# Patient Record
Sex: FEMALE | Race: WHITE | NOT HISPANIC OR LATINO | Employment: UNEMPLOYED | URBAN - METROPOLITAN AREA
[De-identification: names, ages, dates, MRNs, and addresses within clinical notes are randomized per-mention and may not be internally consistent; named-entity substitution may affect disease eponyms.]

---

## 2017-01-16 ENCOUNTER — OFFICE VISIT (OUTPATIENT)
Dept: PSYCHIATRY | Facility: CLINIC | Age: 57
End: 2017-01-16
Payer: COMMERCIAL

## 2017-01-16 DIAGNOSIS — Z63.9 FAMILY PROBLEMS: ICD-10-CM

## 2017-01-16 DIAGNOSIS — F31.32 BIPOLAR AFFECTIVE DISORDER, CURRENTLY DEPRESSED, MODERATE: Primary | ICD-10-CM

## 2017-01-16 DIAGNOSIS — Z65.8 INTERPERSONAL PROBLEM: ICD-10-CM

## 2017-01-16 DIAGNOSIS — F41.1 GAD (GENERALIZED ANXIETY DISORDER): ICD-10-CM

## 2017-01-16 DIAGNOSIS — F32.A DEPRESSION, UNSPECIFIED DEPRESSION TYPE: ICD-10-CM

## 2017-01-16 PROCEDURE — 90834 PSYTX W PT 45 MINUTES: CPT | Mod: S$GLB,,, | Performed by: SOCIAL WORKER

## 2017-01-16 SDOH — SOCIAL DETERMINANTS OF HEALTH (SDOH): PROBLEM RELATED TO PRIMARY SUPPORT GROUP, UNSPECIFIED: Z63.9

## 2017-01-16 NOTE — PROGRESS NOTES
ENCOUNTER DATE 1/15/17    SITE: [facility]    INSIGHT ORIENTED PSYCHOTHERAPY: 39166 45-50 min    Met with patient .    ENCOUNTER REASON/CHIEF COMPLAINT(symptom, problem, diagnosis, follow-up, reason for encounter):   The patient presents for follow up for coping skills to better manage her anxiety, depression and stress. pt. struggles with self regulation of her emotional states.     INTERVAL EVENTS: Pt. was last seen 1 month ago for session. Pt presents with her  for session. Pt appears less-anxious and upset. Pt continues to report she has not lost any more weight and attributes this to her medication adjustment is now only taking her risperdal, lamictal, & valium.  Pt denies any SI however continues to ruminate over events of the past in a negative manner and continues to express excessive guilt and regrets about not being more helpful with her mother's care or not being a good mother to her children. Pt reports to feel envious of others who has good relationships with their children and also feels great disappointment and embarrassed that her kids did not pursue lives and make the choices she thought they would have made. Pt reports that overall her kelsi was enjoyable both with her family as well as the dinner she hosted on Lamoni day.   Pt has not resumed working on her [employer]. Pt reports she did attend her [employer] party for her [employer] group. Pt also is planning to have her children over for lunch on Kelsi day.  Despite having this success and even helping out and taking her mother for an extra long weekend pt continues to overly dwell on the negatives and is not able to focus on the positives in her life. Pt's  expressed great frustration with his wife's outlook and her behavior surrounding this issue. Pt continues to struggle with negative thoughts and guilt from the past.  Clinician redirected pt on focusing on the present and how she can be of help to her siblings with her  mother explored alternatives ways to help. Clinician continues to strongly encourage pt to resume one of her hobbies such as pottery.   Pt continues to describe having great worry over her husbands health particularly with his eyesight. Clinician continues to work with pt on utilizing cbt skills to better manage and tolerate distress and improve interpersonal relationship skills. Pt's effort has been minimal. Pt worked on setting new goals for next meeting. Pt reports to continue medication regimen as prescribed.  Clinician continues to work with pt on self acceptance, being more realistic with her self and others and working on identifying her excessive guilt especially as it relates to her family e.g. Relationship with children.  Clinician challenged pt to work on having more structure in her daily scheduled and to work on being mindful of how much negative thinking she is dwelling on rather engaging in positive activities & focus on the present. Pt continues to report she has been compliant with medications since her last session. Pt presents more casually dressed and groomed than usual-Mood:steady better regulated-less depressed/anxious,  Affect:appropriate to circumstance, TP: circumstantial,tangential , No rapid & pressured speech,  racing thoughts, paranoid thinking, agitation, mood swings, tearful episodes (+) worry,(+) obsessive & intrusive thoughts,Sleep: improved, No SI,insight/judgement: fair/improving.  Pt continues to present with automatic negative thoughts about her self continues to have insecurities, ego dystonic and extreme guilt over her lack of involvement with her mother.  Clinician continues to challenge these negative thoughts, focus on challenge statements, worked on mindfulness become of aware of her distortions that contribute to her feelings anxious and depressed. Pt continues to discuss stress mgmt coping skills, family dynamics. Pt continues to struggle with interpersonal relationships  and family dynamics.      TREATMENT PLAN:     No changes in treatment plan from chart note of (date): 1/26/2012    Target Symptoms: Mood swings,depression, anxiety, irritability,anergiam anhedonia, affective lability, poor interpersonal relationships, co dependent bx's, guilt and racing thoughts, no motivation, frequent tearful episodes, cognitive distortions, paranoid & hopeless.    Therapeutic Intervention type:   Support  Behavior Modification  Medication Mgmt  Why chosen therapy is appropriate versus another modality:  Relevant to diagnosis:evidenced based.  Patient responds to this modality  Outcome monitoring methods:  Self-Report  Observation    RISK PARAMETERS:     Patient reports no suicidal ideation.  Patient reports no homicidal ideation.  Patient reports no self injurious behavior.  Patient reports no violent behavior.    PATIENT'S RESPONSE TO INTERVENTION:   The patient's response to intervention is accepting.    PROGRESS TOWARD GOALS AND OTHER MENTAL STATUS CHANGES:   The patient's progress toward goals is poor.    DIAGNOSIS  Bipolar DO, most recent manic  (296.52)  HUMPHREY (300.02)  Personality NOS (301.89)  Family Circumstance NOS/Caring for an aging parent with dementia (V61.3)    GLOBAL ASSESSMENT OF FUNCTION SCALE:  The patient's GAF is 70    PLAN:   Pt. should start to attend individual therapy once a week.   Medication Management: Pt is currently being followed by Dr. Mack.   See physician notes.  Return to clinic in 2-4 weeks.

## 2017-02-03 ENCOUNTER — TELEPHONE (OUTPATIENT)
Dept: FAMILY MEDICINE | Facility: CLINIC | Age: 57
End: 2017-02-03

## 2017-02-03 DIAGNOSIS — Z00.00 ROUTINE GENERAL MEDICAL EXAMINATION AT A HEALTH CARE FACILITY: Primary | ICD-10-CM

## 2017-02-03 NOTE — TELEPHONE ENCOUNTER
----- Message from Kristen Fuentes sent at 2/3/2017 11:40 AM CST -----  Contact: Self  Doctor appointment and lab have been scheduled.  Please link lab orders to the lab appointment.  Date of doctor appointment:  6/21  Physical or EP:  Physcial  Date of lab appointment:  6/15  Comments:

## 2017-02-14 ENCOUNTER — OFFICE VISIT (OUTPATIENT)
Dept: PSYCHIATRY | Facility: CLINIC | Age: 57
End: 2017-02-14
Payer: COMMERCIAL

## 2017-02-14 DIAGNOSIS — F32.A DEPRESSION, UNSPECIFIED DEPRESSION TYPE: ICD-10-CM

## 2017-02-14 DIAGNOSIS — F31.32 BIPOLAR AFFECTIVE DISORDER, CURRENTLY DEPRESSED, MODERATE: Primary | ICD-10-CM

## 2017-02-14 DIAGNOSIS — F41.1 GAD (GENERALIZED ANXIETY DISORDER): ICD-10-CM

## 2017-02-14 PROCEDURE — 90834 PSYTX W PT 45 MINUTES: CPT | Mod: S$GLB,,, | Performed by: SOCIAL WORKER

## 2017-02-14 NOTE — PROGRESS NOTES
ENCOUNTER DATE 2/14/17    SITE: [facility]    INSIGHT ORIENTED PSYCHOTHERAPY: 16401 45-50 min    Met with patient .    ENCOUNTER REASON/CHIEF COMPLAINT(symptom, problem, diagnosis, follow-up, reason for encounter):   The patient presents for follow up for coping skills to better manage her anxiety, depression and stress. pt. struggles with self regulation of her emotional states.     INTERVAL EVENTS: Pt. was last seen 1 month ago for session. Pt presents alone for session. Pt appears less-anxious and upset. Pt continues to report she has not lost any more weight and attributes this to her medication adjustment is now only taking her seroquel, risperdal, lamictal, & valium.  Pt denies any SI however continues to worry over future events regarding the status of their health particularly her 's eye sight as there has been further detertoriation of his eye sight-eventual blindness has been the prognosis and pt worries she will develop alzheimer's like her mother. Pt worries as to how they will be cared for in the future. Clinician worked with pt to reframe concerns in practical ways that can be better addressed to alleviate her concerns.  Pt is very dependent on her  to manage most of their affairs etc. It has only been since pt's  can no longer drive that pt has had to take more of a leadership role at least with driving them to where they need to go. Pt's  still manages their financial affairs and pays bill mostly all on-line.  Clinician continues to work with pt on utilizing cbt skills to better manage and tolerate distress and improve interpersonal relationship skills. Pt's effort has been minimal. Pt worked on setting new goals for next meeting. Pt reports to continue medication regimen as prescribed.  Clinician continues to work with pt on self acceptance, being more realistic with her self and others and working on identifying her excessive guilt especially as it relates to her  family e.g. Relationship with children.  Clinician challenged pt to work on having more structure in her daily scheduled and to work on being mindful of how much negative thinking she is dwelling on rather engaging in positive activities & focus on the present. Pt continues to report she has been compliant with medications since her last session. Pt presents more casually dressed and groomed than usual-Mood:steady better regulated-less depressed/anxious,  Affect:appropriate to circumstance, TP: circumstantial,tangential , No rapid & pressured speech,  racing thoughts, paranoid thinking, agitation, mood swings, tearful episodes (+) worry,(+) obsessive & intrusive thoughts,Sleep: improved, No SI,insight/judgement: fair/improving.  Pt continues to present with automatic negative thoughts about her self continues to have insecurities, ego dystonic and extreme guilt over her lack of involvement with her mother.  Clinician continues to challenge these negative thoughts, focus on challenge statements, worked on mindfulness become of aware of her distortions that contribute to her feelings anxious and depressed. Pt continues to discuss stress mgmt coping skills, family dynamics. Pt continues to struggle with interpersonal relationships and family dynamics.      TREATMENT PLAN:     No changes in treatment plan from chart note of (date): 1/26/2012    Target Symptoms: Mood swings,depression, anxiety, irritability,anergiam anhedonia, affective lability, poor interpersonal relationships, co dependent bx's, guilt and racing thoughts, no motivation, frequent tearful episodes, cognitive distortions, paranoid & hopeless.    Therapeutic Intervention type:   Support  Behavior Modification  Medication Mgmt  Why chosen therapy is appropriate versus another modality:  Relevant to diagnosis:evidenced based.  Patient responds to this modality  Outcome monitoring methods:  Self-Report  Observation    RISK PARAMETERS:     Patient reports no  suicidal ideation.  Patient reports no homicidal ideation.  Patient reports no self injurious behavior.  Patient reports no violent behavior.    PATIENT'S RESPONSE TO INTERVENTION:   The patient's response to intervention is accepting.    PROGRESS TOWARD GOALS AND OTHER MENTAL STATUS CHANGES:   The patient's progress toward goals is poor.    DIAGNOSIS  Bipolar DO, most recent manic  (296.52)  HUMPHREY (300.02)  Personality NOS (301.89)  Family Circumstance NOS/Caring for an aging parent with dementia (V61.3)    GLOBAL ASSESSMENT OF FUNCTION SCALE:  The patient's GAF is 70    PLAN:   Pt. should start to attend individual therapy once a week.   Medication Management: Pt is currently being followed by Dr. Mack.   See physician notes.  Return to clinic in 2-4 weeks.

## 2017-03-13 ENCOUNTER — OFFICE VISIT (OUTPATIENT)
Dept: OBSTETRICS AND GYNECOLOGY | Facility: CLINIC | Age: 57
End: 2017-03-13
Attending: OBSTETRICS & GYNECOLOGY
Payer: COMMERCIAL

## 2017-03-13 ENCOUNTER — OFFICE VISIT (OUTPATIENT)
Dept: PSYCHIATRY | Facility: CLINIC | Age: 57
End: 2017-03-13
Payer: COMMERCIAL

## 2017-03-13 VITALS
BODY MASS INDEX: 19.54 KG/M2 | WEIGHT: 110.25 LBS | SYSTOLIC BLOOD PRESSURE: 104 MMHG | DIASTOLIC BLOOD PRESSURE: 70 MMHG | HEIGHT: 63 IN

## 2017-03-13 DIAGNOSIS — B02.9 HERPES ZOSTER WITHOUT COMPLICATION: Primary | ICD-10-CM

## 2017-03-13 DIAGNOSIS — F32.A DEPRESSION, UNSPECIFIED DEPRESSION TYPE: Primary | ICD-10-CM

## 2017-03-13 DIAGNOSIS — F31.0 BIPOLAR AFFECTIVE DISORDER, CURRENT EPISODE HYPOMANIC: ICD-10-CM

## 2017-03-13 DIAGNOSIS — Z63.9 FAMILY PROBLEMS: ICD-10-CM

## 2017-03-13 DIAGNOSIS — F41.1 GAD (GENERALIZED ANXIETY DISORDER): ICD-10-CM

## 2017-03-13 PROCEDURE — 99203 OFFICE O/P NEW LOW 30 MIN: CPT | Mod: S$GLB,,, | Performed by: OBSTETRICS & GYNECOLOGY

## 2017-03-13 PROCEDURE — 99999 PR PBB SHADOW E&M-EST. PATIENT-LVL II: CPT | Mod: PBBFAC,,, | Performed by: OBSTETRICS & GYNECOLOGY

## 2017-03-13 PROCEDURE — 1160F RVW MEDS BY RX/DR IN RCRD: CPT | Mod: S$GLB,,, | Performed by: OBSTETRICS & GYNECOLOGY

## 2017-03-13 PROCEDURE — 90834 PSYTX W PT 45 MINUTES: CPT | Mod: S$GLB,,, | Performed by: SOCIAL WORKER

## 2017-03-13 RX ORDER — DIAZEPAM 5 MG/1
5 TABLET ORAL EVERY 6 HOURS PRN
COMMUNITY
Start: 2017-02-13 | End: 2022-03-03 | Stop reason: DRUGHIGH

## 2017-03-13 RX ORDER — LAMOTRIGINE 200 MG/1
TABLET ORAL 2 TIMES DAILY
COMMUNITY
Start: 2017-03-09

## 2017-03-13 RX ORDER — ACYCLOVIR 800 MG/1
800 TABLET ORAL
Qty: 35 TABLET | Refills: 0 | Status: SHIPPED | OUTPATIENT
Start: 2017-03-13 | End: 2017-04-07 | Stop reason: SDUPTHER

## 2017-03-13 RX ORDER — QUETIAPINE 300 MG/1
TABLET, FILM COATED, EXTENDED RELEASE ORAL DAILY
COMMUNITY
End: 2017-06-21 | Stop reason: DRUGHIGH

## 2017-03-13 SDOH — SOCIAL DETERMINANTS OF HEALTH (SDOH): PROBLEM RELATED TO PRIMARY SUPPORT GROUP, UNSPECIFIED: Z63.9

## 2017-03-13 NOTE — PROGRESS NOTES
ENCOUNTER DATE 3/13/17    SITE: [facility]    INSIGHT ORIENTED PSYCHOTHERAPY: 29683 45-50 min    Met with patient .    ENCOUNTER REASON/CHIEF COMPLAINT(symptom, problem, diagnosis, follow-up, reason for encounter):   The patient presents for follow up for coping skills to better manage her anxiety, depression and stress. pt. struggles with self regulation of her emotional states.     INTERVAL EVENTS: Pt. was last seen 1 month ago for session. Pt presents alone for session. Pt appears more-anxious and upset. Pt continues to report she has not lost any more weight and attributes this to her medication adjustment is now only taking her seroquel, risperdal, lamictal, & valium.  Pt denies any SI however continues to worry over future events regarding the status of their health particularly her 's eye sight as there has been further detertoriation of his eye sight-eventual blindness has been the prognosis and pt worries she will develop alzheimer's like her mother. Pt worries as to how they will be cared for in the future. Clinician continues to work with pt to reframe concerns in practical ways that can be better addressed to alleviate her concerns.  Pt is very dependent on her  to manage most of their affairs etc. It has only been since pt's  can no longer drive that pt has had to take more of a leadership role at least with driving them to where they need to go. Pt's  still manages their financial affairs and pays bill mostly all on-line.  Pt reports she is feeling more overwhelmed bc her  wants her to learn how to manage checking and bills on-line and pt reports to feel very uncomfortable with using a computer.  Clinician continues to work with pt on utilizing cbt skills to better manage and tolerate distress and improve interpersonal relationship skills. Pt's effort has been minimal. Pt worked on setting new goals for next meeting. Pt reports to continue medication regimen as  prescribed.  Clinician continues to work with pt on self acceptance, being more realistic with her self and others and working on identifying her excessive guilt especially as it relates to her family e.g. Relationship with children.  Clinician continues to challenge pt to work on having more structure in her daily scheduled and to work on being mindful of how much negative thinking she is dwelling on rather engaging in positive activities & focus on the present. Pt continues to report she has been compliant with medications since her last session. Pt presents more casually dressed and groomed than usual-Mood:steady better regulated-less depressed/anxious,  Affect:appropriate to circumstance, TP: circumstantial,tangential , No rapid & pressured speech,  racing thoughts, paranoid thinking, agitation, mood swings, tearful episodes (+) worry,(+) obsessive & intrusive thoughts,Sleep: improved, No SI,insight/judgement: fair/improving.  Pt continues to present with automatic negative thoughts about her self continues to have insecurities, ego dystonic and extreme guilt over her lack of involvement with her mother.  Clinician continues to challenge these negative thoughts, focus on challenge statements, worked on mindfulness become of aware of her distortions that contribute to her feelings anxious and depressed. Pt continues to discuss stress mgmt coping skills, family dynamics. Pt continues to struggle with interpersonal relationships and family dynamics.      TREATMENT PLAN:     No changes in treatment plan from chart note of (date): 1/26/2012    Target Symptoms: Mood swings,depression, anxiety, irritability,anergiam anhedonia, affective lability, poor interpersonal relationships, co dependent bx's, guilt and racing thoughts, no motivation, frequent tearful episodes, cognitive distortions, paranoid & hopeless.    Therapeutic Intervention type:   Support  Behavior Modification  Medication Mgmt  Why chosen therapy is  appropriate versus another modality:  Relevant to diagnosis:evidenced based.  Patient responds to this modality  Outcome monitoring methods:  Self-Report  Observation    RISK PARAMETERS:     Patient reports no suicidal ideation.  Patient reports no homicidal ideation.  Patient reports no self injurious behavior.  Patient reports no violent behavior.    PATIENT'S RESPONSE TO INTERVENTION:   The patient's response to intervention is accepting.    PROGRESS TOWARD GOALS AND OTHER MENTAL STATUS CHANGES:   The patient's progress toward goals is poor.    DIAGNOSIS  Bipolar DO, most recent manic  (296.52)  HUMPHREY (300.02)  Personality NOS (301.89)  Family Circumstance NOS/Caring for an aging parent with dementia (V61.3)    GLOBAL ASSESSMENT OF FUNCTION SCALE:  The patient's GAF is 70    PLAN:   Pt. should start to attend individual therapy once a week.   Medication Management: Pt is currently being followed by Dr. Mack.   See physician notes.  Return to clinic in 2-4 weeks.

## 2017-03-14 NOTE — PROGRESS NOTES
Subjective:       Patient ID: Kimberly Jo is a 57 y.o. female.    Chief Complaint:  Lesion (Patient reports sx staretd 1 week ago.)      History of Present Illness  HPI  Pt is 57 y.o. ,  x 5 years, who first noticed a rash in her vulvar area approx 5 days ago.  Started out like a black head and her partner tried to pop it.  It has been getting worse with associated nerve pain/ itching.  No hx of herpes (with partner or patient).  Is tender.  Nervous about what this could be.    GYN & OB History  No LMP recorded. Patient is postmenopausal.   Date of Last Pap: 2015    OB History    Para Term  AB SAB TAB Ectopic Multiple Living   3 2  0 1 1 0 0 0 2      # Outcome Date GA Lbr Darrell/2nd Weight Sex Delivery Anes PTL Lv   3 SAB            2 Para      Vag-Spont      1 Para      Vag-Spont             Review of Systems  Review of Systems   Constitutional: Negative for activity change, appetite change and fatigue.   HENT: Negative.  Negative for tinnitus.    Eyes: Negative.    Respiratory: Negative for cough and shortness of breath.    Cardiovascular: Negative for chest pain and palpitations.   Gastrointestinal: Negative.  Negative for abdominal pain, blood in stool, constipation, diarrhea and nausea.   Endocrine: Negative.  Negative for hot flashes.   Genitourinary: Positive for genital sores. Negative for dyspareunia, dysuria, frequency, menstrual problem, pelvic pain, vaginal discharge, dysmenorrhea, urinary incontinence and postcoital bleeding.   Musculoskeletal: Negative for back pain and joint swelling.   Skin:  Negative for rash.   Neurological: Negative.  Negative for headaches.   Hematological: Negative.  Does not bruise/bleed easily.   Psychiatric/Behavioral: The patient is not nervous/anxious.    Breast: negative.  Negative for breast mass, nipple discharge and skin changes          Objective:    Physical Exam:   Constitutional: She is oriented to person, place, and time. She appears  well-developed and well-nourished. No distress.    HENT:   Head: Normocephalic and atraumatic.    Eyes: Conjunctivae and lids are normal. Pupils are equal, round, and reactive to light.    Neck: Normal range of motion and full passive range of motion without pain. Neck supple.    Cardiovascular: Normal rate and regular rhythm.  Exam reveals no edema.     Pulmonary/Chest: Effort normal and breath sounds normal. She has no wheezes.        Abdominal: Soft. Normal appearance and bowel sounds are normal. She exhibits no distension. There is no tenderness. There is no rebound and no guarding.     Genitourinary: Vagina normal and uterus normal.       Pelvic exam was performed with patient supine. There is no tenderness or lesion on the right labia. There is no tenderness or lesion on the left labia. Uterus is not deviated, not fixed and not hosting fibroids. Cervix is normal. Right adnexum displays no mass and no tenderness. Left adnexum displays no mass and no tenderness. No rectocele, cystocele or unspecified prolapse of vaginal walls in the vagina. No vaginal discharge found. Labial bartholins normal.Cervix exhibits no motion tenderness and no discharge.   Genitourinary Comments: Exam consistent with shingles.           Musculoskeletal: Normal range of motion and moves all extremeties.      Lymphadenopathy:        Right: Inguinal adenopathy present.        Left: No inguinal adenopathy present.    Neurological: She is alert and oriented to person, place, and time. She has normal strength.    Skin: Skin is warm and dry.    Psychiatric: She has a normal mood and affect. Her speech is normal and behavior is normal. Thought content normal. Her mood appears not anxious. She does not exhibit a depressed mood. She expresses no suicidal plans and no homicidal plans.          Assessment:        1. Herpes zoster without complication                Plan:      Kimberly was seen today for lesion.    Diagnoses and all orders for this  visit:    Herpes zoster without complication  -     acyclovir (ZOVIRAX) 800 MG Tab; Take 1 tablet (800 mg total) by mouth 5 (five) times daily.  Discussed etiology of shingles and different tx options.  Since outbreak was > 72 hours ago, acyclovir may not be as quick to work.  But will start it  Offered topical lido and pt declined

## 2017-03-16 ENCOUNTER — TELEPHONE (OUTPATIENT)
Dept: OBSTETRICS AND GYNECOLOGY | Facility: CLINIC | Age: 57
End: 2017-03-16

## 2017-03-16 NOTE — TELEPHONE ENCOUNTER
Patient was informed that she does not need an additional refill for the Zovirax,patient was instructed to continue taking the medication as directed and complete the whole course,verbilazed understanding.

## 2017-03-16 NOTE — TELEPHONE ENCOUNTER
----- Message from Marjorie Roman sent at 3/16/2017  3:01 PM CDT -----  Contact: Self  Pt is calling in regards of her medication for her rash. The pt states that she wasn't taking her medication as directed and started getting blister like bumps. Pt also states that now she is currently taking her medication as directed and has already used 12 pills and is wondering if she is needing another refill to compensate for the pills she has already taken. The pt can be reached at [phone]. Thanks KG

## 2017-04-07 DIAGNOSIS — B02.9 HERPES ZOSTER WITHOUT COMPLICATION: ICD-10-CM

## 2017-04-07 NOTE — TELEPHONE ENCOUNTER
----- Message from Marcella Travis sent at 4/7/2017 10:26 AM CDT -----  Contact: self [phone]  Pt needing a call back regarding a Rx, she can be reached at [phone] or [phone].

## 2017-04-07 NOTE — TELEPHONE ENCOUNTER
Patient states that she is having another shingle outbreak in the vaginal area and would like a refill for Zovirax,patient is currently under a lot of stress and believes that is trigger her sx,patient was informed that I will send request to Dr. Pulido for approval,verbilazed understanding.

## 2017-04-07 NOTE — TELEPHONE ENCOUNTER
----- Message from Anne Ramon sent at 4/7/2017  4:17 PM CDT -----  Contact: pt  _x  1st Request  _  2nd Request  _  3rd Request      Who:pt    Why: pt calling in regards to an antibiotic     What Number to Call Back: [phone]    When to Expect a call back: (Before the end of the day)   -- if call after 3:00 call back will be tomorrow.

## 2017-04-08 RX ORDER — ACYCLOVIR 800 MG/1
800 TABLET ORAL
Qty: 35 TABLET | Refills: 0 | Status: SHIPPED | OUTPATIENT
Start: 2017-04-08 | End: 2017-06-21 | Stop reason: ALTCHOICE

## 2017-04-10 ENCOUNTER — TELEPHONE (OUTPATIENT)
Dept: FAMILY MEDICINE | Facility: CLINIC | Age: 57
End: 2017-04-10

## 2017-04-10 ENCOUNTER — NURSE TRIAGE (OUTPATIENT)
Dept: ADMINISTRATIVE | Facility: CLINIC | Age: 57
End: 2017-04-10

## 2017-04-10 NOTE — TELEPHONE ENCOUNTER
Reason for Disposition   Sharp severe pain(s) and lasting for seconds to minutes and now gone    Protocols used: [facility] PAIN-[facility]  Patient whole bottom jaw pain that occured in the middle of the night. Patient states pain last a few minutes.       Spoke with pt.  Denies any chest pain, shortness of breath, shoulder or arm pain.  Pt will keep 6/21 appt annual exam and 6/15 lab.  Pt will call for earlier appt to address jaw pain should it return.

## 2017-04-10 NOTE — TELEPHONE ENCOUNTER
Reason for Disposition   Sharp severe pain(s) and lasting for seconds to minutes and now gone    Protocols used: [facility] PAIN-A-[facility]  Patient whole bottom jaw pain that occured in the middle of the night. Patient states pain last a few minutes.

## 2017-05-15 ENCOUNTER — OFFICE VISIT (OUTPATIENT)
Dept: PSYCHIATRY | Facility: CLINIC | Age: 57
End: 2017-05-15
Payer: COMMERCIAL

## 2017-05-15 DIAGNOSIS — F31.32 BIPOLAR AFFECTIVE DISORDER, CURRENTLY DEPRESSED, MODERATE: Primary | ICD-10-CM

## 2017-05-15 DIAGNOSIS — Z63.9 FAMILY PROBLEMS: ICD-10-CM

## 2017-05-15 DIAGNOSIS — F32.A DEPRESSION, UNSPECIFIED DEPRESSION TYPE: ICD-10-CM

## 2017-05-15 DIAGNOSIS — Z65.8 INTERPERSONAL PROBLEM: ICD-10-CM

## 2017-05-15 DIAGNOSIS — F41.1 GAD (GENERALIZED ANXIETY DISORDER): ICD-10-CM

## 2017-05-15 PROCEDURE — 90834 PSYTX W PT 45 MINUTES: CPT | Mod: S$GLB,,, | Performed by: SOCIAL WORKER

## 2017-05-15 SDOH — SOCIAL DETERMINANTS OF HEALTH (SDOH): PROBLEM RELATED TO PRIMARY SUPPORT GROUP, UNSPECIFIED: Z63.9

## 2017-05-15 NOTE — PROGRESS NOTES
ENCOUNTER DATE 5/15/17    SITE: [facility]    INSIGHT ORIENTED PSYCHOTHERAPY: 58277 45-50 min    Met with patient .    ENCOUNTER REASON/CHIEF COMPLAINT(symptom, problem, diagnosis, follow-up, reason for encounter):   The patient presents for follow up for coping skills to better manage her anxiety, depression and stress. pt. struggles with self regulation of her emotional states.     INTERVAL EVENTS: Pt was last seen 2 months ago for session. Pt presents alone for session. Pt continues to complain of depression and anxiety symptoms.  Pt reports yesterday was a difficult day as it was mother's day and she has a difficult relationship with her adult children particularly her daughter. Pt continues to over think past events that may have contributed to her difficulties she has with her daughter. Clinician continues to challenge pt to focus on her & now issues work on present day activities to become more engaged in her own life. Pt denies any si. pt continues to worry over future events regarding the status of their health particularly her 's eye sight as there has been further detertoriation of his eye sight-eventual blindness has been the prognosis and pt worries she will develop alzheimer's like her mother. Pt worries as to how they will be cared for in the future. Clinician continues to work with pt to reframe concerns in practical ways that can be better addressed to alleviate her concerns.  Pt continues to be very dependent on her  to manage most of their affairs etc. It has only been since pt's  can no longer drive that pt has had to take more of a leadership role at least with driving them to where they need to go. Pt's  still manages their financial affairs and pays bill mostly all on-line.  Pt continues to report she is feeling more overwhelmed bc her  wants her to learn how to manage checking and bills on-line and pt reports to feel very uncomfortable with using a  computer.  Clinician continues to work with pt on utilizing cbt skills to better manage and tolerate distress and improve interpersonal relationship skills. Pt's effort has been minimal. Pt worked on setting new goals for next meeting. Pt reports to continue medication regimen as prescribed.  Clinician continues to work with pt on self acceptance, being more realistic with her self and others and working on identifying her excessive guilt especially as it relates to her family e.g. Relationship with children.  Clinician continues to challenge pt to work on having more structure in her daily scheduled and to work on being mindful of how much negative thinking she is dwelling on rather engaging in positive activities & focus on the present. Pt continues to report she has been compliant with medications since her last session. Pt presents more casually dressed and groomed than usual-Mood: more-depressed/anxious,  Affect:appropriate to circumstance, TP: circumstantial,tangential , No rapid & pressured speech,  racing thoughts, paranoid thinking, agitation, mood swings, tearful episodes (+) worry,(+) obsessive & intrusive thoughts,Sleep: improved, No SI,insight/judgement: fair/improving.  Pt continues to present with automatic negative thoughts about her self continues to have insecurities, ego dystonic and extreme guilt over her lack of involvement with her mother.  Clinician continues to challenge these negative thoughts, focus on challenge statements, worked on mindfulness become of aware of her distortions that contribute to her feelings anxious and depressed. Pt continues to discuss stress mgmt coping skills, family dynamics. Pt continues to struggle with interpersonal relationships and family dynamics.      TREATMENT PLAN:     No changes in treatment plan from chart note of (date): 1/26/2012    Target Symptoms: Mood swings,depression, anxiety, irritability,anergiam anhedonia, affective lability, poor interpersonal  relationships, co dependent bx's, guilt and racing thoughts, no motivation, frequent tearful episodes, cognitive distortions, paranoid & hopeless.    Therapeutic Intervention type:   Support  Behavior Modification  Medication Mgmt  Why chosen therapy is appropriate versus another modality:  Relevant to diagnosis:evidenced based.  Patient responds to this modality  Outcome monitoring methods:  Self-Report  Observation    RISK PARAMETERS:     Patient reports no suicidal ideation.  Patient reports no homicidal ideation.  Patient reports no self injurious behavior.  Patient reports no violent behavior.    PATIENT'S RESPONSE TO INTERVENTION:   The patient's response to intervention is accepting.    PROGRESS TOWARD GOALS AND OTHER MENTAL STATUS CHANGES:   The patient's progress toward goals is poor.    DIAGNOSIS  Bipolar DO, most recent manic  (296.52)  HUMPHREY (300.02)  Personality NOS (301.89)  Family Circumstance NOS/Caring for an aging parent with dementia (V61.3)    GLOBAL ASSESSMENT OF FUNCTION SCALE:  The patient's GAF is 70    PLAN:   Pt. should start to attend individual therapy once a week.   Medication Management: Pt is currently being followed by Dr. Mack.   See physician notes.  Return to clinic in 2-4 weeks.

## 2017-06-09 ENCOUNTER — TELEPHONE (OUTPATIENT)
Dept: FAMILY MEDICINE | Facility: CLINIC | Age: 57
End: 2017-06-09

## 2017-06-09 DIAGNOSIS — Z00.00 ROUTINE GENERAL MEDICAL EXAMINATION AT A HEALTH CARE FACILITY: Primary | ICD-10-CM

## 2017-06-09 NOTE — TELEPHONE ENCOUNTER
Voice mail msg/pt Hep C was checked last year (negative).  No need to repeat.  Order entered and linked for A1C.   regular

## 2017-06-09 NOTE — TELEPHONE ENCOUNTER
----- Message from Genoveva Elliott sent at 6/9/2017 10:19 AM CDT -----  Contact: [phone]  Pt is scheduled for 06/15/ for labs for her annual physical on 06/21/17, she is requesting to have a hep c and A1C added to her orders to have done

## 2017-06-14 ENCOUNTER — OFFICE VISIT (OUTPATIENT)
Dept: PSYCHIATRY | Facility: CLINIC | Age: 57
End: 2017-06-14
Payer: COMMERCIAL

## 2017-06-14 DIAGNOSIS — F31.31 BIPOLAR AFFECTIVE DISORDER, CURRENTLY DEPRESSED, MILD: Primary | ICD-10-CM

## 2017-06-14 DIAGNOSIS — F32.A DEPRESSION, UNSPECIFIED DEPRESSION TYPE: ICD-10-CM

## 2017-06-14 DIAGNOSIS — F41.1 GAD (GENERALIZED ANXIETY DISORDER): ICD-10-CM

## 2017-06-14 DIAGNOSIS — Z63.9 FAMILY PROBLEMS: ICD-10-CM

## 2017-06-14 DIAGNOSIS — Z65.8 INTERPERSONAL PROBLEM: ICD-10-CM

## 2017-06-14 PROCEDURE — 90834 PSYTX W PT 45 MINUTES: CPT | Mod: S$GLB,,, | Performed by: SOCIAL WORKER

## 2017-06-14 SDOH — SOCIAL DETERMINANTS OF HEALTH (SDOH): PROBLEM RELATED TO PRIMARY SUPPORT GROUP, UNSPECIFIED: Z63.9

## 2017-06-15 ENCOUNTER — LAB VISIT (OUTPATIENT)
Dept: LAB | Facility: HOSPITAL | Age: 57
End: 2017-06-15
Attending: FAMILY MEDICINE
Payer: COMMERCIAL

## 2017-06-15 DIAGNOSIS — Z00.00 ROUTINE GENERAL MEDICAL EXAMINATION AT A HEALTH CARE FACILITY: ICD-10-CM

## 2017-06-15 LAB
ALBUMIN SERPL BCP-MCNC: 4.2 G/DL
ALP SERPL-CCNC: 81 U/L
ALT SERPL W/O P-5'-P-CCNC: 65 U/L
ANION GAP SERPL CALC-SCNC: 10 MMOL/L
AST SERPL-CCNC: 40 U/L
BASOPHILS # BLD AUTO: 0.01 K/UL
BASOPHILS NFR BLD: 0.1 %
BILIRUB SERPL-MCNC: 0.6 MG/DL
BUN SERPL-MCNC: 8 MG/DL
CALCIUM SERPL-MCNC: 9.7 MG/DL
CHLORIDE SERPL-SCNC: 105 MMOL/L
CHOLEST/HDLC SERPL: 3.6 {RATIO}
CO2 SERPL-SCNC: 24 MMOL/L
CREAT SERPL-MCNC: 0.9 MG/DL
DIFFERENTIAL METHOD: ABNORMAL
EOSINOPHIL # BLD AUTO: 0 K/UL
EOSINOPHIL NFR BLD: 0.1 %
ERYTHROCYTE [DISTWIDTH] IN BLOOD BY AUTOMATED COUNT: 12.9 %
EST. GFR  (AFRICAN AMERICAN): >60 ML/MIN/1.73 M^2
EST. GFR  (NON AFRICAN AMERICAN): >60 ML/MIN/1.73 M^2
GLUCOSE SERPL-MCNC: 89 MG/DL
HCT VFR BLD AUTO: 42.9 %
HDL/CHOLESTEROL RATIO: 27.6 %
HDLC SERPL-MCNC: 261 MG/DL
HDLC SERPL-MCNC: 72 MG/DL
HGB BLD-MCNC: 14.2 G/DL
LDLC SERPL CALC-MCNC: 169.8 MG/DL
LYMPHOCYTES # BLD AUTO: 2.6 K/UL
LYMPHOCYTES NFR BLD: 38 %
MCH RBC QN AUTO: 32.9 PG
MCHC RBC AUTO-ENTMCNC: 33.1 %
MCV RBC AUTO: 99 FL
MONOCYTES # BLD AUTO: 0.5 K/UL
MONOCYTES NFR BLD: 7.1 %
NEUTROPHILS # BLD AUTO: 3.8 K/UL
NEUTROPHILS NFR BLD: 54.6 %
NONHDLC SERPL-MCNC: 189 MG/DL
PLATELET # BLD AUTO: 246 K/UL
PMV BLD AUTO: 10.5 FL
POTASSIUM SERPL-SCNC: 3.8 MMOL/L
PROT SERPL-MCNC: 7.5 G/DL
RBC # BLD AUTO: 4.32 M/UL
SODIUM SERPL-SCNC: 139 MMOL/L
TRIGL SERPL-MCNC: 96 MG/DL
TSH SERPL DL<=0.005 MIU/L-ACNC: 0.72 UIU/ML
WBC # BLD AUTO: 6.92 K/UL

## 2017-06-15 PROCEDURE — 83036 HEMOGLOBIN GLYCOSYLATED A1C: CPT

## 2017-06-15 PROCEDURE — 85025 COMPLETE CBC W/AUTO DIFF WBC: CPT

## 2017-06-15 PROCEDURE — 80061 LIPID PANEL: CPT

## 2017-06-15 PROCEDURE — 36415 COLL VENOUS BLD VENIPUNCTURE: CPT | Mod: PO

## 2017-06-15 PROCEDURE — 80053 COMPREHEN METABOLIC PANEL: CPT

## 2017-06-15 PROCEDURE — 84443 ASSAY THYROID STIM HORMONE: CPT

## 2017-06-16 LAB
ESTIMATED AVG GLUCOSE: 108 MG/DL
HBA1C MFR BLD HPLC: 5.4 %

## 2017-06-21 ENCOUNTER — OFFICE VISIT (OUTPATIENT)
Dept: FAMILY MEDICINE | Facility: CLINIC | Age: 57
End: 2017-06-21
Payer: COMMERCIAL

## 2017-06-21 VITALS
WEIGHT: 114.88 LBS | TEMPERATURE: 98 F | DIASTOLIC BLOOD PRESSURE: 69 MMHG | HEART RATE: 92 BPM | SYSTOLIC BLOOD PRESSURE: 100 MMHG | HEIGHT: 63 IN | BODY MASS INDEX: 20.36 KG/M2

## 2017-06-21 DIAGNOSIS — Z00.00 ROUTINE GENERAL MEDICAL EXAMINATION AT A HEALTH CARE FACILITY: Primary | ICD-10-CM

## 2017-06-21 DIAGNOSIS — I47.10 SVT (SUPRAVENTRICULAR TACHYCARDIA): ICD-10-CM

## 2017-06-21 DIAGNOSIS — E78.5 HYPERLIPIDEMIA, UNSPECIFIED HYPERLIPIDEMIA TYPE: ICD-10-CM

## 2017-06-21 DIAGNOSIS — K59.09 CHRONIC CONSTIPATION: ICD-10-CM

## 2017-06-21 DIAGNOSIS — F31.32 BIPOLAR AFFECTIVE DISORDER, CURRENTLY DEPRESSED, MODERATE: ICD-10-CM

## 2017-06-21 DIAGNOSIS — F41.1 GAD (GENERALIZED ANXIETY DISORDER): ICD-10-CM

## 2017-06-21 PROBLEM — Z86.19 HISTORY OF HERPES ZOSTER: Status: ACTIVE | Noted: 2017-06-21

## 2017-06-21 PROCEDURE — 99999 PR PBB SHADOW E&M-EST. PATIENT-LVL III: CPT | Mod: PBBFAC,,, | Performed by: FAMILY MEDICINE

## 2017-06-21 PROCEDURE — 99396 PREV VISIT EST AGE 40-64: CPT | Mod: S$GLB,,, | Performed by: FAMILY MEDICINE

## 2017-06-21 RX ORDER — QUETIAPINE 400 MG/1
TABLET, FILM COATED, EXTENDED RELEASE ORAL DAILY
COMMUNITY
Start: 2017-04-26 | End: 2019-07-10 | Stop reason: DRUGHIGH

## 2017-06-21 NOTE — PROGRESS NOTES
Subjective:      Patient ID: Kimberly Jo is a 57 y.o. female.    Chief Complaint: Annual Exam     Here today for general exam.     Enjoying yoga & healthy weight.     Has chronic constipation, no blood in stool. States normal colonoscopy at 51 by [facility] physician. She stopped eating vegetables, eating more chips. Uses metamucil & dulcolax, 3 cups caffeine    She follows with psychiatrist and therapist for depression, anxiety.  She states medication is working well.  She is concerned with her  who has worsening glaucoma    No new episodes of SVT    Denies any chest pain, shortness of breath, nausea vomiting diarrhea, blood in stool, heartburn    The 10-year ASCVD risk score (Reanna COSTA Jr., et al., 2013) is: 1.9%    Values used to calculate the score:      Age: 57 years      Sex: Female      Is Non- : No      Diabetic: No      Tobacco smoker: No      Systolic Blood Pressure: 100 mmHg      Is BP treated: No      HDL Cholesterol: 72 mg/dL      Total Cholesterol: 261 mg/dL      Lab Results   Component Value Date    HGBA1C 5.4 06/15/2017     No results found for: MICROALBUR  Lab Results   Component Value Date    LDLCALC 169.8 (H) 06/15/2017    LDLCALC 134.4 06/09/2016    CHOL 261 (H) 06/15/2017    HDL 72 06/15/2017    TRIG 96 06/15/2017       Lab Results   Component Value Date     06/15/2017    K 3.8 06/15/2017     06/15/2017    CO2 24 06/15/2017    GLU 89 06/15/2017    BUN 8 06/15/2017    CREATININE 0.9 06/15/2017    CALCIUM 9.7 06/15/2017    PROT 7.5 06/15/2017    ALBUMIN 4.2 06/15/2017    BILITOT 0.6 06/15/2017    ALKPHOS 81 06/15/2017    AST 40 06/15/2017    ALT 65 (H) 06/15/2017    ANIONGAP 10 06/15/2017    ESTGFRAFRICA >60.0 06/15/2017    EGFRNONAA >60.0 06/15/2017    WBC 6.92 06/15/2017    HGB 14.2 06/15/2017    HCT 42.9 06/15/2017    MCV 99 (H) 06/15/2017     06/15/2017    TSH 0.719 06/15/2017         Current Outpatient Prescriptions on File Prior to Visit  "  Medication Sig    biotin 2,500 mcg Tab Take 50,000 mcg by mouth once daily.    cholecalciferol, vitamin D3, (VITAMIN D3) 2,000 unit Tab Take by mouth once daily.    diazePAM (VALIUM) 5 MG tablet     lamotrigine (LAMICTAL) 200 MG tablet      No current facility-administered medications on file prior to visit.      Past Medical History:   Diagnosis Date    Depression     Dr Lourdes Mack & therapist Jaziel    History of herpes zoster 06/21/2017    tx GYN    HLD (hyperlipidemia) 2/2/2016    Slow transit constipation 2/2/2016    CS 50 yo    SVT (supraventricular tachycardia)     2015 ablated with Adenosine EMT (too much caffeine), cardiologist rec bblocker if it recurrs    Vitamin D deficiency     2015 OTC suppl     No past surgical history on file.  Social History     Social History Narrative    Homemaker, enjoys yoga,  with glaucoma, 2 children, nonsmoker, ETOH socially, GYN here & [place], nml pap 6/2015, normal colonoscopy 50 yo per pt with doctor on [place]     Family History   Problem Relation Age of Onset    Melanoma Father     Cancer Father     Heart disease Brother 28     after injury hit head    Hypertension Mother     Alzheimer's disease Mother     Breast cancer Neg Hx     Colon cancer Neg Hx     Ovarian cancer Neg Hx      Vitals:    06/21/17 0955   BP: 100/69   Pulse: 92   Temp: 98.3 °F (36.8 °C)   Weight: 52.1 kg (114 lb 13.8 oz)   Height: 5' 3" (1.6 m)     Objective:   Physical Exam   Constitutional: She is oriented to person, place, and time. She appears well-developed and well-nourished.   HENT:   Head: Normocephalic and atraumatic.   Right Ear: External ear normal.   Left Ear: External ear normal.   Nose: Nose normal.   Mouth/Throat: Oropharynx is clear and moist.   Eyes: EOM are normal. Pupils are equal, round, and reactive to light.   Neck: Neck supple. No thyromegaly present.   Cardiovascular: Normal rate, regular rhythm, normal heart sounds and intact distal pulses.    No " murmur heard.  Pulmonary/Chest: Effort normal and breath sounds normal. She has no wheezes.   Abdominal: Soft. Bowel sounds are normal. She exhibits no distension and no mass. There is no tenderness. There is no rebound and no guarding.   Musculoskeletal: She exhibits no edema.   Lymphadenopathy:     She has no cervical adenopathy.   Neurological: She is alert and oriented to person, place, and time.   Skin: Skin is warm and dry.   Psychiatric: She has a normal mood and affect. Her behavior is normal.     Assessment:     1. Routine general medical examination at a health care facility    2. Hyperlipidemia, unspecified hyperlipidemia type    3. Bipolar affective disorder, currently depressed, moderate    4. HUMPHRYE (generalized anxiety disorder)    5. SVT (supraventricular tachycardia)    6. Chronic constipation      Plan:         Pap & mammo due 2018    No new episodes SVT, decrease caffeine    Continue meds    Follow [facility] psychiatrist & therapist, keep yogaing  Patient Instructions   ==============================================================      I'd like to advise you on current CANCER SCREENING recommendations:  ~~~~~~~~~~~~~~~~~~~~~~~~~~~~~~~~~~~~~~~~~~~~~~~~~~~~~~~~~~~~~   If all previous PAP SMEARS have been normal, no current problems, and no family history of female cancers, it is recommended to have Pap smear every 3 years (or after 29 yo, every 5 years with HPV co-testing).   MAMMOGRAM  every 1-2 years, from 50 - 74 years old. We can discuss your risk at 40 & determine whether to get mammogram sooner  Of course, I can perform this sooner if there is family history of cancer, or if you have problems or questions    RECOMMENDATIONS FOR FEMALES    Prevent the #1 cause of death- cardiovascular disease (HEART ATTACK & STROKE) by checking for normal blood pressure, cholesterol, sugars, & by not smoking.     VACCINES: Yearly FLU shot, ONE PNEUMONIA shot after 65, ONE SHINGLES shot after 60    Screening  colonoscopy at AGE  50 & every 10 years to check for COLON CANCER,  one of the most common & preventable cancers. Repeat in 3 years if POLYP found    I recommend  high fiber (5 fresh fruits or vegetables daily), low fat diet and aerobic  exercise (huffing/ puffing/ sweating for 20 min straight at least 4 days a week)    Follow up yearly with mammogram (every 2 years) , fasting lipids, CMP, CBC, TSH prior.   ==============================================================    Increase WATER INTAKE - your body systems work best & smoothly with 8 glasses of water a day.     Increase FIBER INTAKE - your body is happiest with FIVE  fruits and  vegetables daily. Challenge yourself to eat FIVE fruit/ vegetable COLORS in a day. You will be amazed as your body regulates itself.     You could try taking ACIDOPHILUS or a PROBIOTIC daily (over the counter) - to give back the good normal bugs (normal gut Aletha) to your GI tract -- that we tend to kill off with our American diets    With respect to FIBER intake, also try eating Cheerios or Oatmeal daily. This absorbs water & help your colon to work smoothly.    If you have lots of GAS - Switch to NON-FERMENTABLE FIBER - calcium polycarbophil , 1-2tablets,  2 times a day    Drink enough water that your urine is clear (which hydrates your colon &  kidneys)    A FULL GLASS OF WATER UPON AWAKENING  will wake up your colon & start your GI tract moving.     Drink a glass of water before any caffeinated drink (coffee, iced tea, carbonated beverage, energy drink)    Decrease caffeine  & chocolate (caffeine is dehydrating)    GET MOVING-- Walking & being active (20 minutes most days of the week) tells your colon to start moving also. On the other hand, sitting in a chair most of the day tells your GI tract to slow to a halt. Give it a boost with exercise.    If you do get constipated & do not have a bowel movement after 3 days -- try 8 glasses of water a day, Colace stool softener 2 tablets  twice daily, AND a laxative twice daily. If no bowel movement after 24 hours, start an enema twice daily. Come in to see me if no bowel movement.

## 2017-06-21 NOTE — PATIENT INSTRUCTIONS
==============================================================      I'd like to advise you on current CANCER SCREENING recommendations:  ~~~~~~~~~~~~~~~~~~~~~~~~~~~~~~~~~~~~~~~~~~~~~~~~~~~~~~~~~~~~~   If all previous PAP SMEARS have been normal, no current problems, and no family history of female cancers, it is recommended to have Pap smear every 3 years (or after 29 yo, every 5 years with HPV co-testing).   MAMMOGRAM  every 1-2 years, from 50 - 74 years old. We can discuss your risk at 40 & determine whether to get mammogram sooner  Of course, I can perform this sooner if there is family history of cancer, or if you have problems or questions    RECOMMENDATIONS FOR FEMALES    Prevent the #1 cause of death- cardiovascular disease (HEART ATTACK & STROKE) by checking for normal blood pressure, cholesterol, sugars, & by not smoking.     VACCINES: Yearly FLU shot, ONE PNEUMONIA shot after 65, ONE SHINGLES shot after 60    Screening colonoscopy at AGE  50 & every 10 years to check for COLON CANCER,  one of the most common & preventable cancers. Repeat in 3 years if POLYP found    I recommend  high fiber (5 fresh fruits or vegetables daily), low fat diet and aerobic  exercise (huffing/ puffing/ sweating for 20 min straight at least 4 days a week)    Follow up yearly with mammogram (every 2 years) , fasting lipids, CMP, CBC, TSH prior.   ==============================================================    Increase WATER INTAKE - your body systems work best & smoothly with 8 glasses of water a day.     Increase FIBER INTAKE - your body is happiest with FIVE  fruits and  vegetables daily. Challenge yourself to eat FIVE fruit/ vegetable COLORS in a day. You will be amazed as your body regulates itself.     You could try taking ACIDOPHILUS or a PROBIOTIC daily (over the counter) - to give back the good normal bugs (normal gut Aletha) to your GI tract -- that we tend to kill off with our American diets    With respect to FIBER  intake, also try eating Cheerios or Oatmeal daily. This absorbs water & help your colon to work smoothly.    If you have lots of GAS - Switch to NON-FERMENTABLE FIBER - calcium polycarbophil , 1-2tablets,  2 times a day    Drink enough water that your urine is clear (which hydrates your colon &  kidneys)    A FULL GLASS OF WATER UPON AWAKENING  will wake up your colon & start your GI tract moving.     Drink a glass of water before any caffeinated drink (coffee, iced tea, carbonated beverage, energy drink)    Decrease caffeine  & chocolate (caffeine is dehydrating)    GET MOVING-- Walking & being active (20 minutes most days of the week) tells your colon to start moving also. On the other hand, sitting in a chair most of the day tells your GI tract to slow to a halt. Give it a boost with exercise.    If you do get constipated & do not have a bowel movement after 3 days -- try 8 glasses of water a day, Colace stool softener 2 tablets twice daily, AND a laxative twice daily. If no bowel movement after 24 hours, start an enema twice daily. Come in to see me if no bowel movement.

## 2017-06-26 NOTE — PROGRESS NOTES
ENCOUNTER DATE 6/14/17    SITE: [facility]    INSIGHT ORIENTED PSYCHOTHERAPY: 51193 45-50 min    Met with patient .    ENCOUNTER REASON/CHIEF COMPLAINT(symptom, problem, diagnosis, follow-up, reason for encounter):   The patient presents for follow up for coping skills to better manage her anxiety, depression and stress. pt. struggles with self regulation of her emotional states.     INTERVAL EVENTS: Pt was last seen 1 month ago for session. Pt presents alone for session. Pt continues to complain of depression and anxiety symptoms. Pt discussed how they spent father's day.  Pt continues to over think past events that may have contributed to her difficulties she has with her daughter. Clinician continues to challenge pt to focus on her & now issues work on present day activities to become more engaged in her own life. Pt denies any si. pt continues to worry over future events regarding the status of their health particularly her 's eye sight as there has been further detertoriation of his eye sight-eventual blindness has been the prognosis and pt worries she will develop alzheimer's like her mother. Pt worries as to how they will be cared for in the future. Clinician continues to work with pt to reframe concerns in practical ways that can be better addressed to alleviate her concerns.  Pt continues to be very dependent on her  to manage most of their affairs etc. It has only been since pt's  can no longer drive that pt has had to take more of a leadership role at least with driving them to where they need to go. Pt's  still manages their financial affairs and pays bill mostly all on-line. Clinician continues to work with pt on utilizing cbt skills to better manage and tolerate distress and improve interpersonal relationship skills. Pt's effort has been minimal. Pt worked on setting new goals for next meeting. Pt reports to continue medication regimen as prescribed.  Clinician continues  to work with pt on self acceptance, being more realistic with her self and others and working on identifying her excessive guilt especially as it relates to her family e.g. Relationship with children.  Clinician continues to challenge pt to work on having more structure in her daily scheduled and to work on being mindful of how much negative thinking she is dwelling on rather engaging in positive activities & focus on the present. Pt continues to report she has been compliant with medications since her last session. Pt presents more casually dressed and groomed than usual-Mood: more-depressed/anxious,  Affect:appropriate to circumstance, TP: circumstantial,tangential , No rapid & pressured speech,  racing thoughts, paranoid thinking, agitation, mood swings, tearful episodes (+) worry,(+) obsessive & intrusive thoughts,Sleep: improved, No SI,insight/judgement: fair/improving.  Pt continues to present with automatic negative thoughts about her self continues to have insecurities, ego dystonic and extreme guilt over her lack of involvement with her mother.  Clinician continues to challenge these negative thoughts, focus on challenge statements, worked on mindfulness become of aware of her distortions that contribute to her feelings anxious and depressed. Pt continues to discuss stress mgmt coping skills, family dynamics. Pt continues to struggle with interpersonal relationships and family dynamics.      TREATMENT PLAN:     No changes in treatment plan from chart note of (date): 1/26/2012    Target Symptoms: Mood swings,depression, anxiety, irritability,anergiam anhedonia, affective lability, poor interpersonal relationships, co dependent bx's, guilt and racing thoughts, no motivation, frequent tearful episodes, cognitive distortions, paranoid & hopeless.    Therapeutic Intervention type:   Support  Behavior Modification  Medication Mgmt  Why chosen therapy is appropriate versus another modality:  Relevant to  diagnosis:evidenced based.  Patient responds to this modality  Outcome monitoring methods:  Self-Report  Observation    RISK PARAMETERS:     Patient reports no suicidal ideation.  Patient reports no homicidal ideation.  Patient reports no self injurious behavior.  Patient reports no violent behavior.    PATIENT'S RESPONSE TO INTERVENTION:   The patient's response to intervention is accepting.    PROGRESS TOWARD GOALS AND OTHER MENTAL STATUS CHANGES:   The patient's progress toward goals is poor.    DIAGNOSIS  Bipolar DO, most recent manic  (296.52)  HUMPHREY (300.02)  Personality NOS (301.89)  Family Circumstance NOS/Caring for an aging parent with dementia (V61.3)    GLOBAL ASSESSMENT OF FUNCTION SCALE:  The patient's GAF is 70    PLAN:   Pt. should start to attend individual therapy once a week.   Medication Management: Pt is currently being followed by Dr. Mack.   See physician notes.  Return to clinic in 2-4 weeks.

## 2017-08-14 ENCOUNTER — OFFICE VISIT (OUTPATIENT)
Dept: PSYCHIATRY | Facility: CLINIC | Age: 57
End: 2017-08-14
Payer: COMMERCIAL

## 2017-08-14 DIAGNOSIS — Z63.9 FAMILY PROBLEMS: ICD-10-CM

## 2017-08-14 DIAGNOSIS — F31.32 BIPOLAR AFFECTIVE DISORDER, CURRENTLY DEPRESSED, MODERATE: Primary | ICD-10-CM

## 2017-08-14 DIAGNOSIS — Z65.8 INTERPERSONAL PROBLEM: ICD-10-CM

## 2017-08-14 DIAGNOSIS — F41.1 GAD (GENERALIZED ANXIETY DISORDER): ICD-10-CM

## 2017-08-14 PROCEDURE — 90834 PSYTX W PT 45 MINUTES: CPT | Mod: S$GLB,,, | Performed by: SOCIAL WORKER

## 2017-08-14 PROCEDURE — 99999 PR PBB SHADOW E&M-EST. PATIENT-LVL I: CPT | Mod: PBBFAC,,, | Performed by: SOCIAL WORKER

## 2017-08-14 SDOH — SOCIAL DETERMINANTS OF HEALTH (SDOH): PROBLEM RELATED TO PRIMARY SUPPORT GROUP, UNSPECIFIED: Z63.9

## 2017-08-14 NOTE — PROGRESS NOTES
ENCOUNTER DATE 8/14/17    SITE: [facility]    INSIGHT ORIENTED PSYCHOTHERAPY: 71014 45-50 min    Met with patient .    ENCOUNTER REASON/CHIEF COMPLAINT(symptom, problem, diagnosis, follow-up, reason for encounter):   The patient presents for follow up for coping skills to better manage her anxiety, depression and stress. pt. struggles with self regulation of her emotional states.     INTERVAL EVENTS: Pt was last seen 1 month ago for session. Pt presents alone for session. Pt reports she is continuing to participate in her yoga class. Pt also reports she and her  recently purchased a new apartment building. It consist of commercial/retail space and then three separate apartments for individual/families. Pt reports she has been excited and eager to start renovations and upgrading it for tenants. Pt reports the acquisition of this building has shifted her focus recently and is not dwelling as much on negative thoughts of the past particularly with her adult children.  Pt denies any si. pt continues to worry over future events regarding the status of their health particularly her 's eye sight as there has been further detertoriation of his eye sight-eventual blindness has been the prognosis and pt worries she will develop alzheimer's like her mother. Pt worries as to how they will be cared for in the future. Clinician continues to work with pt to reframe concerns in practical ways that can be better addressed to alleviate her concerns.  Pt continues to be very dependent on her  to manage most of their affairs etc. It has only been since pt's  can no longer drive that pt has had to take more of a leadership role at least with driving them to where they need to go. Pt's  still manages their financial affairs and pays bill mostly all on-line. Clinician continues to work with pt on utilizing cbt skills to better manage and tolerate distress and improve interpersonal relationship skills.  Pt's effort has been minimal. Pt worked on setting new goals for next meeting. Pt reports to continue medication regimen as prescribed.  Clinician continues to work with pt on self acceptance, being more realistic with her self and others and working on identifying her excessive guilt especially as it relates to her family e.g. Relationship with children.  Clinician continues to challenge pt to work on having more structure in her daily scheduled and to work on being mindful of how much negative thinking she is dwelling on rather engaging in positive activities & focus on the present. Pt continues to report she has been compliant with medications since her last session. Mood: less depressed/anxious,  Affect:appropriate to circumstance, TP: circumstantial,tangential , No rapid & pressured speech,  racing thoughts, paranoid thinking, agitation, mood swings, tearful episodes (+) worry,(+) obsessive & intrusive thoughts, ruminates on past events that tend to be negative with her adult children,Sleep: improved, No SI,insight/judgement: fair/improving.  Pt continues to present with automatic negative thoughts about her self continues to have insecurities, ego dystonic and continues to express extreme guilt over her lack of involvement with her mother.  Clinician continues to challenge these negative thoughts, focus on challenge statements, worked on mindfulness become of aware of her distortions that contribute to her feelings anxious and depressed. Pt continues to discuss stress mgmt coping skills, family dynamics. Pt continues to struggle with interpersonal relationships and family dynamics.      TREATMENT PLAN:     No changes in treatment plan from chart note of (date): 1/26/2012    Target Symptoms: Mood swings,depression, anxiety, irritability,anergiam anhedonia, affective lability, poor interpersonal relationships, co dependent bx's, guilt and racing thoughts, no motivation, frequent tearful episodes, cognitive  distortions, paranoid & hopeless.    Therapeutic Intervention type:   Support  Behavior Modification  Medication Mgmt  Why chosen therapy is appropriate versus another modality:  Relevant to diagnosis:evidenced based.  Patient responds to this modality  Outcome monitoring methods:  Self-Report  Observation    RISK PARAMETERS:     Patient reports no suicidal ideation.  Patient reports no homicidal ideation.  Patient reports no self injurious behavior.  Patient reports no violent behavior.    PATIENT'S RESPONSE TO INTERVENTION:   The patient's response to intervention is accepting.    PROGRESS TOWARD GOALS AND OTHER MENTAL STATUS CHANGES:   The patient's progress toward goals is poor.    DIAGNOSIS  Bipolar DO, most recent manic  (296.52)  HUMPHREY (300.02)  Personality NOS (301.89)  Family Circumstance NOS/Caring for an aging parent with dementia (V61.3)    GLOBAL ASSESSMENT OF FUNCTION SCALE:  The patient's GAF is 70    PLAN:   Pt. should start to attend individual therapy once a week.   Medication Management: Pt is currently being followed by Dr. Mack.   See physician notes.  Return to clinic in 2-4 weeks.

## 2017-09-11 ENCOUNTER — OFFICE VISIT (OUTPATIENT)
Dept: PSYCHIATRY | Facility: CLINIC | Age: 57
End: 2017-09-11
Payer: COMMERCIAL

## 2017-09-11 DIAGNOSIS — Z63.9 FAMILY PROBLEMS: ICD-10-CM

## 2017-09-11 DIAGNOSIS — F41.1 GAD (GENERALIZED ANXIETY DISORDER): Primary | ICD-10-CM

## 2017-09-11 DIAGNOSIS — F31.31 BIPOLAR AFFECTIVE DISORDER, CURRENTLY DEPRESSED, MILD: ICD-10-CM

## 2017-09-11 PROCEDURE — 90834 PSYTX W PT 45 MINUTES: CPT | Mod: S$GLB,,, | Performed by: SOCIAL WORKER

## 2017-09-11 SDOH — SOCIAL DETERMINANTS OF HEALTH (SDOH): PROBLEM RELATED TO PRIMARY SUPPORT GROUP, UNSPECIFIED: Z63.9

## 2017-09-18 NOTE — PROGRESS NOTES
ENCOUNTER DATE 9/11/17    SITE: [facility]    INSIGHT ORIENTED PSYCHOTHERAPY: [id number] 45-50 min    Met with patient .    ENCOUNTER REASON/CHIEF COMPLAINT(symptom, problem, diagnosis, follow-up, reason for encounter):   The patient presents for follow up for coping skills to better manage her anxiety, depression and stress. pt. struggles with self regulation of her emotional states.     INTERVAL EVENTS: Pt was last seen 1 month ago for session. Pt presents alone for session. Pt focussed most of her session expressing her concerns over her granddaughter Kaelyn. Pt continues to struggle over the family dynamics particularly with her daughter's choices. Pt reports her granddaughter is requesting to live with her father full time and only see her mother on weekends. Pt reports she is worried that her granddaughter is going to make poor choices bc her of her daughter's inability to consistently parent her children.  Pt reports she is continuing to participate in her yoga class. Pt also reports she and her  recently purchased a new apartment building. Pt reports she has also been busy with the renovations of the new apt building she and her  recently acquired.   Pt denies any si. pt continues to worry over future events regarding the status of their health particularly her 's eye sight as there has been further detertoriation of his eye sight-eventual blindness has been the prognosis and pt worries she will develop alzheimer's like her mother. Pt worries as to how they will be cared for in the future. Clinician continues to work with pt to reframe concerns in practical ways that can be better addressed to alleviate her concerns.  Pt continues to be very dependent on her  to manage most of their affairs etc. It has only been since pt's  can no longer drive that pt has had to take more of a leadership role at least with driving them to where they need to go.. Clinician continues to work  with pt on utilizing cbt skills to better manage and tolerate distress and improve interpersonal relationship skills.  Pt reports to continue medication regimen as prescribed.  Clinician continues to work with pt on self acceptance, being more realistic with her self and others and working on identifying her excessive guilt especially as it relates to her family e.g. Relationship with children.  Clinician continues to challenge pt to work on having more structure in her daily scheduled and to work on being mindful of how much negative thinking she is dwelling on rather engaging in positive activities & focus on the present. Pt continues to report she has been compliant with medications since her last session. Mood: less depressed/anxious,  Affect:appropriate to circumstance, TP: circumstantial,tangential , No rapid & pressured speech,  racing thoughts, paranoid thinking, agitation, mood swings, tearful episodes (+) worry,(+) obsessive & intrusive thoughts, ruminates on past events that tend to be negative with her adult children,Sleep: improved, No SI,insight/judgement: fair/improving.  Pt continues to present with automatic negative thoughts about her self continues to have insecurities, ego dystonic and continues to express extreme guilt over her lack of involvement with her mother.  Clinician continues to challenge these negative thoughts, focus on challenge statements, worked on mindfulness become of aware of her distortions that contribute to her feelings anxious and depressed. Pt continues to discuss stress mgmt coping skills, family dynamics. Pt continues to struggle with interpersonal relationships and family dynamics.      TREATMENT PLAN:     No changes in treatment plan from chart note of (date): 1/26/2012    Target Symptoms: Mood swings,depression, anxiety, irritability,anergiam anhedonia, affective lability, poor interpersonal relationships, co dependent bx's, guilt and racing thoughts, no motivation,  frequent tearful episodes, cognitive distortions, paranoid & hopeless.    Therapeutic Intervention type:   Support  Behavior Modification  Medication Mgmt  Why chosen therapy is appropriate versus another modality:  Relevant to diagnosis:evidenced based.  Patient responds to this modality  Outcome monitoring methods:  Self-Report  Observation    RISK PARAMETERS:     Patient reports no suicidal ideation.  Patient reports no homicidal ideation.  Patient reports no self injurious behavior.  Patient reports no violent behavior.    PATIENT'S RESPONSE TO INTERVENTION:   The patient's response to intervention is accepting.    PROGRESS TOWARD GOALS AND OTHER MENTAL STATUS CHANGES:   The patient's progress toward goals is poor.    DIAGNOSIS  Bipolar DO, most recent manic  (296.52)  HUMPHREY (300.02)  Personality NOS (301.89)  Family Circumstance NOS/Caring for an aging parent with dementia (V61.3)    GLOBAL ASSESSMENT OF FUNCTION SCALE:  The patient's GAF is 70    PLAN:   Pt. should start to attend individual therapy once a week.   Medication Management: Pt is currently being followed by Dr. Mack.   See physician notes.  Return to clinic in 2-4 weeks.

## 2017-10-16 ENCOUNTER — OFFICE VISIT (OUTPATIENT)
Dept: PSYCHIATRY | Facility: CLINIC | Age: 57
End: 2017-10-16
Payer: COMMERCIAL

## 2017-10-16 DIAGNOSIS — Z65.8 INTERPERSONAL PROBLEM: ICD-10-CM

## 2017-10-16 DIAGNOSIS — F31.31 BIPOLAR AFFECTIVE DISORDER, CURRENTLY DEPRESSED, MILD: Primary | ICD-10-CM

## 2017-10-16 DIAGNOSIS — F41.1 GAD (GENERALIZED ANXIETY DISORDER): ICD-10-CM

## 2017-10-16 DIAGNOSIS — Z63.9 FAMILY PROBLEMS: ICD-10-CM

## 2017-10-16 PROCEDURE — 90834 PSYTX W PT 45 MINUTES: CPT | Mod: S$GLB,,, | Performed by: SOCIAL WORKER

## 2017-10-16 SDOH — SOCIAL DETERMINANTS OF HEALTH (SDOH): PROBLEM RELATED TO PRIMARY SUPPORT GROUP, UNSPECIFIED: Z63.9

## 2017-10-17 NOTE — PROGRESS NOTES
ENCOUNTER DATE 10/16 /17    SITE: [facility]    INSIGHT ORIENTED PSYCHOTHERAPY: [id number] 45-50 min    Met with patient .    ENCOUNTER REASON/CHIEF COMPLAINT(symptom, problem, diagnosis, follow-up, reason for encounter):   The patient presents for follow up for coping skills to better manage her anxiety, depression and stress. pt. struggles with self regulation of her emotional states.     INTERVAL EVENTS: Pt was last seen 1 month ago for session. Pt presents alone for session. Pt reports the change in the weather has affected her depression sx's. Clinician explored the option of using a light box to help with pt getting enough day light. Pt reports additionally there were a recent family party that she became aware of and her feelings were hurt because she and her  were not invited to the event. Pt also discussed that her grand daughter did make her father's home her primary residence which upset her daughter however pt understands and supports her grand daughter's wishes. Pt reports she still able to see her twice a week after school and does not have a problem with getting along with her former son-in-law.  Pt reports however to retaliate her daughter is now not letting her have visits with her grandson (who is 4 years old). Pt reports she continues to have regular contact with her friends and is getting to participate in Yoga, Pottery and having regular get together's with her friends.  Pt reports she is continuing to participate in her yoga class. Pt also reports she and her  recently purchased a new apartment building. Pt reports she has also been busy with the renovations of the new apt building she and her  recently acquired and reports this has helped to give her a new focus. Pt denies any si. pt continues to worry over future events regarding the status of their health particularly her 's eye sight as there has been further detertoriation of his eye sight-eventual blindness has  been the prognosis Clinician continues to work with pt to reframe concerns in practical ways that can be better addressed to alleviate her concerns. Clinician continues to work with pt on utilizing cbt skills to better manage and tolerate distress and improve interpersonal relationship skills.  Pt reports to continue medication regimen as prescribed.  Clinician continues to work with pt on self acceptance, being more realistic with her self and others and working on identifying her excessive guilt especially as it relates to her family e.g. Relationship with children.  Clinician continues to challenge pt to work on having more structure in her daily scheduled and to work on being mindful of how much negative thinking she is dwelling on rather engaging in positive activities & focus on the present. Pt continues to report she has been compliant with medications since her last session.     Mood: depressed/anxious,  Affect:appropriate to circumstance, TP: circumstantial,tangential , No rapid & pressured speech,  racing thoughts, paranoid thinking, agitation, mood swings, tearful episodes (+) worry,(+) obsessive & intrusive thoughts, ruminates on past events that tend to be negative with her adult children,Sleep: improved, No SI,insight/judgement: fair/improving.  Pt continues to present with automatic negative thoughts about her self continues to have insecurities, ego dystonic and continues to express extreme guilt over her lack of involvement with her mother.  Clinician continues to challenge these negative thoughts, focus on challenge statements, worked on mindfulness become of aware of her distortions that contribute to her feelings anxious and depressed. Pt continues to discuss stress mgmt coping skills, family dynamics. Pt continues to struggle with interpersonal relationships and family dynamics.      TREATMENT PLAN:     No changes in treatment plan from chart note of (date): 1/26/2012    Target Symptoms: Mood  swings,depression, anxiety, irritability,anergiam anhedonia, affective lability, poor interpersonal relationships, co dependent bx's, guilt and racing thoughts, no motivation, frequent tearful episodes, cognitive distortions, paranoid & hopeless.    Therapeutic Intervention type:   Support  Behavior Modification  Medication Mgmt  Why chosen therapy is appropriate versus another modality:  Relevant to diagnosis:evidenced based.  Patient responds to this modality  Outcome monitoring methods:  Self-Report  Observation    RISK PARAMETERS:     Patient reports no suicidal ideation.  Patient reports no homicidal ideation.  Patient reports no self injurious behavior.  Patient reports no violent behavior.    PATIENT'S RESPONSE TO INTERVENTION:   The patient's response to intervention is accepting.    PROGRESS TOWARD GOALS AND OTHER MENTAL STATUS CHANGES:   The patient's progress toward goals is poor.    DIAGNOSIS  Bipolar DO, most recent manic  (296.52)  HUMPHREY (300.02)  Personality NOS (301.89)  Family Circumstance NOS/Caring for an aging parent with dementia (V61.3)    GLOBAL ASSESSMENT OF FUNCTION SCALE:  The patient's GAF is 70    PLAN:   Pt. should start to attend individual therapy once a week.   Medication Management: Pt is currently being followed by Dr. Mack.   See physician notes.  Return to clinic in 2-4 weeks.

## 2017-12-11 ENCOUNTER — OFFICE VISIT (OUTPATIENT)
Dept: PSYCHIATRY | Facility: CLINIC | Age: 57
End: 2017-12-11
Payer: COMMERCIAL

## 2017-12-11 DIAGNOSIS — Z63.9 FAMILY PROBLEMS: ICD-10-CM

## 2017-12-11 DIAGNOSIS — Z65.8 INTERPERSONAL PROBLEM: ICD-10-CM

## 2017-12-11 DIAGNOSIS — F31.31 BIPOLAR AFFECTIVE DISORDER, CURRENTLY DEPRESSED, MILD: Primary | ICD-10-CM

## 2017-12-11 DIAGNOSIS — F32.A DEPRESSION, UNSPECIFIED DEPRESSION TYPE: ICD-10-CM

## 2017-12-11 DIAGNOSIS — F41.1 GAD (GENERALIZED ANXIETY DISORDER): ICD-10-CM

## 2017-12-11 PROCEDURE — 90834 PSYTX W PT 45 MINUTES: CPT | Mod: S$GLB,,, | Performed by: SOCIAL WORKER

## 2017-12-11 SDOH — SOCIAL DETERMINANTS OF HEALTH (SDOH): PROBLEM RELATED TO PRIMARY SUPPORT GROUP, UNSPECIFIED: Z63.9

## 2017-12-12 NOTE — PROGRESS NOTES
ENCOUNTER DATE 12/11 /17    SITE: [facility]    INSIGHT ORIENTED PSYCHOTHERAPY: [id number] 45-50 min    Met with patient .    ENCOUNTER REASON/CHIEF COMPLAINT(symptom, problem, diagnosis, follow-up, reason for encounter):   The patient presents for follow up for coping skills to better manage her anxiety, depression and stress. pt. struggles with self regulation of her emotional states.     INTERVAL EVENTS: Pt was last seen 1 month ago for session. Pt presents alone for session. Pt reports have a challenging month since she was last seen for session. Pt reports her grandaughter has moved in with her part time due to her father recently being arrested for selling drugs. Pt reports she will be alternating each other week between her home and then the her granddaughter's paternal grandparents. Pt reports she received a call at ll pm at night by her granddaughter to come over to pick her up. Pt reports this has changed her focus from her own issues to help stabilize her grand daughter's situation. Pt reports she has also decorated from [email or web address] and is working on fixing up on of her guest bedrooms for this patient. Pt also reports she continues to have regular contact with her friends and is getting to participate in Yoga, Pottery and having regular get together's with her friends.  Pt reports she is continuing to participate in her yoga class. Pt reports she has also been busy with the renovations of the new [facility] building she and her  recently acquired and reports this has continued to help redirect her  focus. Pt denies any si. pt continues to worry over future events regarding the status of their health particularly her 's eye sight as there has been further detertoriation of his eye sight-eventual blindness has been the prognosis Clinician continues to work with pt to reframe concerns in practical ways that can be better addressed to alleviate her concerns. Clinician continues to work with pt on utilizing  cbt skills to better manage and tolerate distress and improve interpersonal relationship skills.  Pt reports to continue medication regimen as prescribed.  Clinician continues to work with pt on self acceptance, being more realistic with her self and others and working on identifying her excessive guilt especially as it relates to her family e.g. Relationship with children.  Clinician continues to challenge pt to work on having more structure in her daily scheduled and to work on being mindful of how much negative thinking she is dwelling on rather engaging in positive activities & focus on the present. Pt continues to report she has been compliant with medications since her last session.     Mood: depressed/anxious,  Affect:appropriate to circumstance, TP: circumstantial,tangential , No rapid & pressured speech,  racing thoughts, paranoid thinking, agitation, mood swings, tearful episodes (+) worry,(+) obsessive & intrusive thoughts, ruminates on past events that tend to be negative with her adult children,Sleep: improved, No SI,insight/judgement: fair/improving.  Pt continues to present with automatic negative thoughts about her self continues to have insecurities, ego dystonic and continues to express extreme guilt over her lack of involvement with her mother.  Clinician continues to challenge these negative thoughts, focus on challenge statements, worked on mindfulness become of aware of her distortions that contribute to her feelings anxious and depressed. Pt continues to discuss stress mgmt coping skills, family dynamics. Pt continues to struggle with interpersonal relationships and family dynamics.      TREATMENT PLAN:     No changes in treatment plan from chart note of (date): 1/26/2012    Target Symptoms: Mood swings,depression, anxiety, irritability,anergiam anhedonia, affective lability, poor interpersonal relationships, co dependent bx's, guilt and racing thoughts, no motivation, frequent tearful  episodes, cognitive distortions, paranoid & hopeless.    Therapeutic Intervention type:   Support  Behavior Modification  Medication Mgmt  Why chosen therapy is appropriate versus another modality:  Relevant to diagnosis:evidenced based.  Patient responds to this modality  Outcome monitoring methods:  Self-Report  Observation    RISK PARAMETERS:     Patient reports no suicidal ideation.  Patient reports no homicidal ideation.  Patient reports no self injurious behavior.  Patient reports no violent behavior.    PATIENT'S RESPONSE TO INTERVENTION:   The patient's response to intervention is accepting.    PROGRESS TOWARD GOALS AND OTHER MENTAL STATUS CHANGES:   The patient's progress toward goals is poor.    DIAGNOSIS  Bipolar DO, most recent manic  (296.52)  HUMPHREY (300.02)  Personality NOS (301.89)  Family Circumstance NOS/Caring for an aging parent with dementia (V61.3)    GLOBAL ASSESSMENT OF FUNCTION SCALE:  The patient's GAF is 70    PLAN:   Pt. should start to attend individual therapy once a week.   Medication Management: Pt is currently being followed by Dr. Mack.   See physician notes.  Return to clinic in 2-4 weeks.

## 2018-01-31 ENCOUNTER — OFFICE VISIT (OUTPATIENT)
Dept: PSYCHIATRY | Facility: CLINIC | Age: 58
End: 2018-01-31
Payer: COMMERCIAL

## 2018-01-31 DIAGNOSIS — F41.1 GAD (GENERALIZED ANXIETY DISORDER): ICD-10-CM

## 2018-01-31 DIAGNOSIS — Z63.9 FAMILY PROBLEMS: ICD-10-CM

## 2018-01-31 DIAGNOSIS — Z65.8 INTERPERSONAL PROBLEM: ICD-10-CM

## 2018-01-31 DIAGNOSIS — F31.11 BIPOLAR AFFECTIVE DISORDER, CURRENTLY MANIC, MILD: Primary | ICD-10-CM

## 2018-01-31 DIAGNOSIS — F33.1 MODERATE EPISODE OF RECURRENT MAJOR DEPRESSIVE DISORDER: ICD-10-CM

## 2018-01-31 PROCEDURE — 90834 PSYTX W PT 45 MINUTES: CPT | Mod: S$GLB,,, | Performed by: SOCIAL WORKER

## 2018-01-31 SDOH — SOCIAL DETERMINANTS OF HEALTH (SDOH): PROBLEM RELATED TO PRIMARY SUPPORT GROUP, UNSPECIFIED: Z63.9

## 2018-02-19 ENCOUNTER — OFFICE VISIT (OUTPATIENT)
Dept: PSYCHIATRY | Facility: CLINIC | Age: 58
End: 2018-02-19
Payer: COMMERCIAL

## 2018-02-19 DIAGNOSIS — F41.1 GAD (GENERALIZED ANXIETY DISORDER): ICD-10-CM

## 2018-02-19 DIAGNOSIS — Z65.8 INTERPERSONAL PROBLEM: ICD-10-CM

## 2018-02-19 DIAGNOSIS — F32.A DEPRESSION, UNSPECIFIED DEPRESSION TYPE: ICD-10-CM

## 2018-02-19 DIAGNOSIS — F31.31 BIPOLAR AFFECTIVE DISORDER, CURRENTLY DEPRESSED, MILD: Primary | ICD-10-CM

## 2018-02-19 DIAGNOSIS — Z63.9 FAMILY PROBLEMS: ICD-10-CM

## 2018-02-19 PROCEDURE — 90834 PSYTX W PT 45 MINUTES: CPT | Mod: S$GLB,,, | Performed by: SOCIAL WORKER

## 2018-02-19 SDOH — SOCIAL DETERMINANTS OF HEALTH (SDOH): PROBLEM RELATED TO PRIMARY SUPPORT GROUP, UNSPECIFIED: Z63.9

## 2018-02-20 NOTE — PROGRESS NOTES
ENCOUNTER DATE 2/19/18    SITE: [facility]    INSIGHT ORIENTED PSYCHOTHERAPY: 92489 45-50 min    Met with patient .    ENCOUNTER REASON/CHIEF COMPLAINT(symptom, problem, diagnosis, follow-up, reason for encounter):   The patient presents for follow up for coping skills to better manage her anxiety, depression and stress. pt. struggles with self regulation of her emotional states.     INTERVAL EVENTS: Pt was last seen 1 month ago for session. Pt presents alone for session. Pt continues to reports have a challenging month since she was last seen for session. Pt also reports she continues to have regular contact with her friends and is getting to participate in Yoga, Pottery and having regular get together's with her friends.  Pt reports she is continuing to participate in her yoga class. Pt reports she continues to be very involved in her granddaughter's activities with school and now with softball. Pt reports her granddaughter's father's hearing is scheduled for next month and reports to be very worried about the outcome from this hearing. Pt reports she is happy to see her granddaughter's father is doing a lot more with her seems to be making all events etc. Pt reports she has a busy weekend with a several family/friend activities. Pt reports she recently had a medication check with her psychiatrist-Dr. Lourdes Mack and their has been a medication change for pt to increase her seroquel to two tabs in the am since she has been feeling more overwhelmed and describes a feeling boarding on feeling manic. Pt reports she continues to worry about the future with her  and how she will manage to learn to handle bills and finances when eventually her 's eyesight will be completely gone. Pt discussed in detail her fears of technology and her fear of learning how to manage finances on her computer. Clinician explored how pt could take an intro course on how to use a computer, purchase a simple laptop to learn  on.  Clinician continues to work with pt to reframe concerns in practical ways that can be better addressed to alleviate her concerns. Clinician continues to work with pt on utilizing cbt skills to better manage and tolerate distress and improve interpersonal relationship skills.  Pt reports to continue medication regimen as prescribed.  Clinician continues to work with pt on self acceptance, being more realistic with her self and others and working on identifying her excessive guilt especially as it relates to her family e.g. Relationship with children.  Clinician continues to challenge pt to work on having more structure in her daily scheduled and to work on being mindful of how much negative thinking she is dwelling on rather engaging in positive activities & focus on the present. Pt continues to report she has been compliant with medications since her last session.     Mood: more anxious,  Affect:appropriate to circumstance, TP: circumstantial,tangential ,slight- rapid & pressured speech,  (+)racing thoughts, paranoid thinking, agitation, mood swings,(+) tearful episodes (+) worry,(+) obsessive & intrusive thoughts, ruminates on past events that tend to be negative with her adult children,Sleep: improved, No SI,insight/judgement: fair/improving.  Pt continues to present with automatic negative thoughts about her self continues to have insecurities, ego dystonic and continues to express extreme guilt over her lack of involvement with her mother.  Clinician continues to challenge these negative thoughts, focus on challenge statements, worked on mindfulness become of aware of her distortions that contribute to her feelings anxious and depressed. Pt continues to discuss stress mgmt coping skills, family dynamics. Pt continues to struggle with interpersonal relationships and family dynamics.      TREATMENT PLAN:     No changes in treatment plan from chart note of (date): 1/26/2012    Target Symptoms: Mood  swings,depression, anxiety, irritability,anergiam anhedonia, affective lability, poor interpersonal relationships, co dependent bx's, guilt and racing thoughts, no motivation, frequent tearful episodes, cognitive distortions, paranoid & hopeless.    Therapeutic Intervention type:   Support  Behavior Modification  Medication Mgmt  Why chosen therapy is appropriate versus another modality:  Relevant to diagnosis:evidenced based.  Patient responds to this modality  Outcome monitoring methods:  Self-Report  Observation    RISK PARAMETERS:     Patient reports no suicidal ideation.  Patient reports no homicidal ideation.  Patient reports no self injurious behavior.  Patient reports no violent behavior.    PATIENT'S RESPONSE TO INTERVENTION:   The patient's response to intervention is accepting.    PROGRESS TOWARD GOALS AND OTHER MENTAL STATUS CHANGES:   The patient's progress toward goals is poor.    DIAGNOSIS  Bipolar DO, most recent manic  (296.52)  HUMPHREY (300.02)  Personality NOS (301.89)  Family Circumstance NOS/Caring for an aging parent with dementia (V61.3)    GLOBAL ASSESSMENT OF FUNCTION SCALE:  The patient's GAF is 70    PLAN:   Pt. should start to attend individual therapy once a week.   Medication Management: Pt is currently being followed by Dr. Mack.   See physician notes.  Return to clinic in 2-4 weeks.

## 2018-03-05 ENCOUNTER — OFFICE VISIT (OUTPATIENT)
Dept: PSYCHIATRY | Facility: CLINIC | Age: 58
End: 2018-03-05
Payer: COMMERCIAL

## 2018-03-05 DIAGNOSIS — Z65.8 INTERPERSONAL PROBLEM: ICD-10-CM

## 2018-03-05 DIAGNOSIS — Z63.9 FAMILY PROBLEMS: ICD-10-CM

## 2018-03-05 DIAGNOSIS — F41.1 GAD (GENERALIZED ANXIETY DISORDER): ICD-10-CM

## 2018-03-05 DIAGNOSIS — F31.31 BIPOLAR AFFECTIVE DISORDER, CURRENTLY DEPRESSED, MILD: Primary | ICD-10-CM

## 2018-03-05 DIAGNOSIS — F32.A DEPRESSION, UNSPECIFIED DEPRESSION TYPE: ICD-10-CM

## 2018-03-05 PROCEDURE — 90834 PSYTX W PT 45 MINUTES: CPT | Mod: S$GLB,,, | Performed by: SOCIAL WORKER

## 2018-03-05 SDOH — SOCIAL DETERMINANTS OF HEALTH (SDOH): PROBLEM RELATED TO PRIMARY SUPPORT GROUP, UNSPECIFIED: Z63.9

## 2018-03-06 NOTE — PROGRESS NOTES
ENCOUNTER DATE 3/5/18    SITE: [facility]    INSIGHT ORIENTED PSYCHOTHERAPY: [id number] 45-50 min    Met with patient .    ENCOUNTER REASON/CHIEF COMPLAINT(symptom, problem, diagnosis, follow-up, reason for encounter):   The patient presents for follow up for coping skills to better manage her anxiety, depression and stress. pt. struggles with self regulation of her emotional states.     INTERVAL EVENTS: Pt was last seen 2 weeks ago for session. Pt presents with her  for session. Pt continues to reports have a challenging month since she was last seen for session. Pt also reports she continues to have regular contact with her friends and is getting to participate in Yoga, Pottery and having regular get together's with her friends.  Pt reports she is continuing to participate in her yoga class. Pt reports she continues to be very involved in her granddaughter's activities with school and now with softball. Pt reports her granddaughter's father's hearing is scheduled for next month and reports to be very worried about the outcome from this hearing. Pt reports she is happy to see her granddaughter's father is doing a lot more with her seems to be making all events etc. Pt reports she has a busy weekend with a several family/friend  Pt reports she has been very upset and out of sorts since Saturday bc her sister stated she no longer is able to keep their mother for the whole week. Pt will have to work on taking their mother an extra day and this is pt completely overwhelmed.. Pt reports she recently had a medication check with her psychiatrist-Dr. Lourdes Mack and their has been a medication change for pt to increase her seroquel to two tabs in the am since she has been feeling more overwhelmed and describes a feeling boarding on feeling manic. Pt reports she continues to worry about the future with her  and how she will manage to learn to handle bills and finances when eventually her 's eyesight will  be completely gone. Pt discussed in detail her fears of technology and her fear of learning how to manage finances on her computer. Clinician explored how pt could take an intro course on how to use a computer, purchase a simple laptop to learn on.  Clinician continues to work with pt to reframe concerns in practical ways that can be better addressed to alleviate her concerns. Clinician continues to work with pt on utilizing cbt skills to better manage and tolerate distress and improve interpersonal relationship skills.  Pt reports to continue medication regimen as prescribed.  Clinician continues to work with pt on self acceptance, being more realistic with her self and others and working on identifying her excessive guilt especially as it relates to her family e.g. Relationship with children.  Clinician continues to challenge pt to work on having more structure in her daily scheduled and to work on being mindful of how much negative thinking she is dwelling on rather engaging in positive activities & focus on the present. Pt continues to report she has been compliant with medications since her last session.     Mood: more anxious,  Affect:appropriate to circumstance, TP: circumstantial,tangential ,slight- rapid & pressured speech,  (+)racing thoughts, paranoid thinking, agitation, mood swings,(+) tearful episodes (+) worry,(+) obsessive & intrusive thoughts, ruminates on past events that tend to be negative with her adult children,Sleep: improved, No SI,insight/judgement: fair/improving.  Pt continues to present with automatic negative thoughts about her self continues to have insecurities, ego dystonic and continues to express extreme guilt over her lack of involvement with her mother.  Clinician continues to challenge these negative thoughts, focus on challenge statements, worked on mindfulness become of aware of her distortions that contribute to her feelings anxious and depressed. Pt continues to discuss  stress mgmt coping skills, family dynamics. Pt continues to struggle with interpersonal relationships and family dynamics.      TREATMENT PLAN:     No changes in treatment plan from chart note of (date): 1/26/2012    Target Symptoms: Mood swings,depression, anxiety, irritability,anergiam anhedonia, affective lability, poor interpersonal relationships, co dependent bx's, guilt and racing thoughts, no motivation, frequent tearful episodes, cognitive distortions, paranoid & hopeless.    Therapeutic Intervention type:   Support  Behavior Modification  Medication Mgmt  Why chosen therapy is appropriate versus another modality:  Relevant to diagnosis:evidenced based.  Patient responds to this modality  Outcome monitoring methods:  Self-Report  Observation    RISK PARAMETERS:     Patient reports no suicidal ideation.  Patient reports no homicidal ideation.  Patient reports no self injurious behavior.  Patient reports no violent behavior.    PATIENT'S RESPONSE TO INTERVENTION:   The patient's response to intervention is accepting.    PROGRESS TOWARD GOALS AND OTHER MENTAL STATUS CHANGES:   The patient's progress toward goals is poor.    DIAGNOSIS  Bipolar DO, most recent manic  (296.52)  HUMPHREY (300.02)  Personality NOS (301.89)  Family Circumstance NOS/Caring for an aging parent with dementia (V61.3)    GLOBAL ASSESSMENT OF FUNCTION SCALE:  The patient's GAF is 70    PLAN:   Pt. should start to attend individual therapy once a week.   Medication Management: Pt is currently being followed by Dr. Mack.   See physician notes.  Return to clinic in 2-4 weeks.

## 2018-03-13 ENCOUNTER — OFFICE VISIT (OUTPATIENT)
Dept: FAMILY MEDICINE | Facility: CLINIC | Age: 58
End: 2018-03-13
Payer: COMMERCIAL

## 2018-03-13 VITALS
SYSTOLIC BLOOD PRESSURE: 119 MMHG | DIASTOLIC BLOOD PRESSURE: 80 MMHG | HEART RATE: 84 BPM | WEIGHT: 124.56 LBS | TEMPERATURE: 99 F | HEIGHT: 63 IN | BODY MASS INDEX: 22.07 KG/M2

## 2018-03-13 DIAGNOSIS — E78.5 HYPERLIPIDEMIA, UNSPECIFIED HYPERLIPIDEMIA TYPE: Primary | ICD-10-CM

## 2018-03-13 DIAGNOSIS — F41.1 GAD (GENERALIZED ANXIETY DISORDER): ICD-10-CM

## 2018-03-13 DIAGNOSIS — F31.31 BIPOLAR AFFECTIVE DISORDER, CURRENTLY DEPRESSED, MILD: ICD-10-CM

## 2018-03-13 PROCEDURE — 99212 OFFICE O/P EST SF 10 MIN: CPT | Mod: S$GLB,,, | Performed by: FAMILY MEDICINE

## 2018-03-13 PROCEDURE — 99999 PR PBB SHADOW E&M-EST. PATIENT-LVL III: CPT | Mod: PBBFAC,,, | Performed by: FAMILY MEDICINE

## 2018-03-13 NOTE — PROGRESS NOTES
Subjective:      Patient ID: Kimberly Jo is a 58 y.o. female.    Chief Complaint: Medication Management    Here today with her husmaria elena Choi for discussion of elevated cholesterol noted by her psychiatrist.  She takes Seroquel to help with bipolar which has greatly stabilized her moods.  She is concerned though, because cholesterol was elevated.  She does not want to take cholesterol medication.  She and her  walk 1 mile a few days a week outside.  She plans to restart yoga.  [facility], she has been eating more chips, french fries, natchos    From Dr Lourdes Mack - [facility] labs February 2018 total cholesterol 261, HDL excellent 85, triglycerides 91, .  Glucose 91, creatinine 0.88, AST 28, ALT 19, CBC normal, vitamin B12, vitamin D 40    Denies any chest pain, shortness of breath    The 10-year ASCVD risk score (Reanna COSTA Jr., et al., 2013) is: 2.9%    Values used to calculate the score:      Age: 58 years      Sex: Female      Is Non- : No      Diabetic: No      Tobacco smoker: No      Systolic Blood Pressure: 119 mmHg      Is BP treated: No      HDL Cholesterol: 72 mg/dL      Total Cholesterol: 261 mg/dL      No results found for: MICALBCREAT  Lab Results   Component Value Date    LDLCALC 169.8 (H) 06/15/2017    LDLCALC 134.4 06/09/2016    CHOL 261 (H) 06/15/2017    HDL 72 06/15/2017    TRIG 96 06/15/2017       Lab Results   Component Value Date     06/15/2017    K 3.8 06/15/2017     06/15/2017    CO2 24 06/15/2017    GLU 89 06/15/2017    BUN 8 06/15/2017    CREATININE 0.9 06/15/2017    CALCIUM 9.7 06/15/2017    PROT 7.5 06/15/2017    ALBUMIN 4.2 06/15/2017    BILITOT 0.6 06/15/2017    ALKPHOS 81 06/15/2017    AST 40 06/15/2017    ALT 65 (H) 06/15/2017    ANIONGAP 10 06/15/2017    ESTGFRAFRICA >60.0 06/15/2017    EGFRNONAA >60.0 06/15/2017    WBC 6.92 06/15/2017    HGB 14.2 06/15/2017    HCT 42.9 06/15/2017    MCV 99 (H) 06/15/2017    PLT  "246 06/15/2017    TSH 0.719 06/15/2017    ODBIRRHD67IF 30 12/04/2015         Current Outpatient Prescriptions on File Prior to Visit   Medication Sig    biotin 2,500 mcg Tab Take 50,000 mcg by mouth once daily.    cholecalciferol, vitamin D3, (VITAMIN D3) 2,000 unit Tab Take by mouth once daily.    diazePAM (VALIUM) 5 MG tablet Take 5 mg by mouth every 6 (six) hours as needed.     lamotrigine (LAMICTAL) 200 MG tablet 2 (two) times daily.     quetiapine (SEROQUEL XR) 400 MG Tb24 once daily at 6am. 450mg daily     No current facility-administered medications on file prior to visit.      Past Medical History:   Diagnosis Date    Depression     Dr Lourdes Mack & therapist Jaziel    History of herpes zoster 06/21/2017    tx GYN    HLD (hyperlipidemia) 2/2/2016    Slow transit constipation 2/2/2016    CS 52 yo    SVT (supraventricular tachycardia)     2015 ablated with Adenosine EMT (too much caffeine), cardiologist rec bblocker if it recurrs    Vitamin D deficiency     2015 OTC suppl     No past surgical history on file.  Social History     Social History Narrative    Homemaker, enjoys yoga,  with glaucoma, 2 children, nonsmoker, ETOH socially, GYN here & [place], nml pap 6/2015, normal colonoscopy 52 yo per pt with doctor on [place]     Family History   Problem Relation Age of Onset    Melanoma Father     Cancer Father     Heart disease Brother 28     after injury hit head    Hypertension Mother     Alzheimer's disease Mother     Breast cancer Neg Hx     Colon cancer Neg Hx     Ovarian cancer Neg Hx      Vitals:    03/13/18 1041   BP: 119/80   Pulse: 84   Temp: 98.9 °F (37.2 °C)   Weight: 56.5 kg (124 lb 9 oz)   Height: 5' 3" (1.6 m)   PainSc: 0-No pain     Objective:   Physical Exam   Psychiatric: She has a normal mood and affect. Her behavior is normal. Judgment and thought content normal.     Assessment:     1. Hyperlipidemia, unspecified hyperlipidemia type    2. Bipolar affective disorder, " currently depressed, mild    3. HUMPHREY (generalized anxiety disorder)      Plan:        I spent 10 minutes with patient, 75% of time spent counseling on medication options and side effects          Patient Instructions   Keep walking &  restart yoga.      Before heading to the [facility], make a healthy shake at home, bring fruits and nuts.  Stop eating chips, french fries, natchos    She will start back eating Oatmeal daily.     Continue seroquel & lamictal since they stablize her mood.     Follow with DR Mack & therapist Jaziel    She has upcoming physical with fasting blood work in June.  We can discuss LDL changes at that time

## 2018-03-13 NOTE — PATIENT INSTRUCTIONS
Keep walking &  restart yoga.      Before heading to the [facility], make a healthy shake at home, bring fruits and nuts.  Stop eating chips, french fries, natchos    She will start back eating Oatmeal daily.     Continue seroquel & lamictal since they stablize her mood.     Follow with DR Mack & therapist Jaziel    She has upcoming physical with fasting blood work in June.  We can discuss LDL changes at that time

## 2018-03-19 ENCOUNTER — OFFICE VISIT (OUTPATIENT)
Dept: PSYCHIATRY | Facility: CLINIC | Age: 58
End: 2018-03-19
Payer: COMMERCIAL

## 2018-03-19 DIAGNOSIS — Z65.8 INTERPERSONAL PROBLEM: ICD-10-CM

## 2018-03-19 DIAGNOSIS — F32.A DEPRESSION, UNSPECIFIED DEPRESSION TYPE: ICD-10-CM

## 2018-03-19 DIAGNOSIS — Z63.9 FAMILY PROBLEMS: ICD-10-CM

## 2018-03-19 DIAGNOSIS — F31.31 BIPOLAR AFFECTIVE DISORDER, CURRENTLY DEPRESSED, MILD: Primary | ICD-10-CM

## 2018-03-19 DIAGNOSIS — F41.1 GAD (GENERALIZED ANXIETY DISORDER): ICD-10-CM

## 2018-03-19 PROCEDURE — 90834 PSYTX W PT 45 MINUTES: CPT | Mod: S$GLB,,, | Performed by: SOCIAL WORKER

## 2018-03-19 SDOH — SOCIAL DETERMINANTS OF HEALTH (SDOH): PROBLEM RELATED TO PRIMARY SUPPORT GROUP, UNSPECIFIED: Z63.9

## 2018-03-20 NOTE — PROGRESS NOTES
ENCOUNTER DATE 3/19/18    SITE: [facility]    INSIGHT ORIENTED PSYCHOTHERAPY: 54579 45-50 min    Met with patient .    ENCOUNTER REASON/CHIEF COMPLAINT(symptom, problem, diagnosis, follow-up, reason for encounter):   The patient presents for follow up for coping skills to better manage her anxiety, depression and stress. pt. struggles with self regulation of her emotional states.     INTERVAL EVENTS: Pt was last seen 2 weeks ago for session. Pt presents alone for this session. Pt continues to reports have a challenging month since she was last seen for session. Pt also reports she continues to have regular contact with her friends and is getting to participate in Yoga, Pottery and having regular get together's with her friends.  Pt reports she is continuing to participate in her yoga class. Pt reports she continues to be very involved in her granddaughter's activities with school and now with softball. Pt reports her granddaughter's father's hearing is has been postponed and they are more hopeful as it may work out for him.  Pt also reports she and her  were able to work a new schedule for caring for their mother. Pt reports she did have a difficult conversation with her older sister who she states harbors resentments toward her from the past.  Pt continues to report her granddaughter's father is doing a lot more with her seems to be making all events etc.  Clinician continues to work with pt to reframe concerns in practical ways that can be better addressed to alleviate her concerns. Clinician continues to work with pt on utilizing cbt skills to better manage and tolerate distress and improve interpersonal relationship skills.  Pt reports to continue medication regimen as prescribed. Clinician continues to work with pt on self acceptance, being more realistic with her self and others and working on identifying her excessive guilt especially as it relates to her family e.g. Relationship with children.   Clinician continues to challenge pt to work on having more structure in her daily scheduled and to work on being mindful of how much negative thinking she is dwelling on rather engaging in positive activities & focus on the present. Pt continues to report she has been compliant with medications since her last session.     Mood: more anxious,  Affect:appropriate to circumstance, TP: circumstantial,tangential ,slight- rapid & pressured speech,  (+)racing thoughts, paranoid thinking, agitation, mood swings,(+) tearful episodes (+) worry,(+) obsessive & intrusive thoughts, ruminates on past events that tend to be negative with her adult children,Sleep: improved, No SI,insight/judgement: fair/improving.  Pt continues to present with automatic negative thoughts about her self continues to have insecurities, ego dystonic and continues to express extreme guilt over her lack of involvement with her mother.  Clinician continues to challenge these negative thoughts, focus on challenge statements, worked on mindfulness become of aware of her distortions that contribute to her feelings anxious and depressed. Pt continues to discuss stress mgmt coping skills, family dynamics. Pt continues to struggle with interpersonal relationships and family dynamics.      TREATMENT PLAN:     No changes in treatment plan from chart note of (date): 1/26/2012    Target Symptoms: Mood swings,depression, anxiety, irritability,anergiam anhedonia, affective lability, poor interpersonal relationships, co dependent bx's, guilt and racing thoughts, no motivation, frequent tearful episodes, cognitive distortions, paranoid & hopeless.    Therapeutic Intervention type:   Support  Behavior Modification  Medication Mgmt  Why chosen therapy is appropriate versus another modality:  Relevant to diagnosis:evidenced based.  Patient responds to this modality  Outcome monitoring methods:  Self-Report  Observation    RISK PARAMETERS:     Patient reports no suicidal  ideation.  Patient reports no homicidal ideation.  Patient reports no self injurious behavior.  Patient reports no violent behavior.    PATIENT'S RESPONSE TO INTERVENTION:   The patient's response to intervention is accepting.    PROGRESS TOWARD GOALS AND OTHER MENTAL STATUS CHANGES:   The patient's progress toward goals is poor.    DIAGNOSIS  Bipolar DO, most recent manic  (296.52)  HUMPHREY (300.02)  Personality NOS (301.89)  Family Circumstance NOS/Caring for an aging parent with dementia (V61.3)    GLOBAL ASSESSMENT OF FUNCTION SCALE:  The patient's GAF is 70    PLAN:   Pt. should start to attend individual therapy once a week.   Medication Management: Pt is currently being followed by Dr. Mack.   See physician notes.  Return to clinic in 2-4 weeks.

## 2018-04-09 ENCOUNTER — OFFICE VISIT (OUTPATIENT)
Dept: PSYCHIATRY | Facility: CLINIC | Age: 58
End: 2018-04-09
Payer: COMMERCIAL

## 2018-04-09 DIAGNOSIS — F32.A DEPRESSION, UNSPECIFIED DEPRESSION TYPE: ICD-10-CM

## 2018-04-09 DIAGNOSIS — Z65.8 INTERPERSONAL PROBLEM: ICD-10-CM

## 2018-04-09 DIAGNOSIS — F31.31 BIPOLAR AFFECTIVE DISORDER, CURRENTLY DEPRESSED, MILD: Primary | ICD-10-CM

## 2018-04-09 DIAGNOSIS — Z63.9 FAMILY PROBLEMS: ICD-10-CM

## 2018-04-09 DIAGNOSIS — F41.1 GAD (GENERALIZED ANXIETY DISORDER): ICD-10-CM

## 2018-04-09 PROCEDURE — 90834 PSYTX W PT 45 MINUTES: CPT | Mod: S$GLB,,, | Performed by: SOCIAL WORKER

## 2018-04-09 SDOH — SOCIAL DETERMINANTS OF HEALTH (SDOH): PROBLEM RELATED TO PRIMARY SUPPORT GROUP, UNSPECIFIED: Z63.9

## 2018-04-16 NOTE — PROGRESS NOTES
ENCOUNTER DATE 4/9/18    SITE: [facility]    INSIGHT ORIENTED PSYCHOTHERAPY: [id number] 45-50 min    Met with patient .    ENCOUNTER REASON/CHIEF COMPLAINT(symptom, problem, diagnosis, follow-up, reason for encounter):   The patient presents for follow up for coping skills to better manage her anxiety, depression and stress. pt. struggles with self regulation of her emotional states.     INTERVAL EVENTS: Pt was last seen 2 weeks ago for session. Pt discussed her feelings about her sister-in-law who is undergoing treatment for stage iv colan cancer. Pt reports she is having a hard time watching her sister-in-law go through her treatments.  Pt also reports she continues to have regular contact with her friends and is getting to participate in Yoga, Pottery and having regular get together's with her friends.  Pt reports she continues to be very involved in her granddaughter's activities with school and now with softball. Pt reports her granddaughter's father's hearing has been postponed. Pt reports she is happy to see her granddaughter's father is doing a lot more with her seems to be making all events etc. Pt reports the new arrangement for caring for her mother seems to be working out between pt and her siblings. Pt also reports she is planning on hosting a family gathering at home in 2 weeks with her pottery friends.  Pt reports she continues to worry about the future with her  and how she will manage to learn to handle bills and finances when eventually her 's eyesight will be completely gone. Pt discussed in detail her fears of technology and her fear of learning how to manage finances on her computer. Clinician explored how pt could take an intro course on how to use a computer, purchase a simple laptop to learn on.  Clinician continues to work with pt to reframe concerns in practical ways that can be better addressed to alleviate her concerns. Clinician continues to work with pt on utilizing cbt  skills to better manage and tolerate distress and improve interpersonal relationship skills.  Pt reports to continue medication regimen as prescribed.  Clinician continues to work with pt on self acceptance, being more realistic with her self and others and working on identifying her excessive guilt especially as it relates to her family e.g. Relationship with children.  Clinician continues to challenge pt to work on having more structure in her daily scheduled and to work on being mindful of how much negative thinking she is dwelling on rather engaging in positive activities & focus on the present. Pt continues to report she has been compliant with medications since her last session.     Mood:less- anxious,  Affect:appropriate to circumstance, TP: circumstantial,tangential ,slight- rapid & pressured speech,  (+)racing thoughts, paranoid thinking, agitation, mood swings,(+) tearful episodes (+) worry,(+) obsessive & intrusive thoughts, ruminates on past events that tend to be negative with her adult children,Sleep: improved, No SI,insight/judgement: fair/improving.  Pt continues to present with automatic negative thoughts about her self continues to have insecurities, ego dystonic and continues to express extreme guilt over her lack of involvement with her mother.  Clinician continues to challenge these negative thoughts, focus on challenge statements, worked on mindfulness become of aware of her distortions that contribute to her feelings anxious and depressed. Pt continues to discuss stress mgmt coping skills, family dynamics. Pt continues to struggle with interpersonal relationships and family dynamics.      TREATMENT PLAN:     No changes in treatment plan from chart note of (date): 1/26/2012    Target Symptoms: Mood swings,depression, anxiety, irritability,anergiam anhedonia, affective lability, poor interpersonal relationships, co dependent bx's, guilt and racing thoughts, no motivation, frequent tearful  episodes, cognitive distortions, paranoid & hopeless.    Therapeutic Intervention type:   Support  Behavior Modification  Medication Mgmt  Why chosen therapy is appropriate versus another modality:  Relevant to diagnosis:evidenced based.  Patient responds to this modality  Outcome monitoring methods:  Self-Report  Observation    RISK PARAMETERS:     Patient reports no suicidal ideation.  Patient reports no homicidal ideation.  Patient reports no self injurious behavior.  Patient reports no violent behavior.    PATIENT'S RESPONSE TO INTERVENTION:   The patient's response to intervention is accepting.    PROGRESS TOWARD GOALS AND OTHER MENTAL STATUS CHANGES:   The patient's progress toward goals is poor.    DIAGNOSIS  Bipolar DO, most recent manic  (296.52)  HUMPHREY (300.02)  Personality NOS (301.89)  Family Circumstance NOS/Caring for an aging parent with dementia (V61.3)    GLOBAL ASSESSMENT OF FUNCTION SCALE:  The patient's GAF is 70    PLAN:   Pt. should start to attend individual therapy once a week.   Medication Management: Pt is currently being followed by Dr. Mack.   See physician notes.  Return to clinic in 2-4 weeks.

## 2018-05-03 ENCOUNTER — OFFICE VISIT (OUTPATIENT)
Dept: FAMILY MEDICINE | Facility: CLINIC | Age: 58
End: 2018-05-03
Payer: COMMERCIAL

## 2018-05-03 ENCOUNTER — HOSPITAL ENCOUNTER (OUTPATIENT)
Dept: RADIOLOGY | Facility: HOSPITAL | Age: 58
Discharge: HOME | End: 2018-05-03
Attending: FAMILY MEDICINE
Payer: COMMERCIAL

## 2018-05-03 VITALS
HEIGHT: 64 IN | DIASTOLIC BLOOD PRESSURE: 64 MMHG | BODY MASS INDEX: 20.89 KG/M2 | TEMPERATURE: 98 F | WEIGHT: 122.38 LBS | RESPIRATION RATE: 16 BRPM | SYSTOLIC BLOOD PRESSURE: 90 MMHG

## 2018-05-03 DIAGNOSIS — R31.29 OTHER MICROSCOPIC HEMATURIA: ICD-10-CM

## 2018-05-03 DIAGNOSIS — R10.31 RLQ ABDOMINAL PAIN: ICD-10-CM

## 2018-05-03 DIAGNOSIS — F31.11 BIPOLAR AFFECTIVE DISORDER, CURRENTLY MANIC, MILD: ICD-10-CM

## 2018-05-03 DIAGNOSIS — R10.31 RLQ ABDOMINAL PAIN: Primary | ICD-10-CM

## 2018-05-03 DIAGNOSIS — Z87.442 HISTORY OF KIDNEY STONES: ICD-10-CM

## 2018-05-03 LAB
BILIRUB SERPL-MCNC: NEGATIVE MG/DL
BLOOD URINE, POC: 250
COLOR, POC UA: NORMAL
GLUCOSE UR QL STRIP: NORMAL
KETONES UR QL STRIP: NEGATIVE
LEUKOCYTE ESTERASE URINE, POC: NORMAL
NITRITE, POC UA: NEGATIVE
PH, POC UA: 6
PROTEIN, POC: NEGATIVE
SPECIFIC GRAVITY, POC UA: 1.01
UROBILINOGEN, POC UA: NORMAL

## 2018-05-03 PROCEDURE — 74018 RADEX ABDOMEN 1 VIEW: CPT | Mod: 26,,, | Performed by: RADIOLOGY

## 2018-05-03 PROCEDURE — 99999 PR PBB SHADOW E&M-EST. PATIENT-LVL IV: CPT | Mod: PBBFAC,,, | Performed by: FAMILY MEDICINE

## 2018-05-03 PROCEDURE — 81002 URINALYSIS NONAUTO W/O SCOPE: CPT | Mod: S$GLB,,, | Performed by: FAMILY MEDICINE

## 2018-05-03 PROCEDURE — 3008F BODY MASS INDEX DOCD: CPT | Mod: CPTII,S$GLB,, | Performed by: FAMILY MEDICINE

## 2018-05-03 PROCEDURE — 74018 RADEX ABDOMEN 1 VIEW: CPT | Mod: TC,FY,PO

## 2018-05-03 PROCEDURE — 99214 OFFICE O/P EST MOD 30 MIN: CPT | Mod: 25,S$GLB,, | Performed by: FAMILY MEDICINE

## 2018-05-03 PROCEDURE — 87086 URINE CULTURE/COLONY COUNT: CPT

## 2018-05-03 RX ORDER — TAMSULOSIN HYDROCHLORIDE 0.4 MG/1
0.4 CAPSULE ORAL DAILY
Qty: 7 CAPSULE | Refills: 1 | Status: SHIPPED | OUTPATIENT
Start: 2018-05-03 | End: 2019-07-10 | Stop reason: DRUGHIGH
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
## 2018-05-03 NOTE — PATIENT INSTRUCTIONS
Continue other medications    --------------------------  WATER BALANCE-  Your body systems work best with 64 ounces DAILY  (equal to 8 cups or a HALF A GALLON DAILY)   - to flush out impurities & cleanse our organs.   For every 8 ounces of caffeine you drink, ADD ANOTHER CUP of water to your daily water needs. (2 cups of coffee and one diet coke = you need 11 glasses of water a day). We were not made to drink sugary drinks  - not even artificial sweeteners.   You'll notice a difference in your brain function, energy level & overall wellness. Your liver & kidney filters will thank you too for keeping them properly cleansed  ---------------------------    This may be a kidney stone (with blood in urine & her history)    We also discussed the possibility of life threatening appendicitis. To the ER if symptoms acutely worsen.

## 2018-05-03 NOTE — PROGRESS NOTES
Please let pt know that abdominal  Xray is does not show anything worrisome, but it does not always show a kidney stone. Follow my treatment & to ER if symptoms worsen

## 2018-05-04 ENCOUNTER — TELEPHONE (OUTPATIENT)
Dept: FAMILY MEDICINE | Facility: CLINIC | Age: 58
End: 2018-05-04

## 2018-05-04 DIAGNOSIS — R31.1 BENIGN ESSENTIAL MICROSCOPIC HEMATURIA: Primary | ICD-10-CM

## 2018-05-04 LAB — BACTERIA UR CULT: NO GROWTH

## 2018-05-04 NOTE — TELEPHONE ENCOUNTER
----- Message from Jennifer Culver sent at 5/4/2018  4:36 PM CDT -----  Contact: pt [phone]  Pt is calling to follow up on a call that she put in earlier regarding a prescription that has sulphur in and she is allergic. Please advise.

## 2018-05-04 NOTE — TELEPHONE ENCOUNTER
Order in for kidney US.     Tamsulosin is the preferred medications for kidney passage. Drink twice the amount of fluids she is drinking now     To the ER if symptoms worse over the weekend. Possible  Appendicitis is best evaluated in the ER

## 2018-05-04 NOTE — TELEPHONE ENCOUNTER
Spoke at length with pt.  Spoke with NP Ovidio this is an optional medication which will help pass a kidney stone.  Pt prefers not to take Tamsulosin.  She will increase water intake as directed.  Advised to ER if symptoms worsen.    Aura was able to schedule US tomorrow at [facility].

## 2018-05-04 NOTE — TELEPHONE ENCOUNTER
----- Message from Genoveva Earl sent at 5/4/2018  9:17 AM CDT -----  Contact: call pt at  [phone]  Patient is calling in regards to the tamsulosin (FLOMAX) 0.4 mg Cp24 medication she was given on yesterday. Was told by pharmicist this contains sulfur which she is allergic to,  Can she be given a new medication.     How long will she be taking this medication, and what if it is a kidney stone, how to proceed with this.     What test will let her know what problem is  Kidney stone or appendix and how long to wait before tests are ordered for her     Should she schedule an appointment with a doctor for the appendix and which type of doctor is that

## 2018-05-05 ENCOUNTER — HOSPITAL ENCOUNTER (OUTPATIENT)
Dept: RADIOLOGY | Facility: OTHER | Age: 58
Discharge: HOME | End: 2018-05-05
Attending: FAMILY MEDICINE
Payer: COMMERCIAL

## 2018-05-05 DIAGNOSIS — R31.1 BENIGN ESSENTIAL MICROSCOPIC HEMATURIA: ICD-10-CM

## 2018-05-05 PROCEDURE — 76770 US EXAM ABDO BACK WALL COMP: CPT | Mod: 26,,, | Performed by: RADIOLOGY

## 2018-05-05 PROCEDURE — 76770 US EXAM ABDO BACK WALL COMP: CPT | Mod: TC

## 2018-05-07 ENCOUNTER — TELEPHONE (OUTPATIENT)
Dept: FAMILY MEDICINE | Facility: CLINIC | Age: 58
End: 2018-05-07

## 2018-05-07 NOTE — PROGRESS NOTES
Please let pt know that    URINE did NOT grow an infection.     Drink lots of liquids - until your urine is clear-- to flush & cleanse the kidneys. Avoid caffeine, chocolate, alcohol, and other irritants.     Please let me know if your symptoms persist.

## 2018-05-07 NOTE — PROGRESS NOTES
Please inform patient that kidney ultrasound shows no stone or blockage.  Let me know  if symptoms worsen or persist

## 2018-05-07 NOTE — TELEPHONE ENCOUNTER
----- Message from Ronna Dalal MD sent at 5/7/2018  2:53 PM CDT -----  Please let pt know that    URINE did NOT grow an infection.     Drink lots of liquids - until your urine is clear-- to flush & cleanse the kidneys. Avoid caffeine, chocolate, alcohol, and other irritants.     Please let me know if your symptoms persist.

## 2018-05-07 NOTE — TELEPHONE ENCOUNTER
----- Message from Ronna Dalal MD sent at 5/7/2018  3:08 PM CDT -----  Please inform patient that kidney ultrasound shows no stone or blockage.  Let me know  if symptoms worsen or persist

## 2018-05-07 NOTE — TELEPHONE ENCOUNTER
Pt advised that US showed NO stone or blockage.  Pt reports taht she started feeling better Saturday evening.  No pain on Sunday or today.

## 2018-06-07 ENCOUNTER — TELEPHONE (OUTPATIENT)
Dept: FAMILY MEDICINE | Facility: CLINIC | Age: 58
End: 2018-06-07

## 2018-06-07 NOTE — TELEPHONE ENCOUNTER
Voice mail msg left for pt that I am cancelling her 6/20 lab appt.  Just bring lab results from Dr. Mack to appt.  If having urinary symptoms, I can schedule nurse appt for urine dipstick.  Call nurse to schedule.

## 2018-06-07 NOTE — TELEPHONE ENCOUNTER
----- Message from Dejuan Posadas sent at 6/7/2018 11:01 AM CDT -----  Doctor appointment and lab have been scheduled.  Please link lab orders to the lab appointment.  Date of doctor appointment:  6/27  Physical or EP:  Physical  Date of lab appointment:  6/20  Comments: Pt will like to test her Urine.

## 2018-06-26 NOTE — PROGRESS NOTES
ENCOUNTER DATE 1/31/18    SITE: [facility]    INSIGHT ORIENTED PSYCHOTHERAPY: [id number] 45-50 min    Met with patient .    ENCOUNTER REASON/CHIEF COMPLAINT(symptom, problem, diagnosis, follow-up, reason for encounter):   The patient presents for follow up for coping skills to better manage her anxiety, depression and stress. pt. struggles with self regulation of her emotional states.     INTERVAL EVENTS: Pt was last seen 2 months ago for session. Pt presents alone for session. Pt reports she managed to get through the holidays and is still involved in taking care of her granddaughter. Pt reports she is also helping her siblings  2 days a week in caring for her mother who has alzheimer's. Pt also reports she continues to have regular contact with her friends and is getting to participate in Yoga, Pottery and having regular get together's with her friends.   Pt continues to report she has also been busy with the renovations of the new [employer] building she and her  recently acquired and reports this has continued to help redirect her  focus. Pt denies any si. pt continues to worry over future events regarding the status of their health particularly her 's eye sight as there has been further detertoriation of his eye sight-eventual blindness has been the prognosis Clinician continues to work with pt to reframe concerns in practical ways that can be better addressed to alleviate her concerns. Clinician continues to work with pt on utilizing cbt skills to better manage and tolerate distress and improve interpersonal relationship skills.  Pt reports to continue medication regimen as prescribed.  Clinician continues to work with pt on self acceptance, being more realistic with her self and others and working on identifying her excessive guilt especially as it relates to her family e.g. Relationship with children.  Clinician continues to challenge pt to work on having more structure in her daily scheduled and to  work on being mindful of how much negative thinking she is dwelling on rather engaging in positive activities & focus on the present. Pt continues to report she has been compliant with medications since her last session.     Mood: depressed/anxious,  Affect:appropriate to circumstance, TP: circumstantial,tangential , No rapid & pressured speech,  racing thoughts, paranoid thinking, agitation, mood swings, tearful episodes (+) worry,(+) obsessive & intrusive thoughts, ruminates on past events that tend to be negative with her adult children,Sleep: improved, No SI,insight/judgement: fair/improving.  Pt continues to present with automatic negative thoughts about her self continues to have insecurities, ego dystonic and continues to express extreme guilt over her lack of involvement with her mother.  Clinician continues to challenge these negative thoughts, focus on challenge statements, worked on mindfulness become of aware of her distortions that contribute to her feelings anxious and depressed. Pt continues to discuss stress mgmt coping skills, family dynamics. Pt continues to struggle with interpersonal relationships and family dynamics.      TREATMENT PLAN:     No changes in treatment plan from chart note of (date): 1/26/2012    Target Symptoms: Mood swings,depression, anxiety, irritability,anergiam anhedonia, affective lability, poor interpersonal relationships, co dependent bx's, guilt and racing thoughts, no motivation, frequent tearful episodes, cognitive distortions, paranoid & hopeless.    Therapeutic Intervention type:   Support  Behavior Modification  Medication Mgmt  Why chosen therapy is appropriate versus another modality:  Relevant to diagnosis:evidenced based.  Patient responds to this modality  Outcome monitoring methods:  Self-Report  Observation    RISK PARAMETERS:     Patient reports no suicidal ideation.  Patient reports no homicidal ideation.  Patient reports no self injurious behavior.  Patient  reports no violent behavior.    PATIENT'S RESPONSE TO INTERVENTION:   The patient's response to intervention is accepting.    PROGRESS TOWARD GOALS AND OTHER MENTAL STATUS CHANGES:   The patient's progress toward goals is poor.    DIAGNOSIS  Bipolar DO, most recent manic  (296.52)  HUMPHREY (300.02)  Personality NOS (301.89)  Family Circumstance NOS/Caring for an aging parent with dementia (V61.3)    GLOBAL ASSESSMENT OF FUNCTION SCALE:  The patient's GAF is 70    PLAN:   Pt. should start to attend individual therapy once a week.   Medication Management: Pt is currently being followed by Dr. Mack.   See physician notes.  Return to clinic in 2-4 weeks.

## 2018-06-27 ENCOUNTER — OFFICE VISIT (OUTPATIENT)
Dept: FAMILY MEDICINE | Facility: CLINIC | Age: 58
End: 2018-06-27
Payer: COMMERCIAL

## 2018-06-27 VITALS
WEIGHT: 122.13 LBS | DIASTOLIC BLOOD PRESSURE: 60 MMHG | BODY MASS INDEX: 21.64 KG/M2 | HEIGHT: 63 IN | SYSTOLIC BLOOD PRESSURE: 82 MMHG | TEMPERATURE: 98 F | RESPIRATION RATE: 20 BRPM

## 2018-06-27 DIAGNOSIS — Z00.00 ROUTINE GENERAL MEDICAL EXAMINATION AT A HEALTH CARE FACILITY: Primary | ICD-10-CM

## 2018-06-27 DIAGNOSIS — Z12.31 SCREENING MAMMOGRAM, ENCOUNTER FOR: ICD-10-CM

## 2018-06-27 DIAGNOSIS — Z12.4 SCREENING FOR CERVICAL CANCER: ICD-10-CM

## 2018-06-27 DIAGNOSIS — E78.2 MIXED HYPERLIPIDEMIA: ICD-10-CM

## 2018-06-27 DIAGNOSIS — F31.11 BIPOLAR AFFECTIVE DISORDER, CURRENTLY MANIC, MILD: ICD-10-CM

## 2018-06-27 PROBLEM — R74.8 ELEVATED LIVER ENZYMES: Status: ACTIVE | Noted: 2018-06-27

## 2018-06-27 PROBLEM — R74.8 ELEVATED LIVER ENZYMES: Status: RESOLVED | Noted: 2018-06-27 | Resolved: 2018-06-27

## 2018-06-27 PROCEDURE — 87624 HPV HI-RISK TYP POOLED RSLT: CPT

## 2018-06-27 PROCEDURE — 99396 PREV VISIT EST AGE 40-64: CPT | Mod: S$GLB,,, | Performed by: FAMILY MEDICINE

## 2018-06-27 PROCEDURE — 88141 CYTOPATH C/V INTERPRET: CPT | Mod: ,,, | Performed by: PATHOLOGY

## 2018-06-27 PROCEDURE — 88175 CYTOPATH C/V AUTO FLUID REDO: CPT | Performed by: PATHOLOGY

## 2018-06-27 PROCEDURE — 99999 PR PBB SHADOW E&M-EST. PATIENT-LVL IV: CPT | Mod: PBBFAC,,, | Performed by: FAMILY MEDICINE

## 2018-06-27 NOTE — PROGRESS NOTES
Subjective:      Patient ID: Kimberly Jo is a 58 y.o. female.    Chief Complaint: Annual Exam    Here today for general exam.     Due pap smear - last June 2015    She follows with psychiatrist for medications listed below. She is stable    Denies any chest pain, shortness of breath, nausea vomiting constipation diarrhea, blood in stool, heartburn    [facility] from pyschiatrist Dr Mack Feb 2018 - [facility]    Total cholesterol 261, HDL 85, Triglycerides 91,     AST 20, ALT 19    Current Outpatient Prescriptions on File Prior to Visit   Medication Sig    biotin 2,500 mcg Tab Take 50,000 mcg by mouth once daily.    cholecalciferol, vitamin D3, (VITAMIN D3) 2,000 unit Tab Take by mouth once daily.    diazePAM (VALIUM) 5 MG tablet Take 5 mg by mouth every 6 (six) hours as needed.     lamotrigine (LAMICTAL) 200 MG tablet 2 (two) times daily.     quetiapine (SEROQUEL XR) 400 MG Tb24 once daily at 6am. 450mg daily    tamsulosin (FLOMAX) 0.4 mg Cp24 Take 1 capsule (0.4 mg total) by mouth once daily.     No current facility-administered medications on file prior to visit.      Past Medical History:   Diagnosis Date    Depression     Dr Lourdes Mack & therapist Jaziel    History of herpes zoster 06/21/2017    tx GYN    HLD (hyperlipidemia) 2/2/2016    Mixed hyperlipidemia 2/2/2016    Moderate episode of recurrent major depressive disorder 8/16/2016    Slow transit constipation 2/2/2016    CS 52 yo    SVT (supraventricular tachycardia)     2015 ablated with Adenosine EMT (too much caffeine), cardiologist rec bblocker if it recurrs    Vitamin D deficiency     2015 OTC suppl     No past surgical history on file.  Social History     Social History Narrative    Homemaker, enjoys yoga,  Jimbo with glaucoma, 2 children, nonsmoker, ETOH socially, GYN here & [place], nml pap 6/2015, normal colonoscopy 52 yo per pt with doctor on [place]     Family History   Problem Relation Age of Onset    Melanoma Father   "   Cancer Father     Heart disease Brother 28        after injury hit head    Hypertension Mother     Alzheimer's disease Mother     Breast cancer Neg Hx     Colon cancer Neg Hx     Ovarian cancer Neg Hx      Vitals:    06/27/18 0952   BP: (!) 82/60   Resp: 20   Temp: 98 °F (36.7 °C)   Weight: 55.4 kg (122 lb 2.2 oz)   Height: 5' 3" (1.6 m)     Objective:   Physical Exam   Constitutional: She is oriented to person, place, and time. She appears well-developed and well-nourished.   HENT:   Head: Normocephalic and atraumatic.   Right Ear: External ear normal.   Left Ear: External ear normal.   Nose: Nose normal.   Mouth/Throat: Oropharynx is clear and moist.   Eyes: EOM are normal. Pupils are equal, round, and reactive to light.   Neck: Neck supple. No thyromegaly present.   Cardiovascular: Normal rate, regular rhythm, normal heart sounds and intact distal pulses.    No murmur heard.  Pulmonary/Chest: Effort normal and breath sounds normal. She has no wheezes. She has no rales. Right breast exhibits no mass, no nipple discharge, no skin change and no tenderness. Left breast exhibits no mass, no nipple discharge, no skin change and no tenderness.   Abdominal: Soft. Bowel sounds are normal. She exhibits no distension and no mass. There is no hepatosplenomegaly. There is no tenderness. There is no rebound and no guarding.   Genitourinary: Vagina normal and uterus normal. No breast tenderness. Pelvic exam was performed with patient supine. Right adnexum displays no mass and no tenderness. Left adnexum displays no mass and no tenderness. No vaginal discharge found.   Musculoskeletal: She exhibits no edema.   Lymphadenopathy:     She has no cervical adenopathy.     She has no axillary adenopathy.        Right: No supraclavicular adenopathy present.        Left: No supraclavicular adenopathy present.   Neurological: She is alert and oriented to person, place, and time.   Skin: Skin is warm and dry. No rash noted. "   Psychiatric: She has a normal mood and affect. Her behavior is normal.     Assessment:     1. Routine general medical examination at a health care facility    2. Screening mammogram, encounter for    3. Bipolar affective disorder, currently manic, mild    4. Mixed hyperlipidemia    5. Screening for cervical cancer      Plan:     Orders Placed This Encounter    HPV High Risk Genotypes, PCR    Mammo Digital Screening Bilat with CAD    Liquid-based pap smear, screening       Patient Instructions   Normal mammogram 2016    Follow with psychiatrist    I'd like to advise you on current CANCER SCREENING recommendations:  ~~~~~~~~~~~~~~~~~~~~~~~~~~~~~~~~~~~~~~~~~~~~~~~~~~~~~~~~~~~~~   If all previous PAP SMEARS have been normal, no current problems, and no family history of female cancers, it is recommended to have Pap smear every 3 years (or after 31 yo, every 5 years with HPV co-testing).   MAMMOGRAM  every 1-2 years, from 50 - 74 years old. We can discuss your risk at 40 & determine whether to get mammogram sooner  Of course, I can perform this sooner if there is family history of cancer, or if you have problems or questions    ====================    Increase FIBER INTAKE - your body is happiest with FIVE FRESH COLORS of fruits and  vegetables DAILY    With respect to FIBER intake, you should have 5-10 grams of viscous soluble fiber daily. This  Includes fruit (bananas, apples, pears, blackberries, citrus, peaches, plums, nectarines), whole grains (oatmeal, oat bran, all-bran cereal, granola, shredded wheat, wheat germ, teddy barley, brown rice), legumes (northern/camacho/navy/lima/kidney/black beans, peas, lentils), seeds (psyllium), and vegetables (carrots, broccoli, brussels sprouts, artichokes).      ---------------------------------------------------------------------------------------------------------    GET MOVING-- Walking & being active (20 minutes most days of the week).  [email or web address]  ================      RECOMMENDATIONS FOR FEMALES    Your #1 MEDICINE is DAILY EXERCISE - 15-20 minutes of huffing & puffing EVERY DAY.     Prevent the #1 cause of death- cardiovascular disease (HEART ATTACK & STROKE) by checking for normal blood pressure, cholesterol, sugars, & by not smoking.     VACCINES: Yearly FLU shot, ONE PNEUMONIA shot after 65,  SHINGLES shot after 60    Screening colonoscopy at AGE  50 & every 10 years to check for COLON CANCER,  one of the most common & preventable cancers (Or FIT kit yearly) Repeat in 3 years if POLYP found     I recommend  high fiber (5 fresh fruits or vegetables daily), low fat diet and aerobic  exercise (huffing/ puffing/ sweating for 20 min straight at least 4 days a week)    Follow up yearly with mammogram, fasting lipids, CMP, CBC prior.   ==============================================================

## 2018-06-27 NOTE — PATIENT INSTRUCTIONS
Normal mammogram 2016    Follow with psychiatrist    I'd like to advise you on current CANCER SCREENING recommendations:  ~~~~~~~~~~~~~~~~~~~~~~~~~~~~~~~~~~~~~~~~~~~~~~~~~~~~~~~~~~~~~   If all previous PAP SMEARS have been normal, no current problems, and no family history of female cancers, it is recommended to have Pap smear every 3 years (or after 29 yo, every 5 years with HPV co-testing).   MAMMOGRAM  every 1-2 years, from 50 - 74 years old. We can discuss your risk at 40 & determine whether to get mammogram sooner  Of course, I can perform this sooner if there is family history of cancer, or if you have problems or questions    ====================    Increase FIBER INTAKE - your body is happiest with FIVE FRESH COLORS of fruits and  vegetables DAILY    With respect to FIBER intake, you should have 5-10 grams of viscous soluble fiber daily. This  Includes fruit (bananas, apples, pears, blackberries, citrus, peaches, plums, nectarines), whole grains (oatmeal, oat bran, all-bran cereal, granola, shredded wheat, wheat germ, teddy barley, brown rice), legumes (northern/camacho/navy/lima/kidney/black beans, peas, lentils), seeds (psyllium), and vegetables (carrots, broccoli, brussels sprouts, artichokes).      ---------------------------------------------------------------------------------------------------------    GET MOVING-- Walking & being active (20 minutes most days of the week). [employer]  ================      RECOMMENDATIONS FOR FEMALES    Your #1 MEDICINE is DAILY EXERCISE - 15-20 minutes of huffing & puffing EVERY DAY.     Prevent the #1 cause of death- cardiovascular disease (HEART ATTACK & STROKE) by checking for normal blood pressure, cholesterol, sugars, & by not smoking.     VACCINES: Yearly FLU shot, ONE PNEUMONIA shot after 65,  SHINGLES shot after 60    Screening colonoscopy at AGE  50 & every 10 years to check for COLON CANCER,  one of the most common & preventable cancers (Or FIT kit  yearly) Repeat in 3 years if POLYP found     I recommend  high fiber (5 fresh fruits or vegetables daily), low fat diet and aerobic  exercise (huffing/ puffing/ sweating for 20 min straight at least 4 days a week)    Follow up yearly with mammogram, fasting lipids, CMP, CBC prior.   ==============================================================

## 2018-07-03 LAB
HPV HR 12 DNA CVX QL NAA+PROBE: NEGATIVE
HPV16 AG SPEC QL: NEGATIVE
HPV18 DNA SPEC QL NAA+PROBE: NEGATIVE

## 2018-07-09 ENCOUNTER — OFFICE VISIT (OUTPATIENT)
Dept: PSYCHIATRY | Facility: CLINIC | Age: 58
End: 2018-07-09
Payer: COMMERCIAL

## 2018-07-09 DIAGNOSIS — F41.1 GAD (GENERALIZED ANXIETY DISORDER): Primary | ICD-10-CM

## 2018-07-09 DIAGNOSIS — F31.11 BIPOLAR AFFECTIVE DISORDER, CURRENTLY MANIC, MILD: ICD-10-CM

## 2018-07-09 DIAGNOSIS — Z65.8 INTERPERSONAL PROBLEM: ICD-10-CM

## 2018-07-09 DIAGNOSIS — Z63.9 FAMILY PROBLEMS: ICD-10-CM

## 2018-07-09 PROCEDURE — 90834 PSYTX W PT 45 MINUTES: CPT | Mod: S$GLB,,, | Performed by: SOCIAL WORKER

## 2018-07-09 SDOH — SOCIAL DETERMINANTS OF HEALTH (SDOH): PROBLEM RELATED TO PRIMARY SUPPORT GROUP, UNSPECIFIED: Z63.9

## 2018-07-13 ENCOUNTER — HOSPITAL ENCOUNTER (OUTPATIENT)
Dept: RADIOLOGY | Facility: HOSPITAL | Age: 58
Discharge: HOME | End: 2018-07-13
Attending: FAMILY MEDICINE
Payer: COMMERCIAL

## 2018-07-13 DIAGNOSIS — Z12.31 SCREENING MAMMOGRAM, ENCOUNTER FOR: ICD-10-CM

## 2018-07-13 PROCEDURE — 77063 BREAST TOMOSYNTHESIS BI: CPT | Mod: TC,PO

## 2018-07-13 PROCEDURE — 77067 SCR MAMMO BI INCL CAD: CPT | Mod: 26,,, | Performed by: RADIOLOGY

## 2018-07-13 PROCEDURE — 77063 BREAST TOMOSYNTHESIS BI: CPT | Mod: 26,,, | Performed by: RADIOLOGY

## 2018-07-16 NOTE — PROGRESS NOTES
ENCOUNTER DATE: 7/09/18    SITE: [facility]    INSIGHT ORIENTED PSYCHOTHERAPY: 28081 45-50 min    Met with patient .    ENCOUNTER REASON/CHIEF COMPLAINT(symptom, problem, diagnosis, follow-up, reason for encounter):   The patient presents for follow up for coping skills to better manage her anxiety, depression and stress. pt. struggles with self regulation of her emotional states.     INTERVAL EVENTS: Pt was last seen 3 months ago for session. Pt continues to discuss her feelings about her sister-in-law who is undergoing treatment for stage iv colan cancer. Pt continues to report she is having a hard time watching her sister-in-law go through her treatments.  Pt also reports she continues to have regular contact with her friends and is getting to participate in Yoga, Pottery and having regular get together's with her friends.  Pt reports she continues to be very involved in her granddaughter's activities with school and now with softball. Pt reports her granddaughter's father's hearing has been postponed. Pt reports she is happy to see her granddaughter's father is doing a lot more with her seems to be making all events etc. Pt reports the new arrangement for caring for her mother seems to be working out between pt and her siblings.   Pt reports she continues to worry about the future with her  and how she will manage to learn to handle bills and finances when eventually her 's eyesight will be completely gone. Pt discussed in detail her fears of technology and her fear of learning how to manage finances on her computer. Clinician explored how pt could take an intro course on how to use a computer, purchase a simple laptop to learn on.  Clinician continues to work with pt to reframe concerns in practical ways that can be better addressed to alleviate her concerns. Clinician continues to work with pt on utilizing cbt skills to better manage and tolerate distress and improve interpersonal relationship  skills.  Pt reports to continue medication regimen as prescribed.  Clinician continues to work with pt on self acceptance, being more realistic with her self and others and working on identifying her excessive guilt especially as it relates to her family e.g. Relationship with children.  Clinician continues to challenge pt to work on having more structure in her daily scheduled and to work on being mindful of how much negative thinking she is dwelling on rather engaging in positive activities & focus on the present. Pt continues to report she has been compliant with medications since her last session.     Mood:less- anxious,  Affect:appropriate to circumstance, TP: circumstantial,tangential ,slight- rapid & pressured speech,  (+)racing thoughts, paranoid thinking, agitation, mood swings,(+) tearful episodes (+) worry,(+) obsessive & intrusive thoughts, ruminates on past events that tend to be negative with her adult children,Sleep: improved, No SI,insight/judgement: fair/improving.  Pt continues to present with automatic negative thoughts about her self continues to have insecurities, ego dystonic and continues to express extreme guilt over her lack of involvement with her mother.  Clinician continues to challenge these negative thoughts, focus on challenge statements, worked on mindfulness become of aware of her distortions that contribute to her feelings anxious and depressed. Pt continues to discuss stress mgmt coping skills, family dynamics. Pt continues to struggle with interpersonal relationships and family dynamics.      TREATMENT PLAN:     No changes in treatment plan from chart note of (date): 1/26/2012    Target Symptoms: Mood swings,depression, anxiety, irritability,anergiam anhedonia, affective lability, poor interpersonal relationships, co dependent bx's, guilt and racing thoughts, no motivation, frequent tearful episodes, cognitive distortions, paranoid & hopeless.    Therapeutic Intervention type:    Support  Behavior Modification  Medication Mgmt  Why chosen therapy is appropriate versus another modality:  Relevant to diagnosis:evidenced based.  Patient responds to this modality  Outcome monitoring methods:  Self-Report  Observation    RISK PARAMETERS:     Patient reports no suicidal ideation.  Patient reports no homicidal ideation.  Patient reports no self injurious behavior.  Patient reports no violent behavior.    PATIENT'S RESPONSE TO INTERVENTION:   The patient's response to intervention is accepting.    PROGRESS TOWARD GOALS AND OTHER MENTAL STATUS CHANGES:   The patient's progress toward goals is poor.    DIAGNOSIS  Bipolar DO, most recent manic  (296.52)  HUMPHREY (300.02)  Personality NOS (301.89)  Family Circumstance NOS/Caring for an aging parent with dementia (V61.3)    GLOBAL ASSESSMENT OF FUNCTION SCALE:  The patient's GAF is 70    PLAN:   Pt. should start to attend individual therapy once a week.   Medication Management: Pt is currently being followed by Dr. Mack.   See physician notes.  Return to clinic in 2-4 weeks.

## 2018-07-23 ENCOUNTER — OFFICE VISIT (OUTPATIENT)
Dept: PSYCHIATRY | Facility: CLINIC | Age: 58
End: 2018-07-23
Payer: COMMERCIAL

## 2018-07-23 DIAGNOSIS — F33.1 MODERATE EPISODE OF RECURRENT MAJOR DEPRESSIVE DISORDER: ICD-10-CM

## 2018-07-23 DIAGNOSIS — Z65.8 INTERPERSONAL PROBLEM: ICD-10-CM

## 2018-07-23 DIAGNOSIS — F31.11 BIPOLAR AFFECTIVE DISORDER, CURRENTLY MANIC, MILD: Primary | ICD-10-CM

## 2018-07-23 DIAGNOSIS — Z63.9 FAMILY PROBLEMS: ICD-10-CM

## 2018-07-23 DIAGNOSIS — F41.1 GAD (GENERALIZED ANXIETY DISORDER): ICD-10-CM

## 2018-07-23 PROCEDURE — 90834 PR PSYCHOTHERAPY W/PATIENT, 45 MIN: ICD-10-PCS | Mod: S$GLB,,, | Performed by: SOCIAL WORKER

## 2018-07-23 PROCEDURE — 90834 PSYTX W PT 45 MINUTES: CPT | Mod: S$GLB,,, | Performed by: SOCIAL WORKER

## 2018-07-23 SDOH — SOCIAL DETERMINANTS OF HEALTH (SDOH): PROBLEM RELATED TO PRIMARY SUPPORT GROUP, UNSPECIFIED: Z63.9

## 2018-08-13 ENCOUNTER — OFFICE VISIT (OUTPATIENT)
Dept: PSYCHIATRY | Facility: CLINIC | Age: 58
End: 2018-08-13
Payer: COMMERCIAL

## 2018-08-13 DIAGNOSIS — Z63.9 FAMILY PROBLEMS: ICD-10-CM

## 2018-08-13 DIAGNOSIS — F41.1 GAD (GENERALIZED ANXIETY DISORDER): ICD-10-CM

## 2018-08-13 DIAGNOSIS — Z65.8 INTERPERSONAL PROBLEM: ICD-10-CM

## 2018-08-13 DIAGNOSIS — F31.31 BIPOLAR AFFECTIVE DISORDER, CURRENTLY DEPRESSED, MILD: Primary | ICD-10-CM

## 2018-08-13 DIAGNOSIS — F33.1 MODERATE EPISODE OF RECURRENT MAJOR DEPRESSIVE DISORDER: ICD-10-CM

## 2018-08-13 PROCEDURE — 90834 PSYTX W PT 45 MINUTES: CPT | Mod: S$GLB,,, | Performed by: SOCIAL WORKER

## 2018-08-13 SDOH — SOCIAL DETERMINANTS OF HEALTH (SDOH): PROBLEM RELATED TO PRIMARY SUPPORT GROUP, UNSPECIFIED: Z63.9

## 2018-08-27 ENCOUNTER — OFFICE VISIT (OUTPATIENT)
Dept: PSYCHIATRY | Facility: CLINIC | Age: 58
End: 2018-08-27
Payer: COMMERCIAL

## 2018-08-27 DIAGNOSIS — F41.1 GAD (GENERALIZED ANXIETY DISORDER): ICD-10-CM

## 2018-08-27 DIAGNOSIS — F31.31 BIPOLAR AFFECTIVE DISORDER, CURRENTLY DEPRESSED, MILD: Primary | ICD-10-CM

## 2018-08-27 DIAGNOSIS — Z65.8 INTERPERSONAL PROBLEM: ICD-10-CM

## 2018-08-27 DIAGNOSIS — Z63.9 FAMILY PROBLEMS: ICD-10-CM

## 2018-08-27 PROCEDURE — 90834 PSYTX W PT 45 MINUTES: CPT | Mod: S$GLB,,, | Performed by: SOCIAL WORKER

## 2018-08-27 SDOH — SOCIAL DETERMINANTS OF HEALTH (SDOH): PROBLEM RELATED TO PRIMARY SUPPORT GROUP, UNSPECIFIED: Z63.9

## 2018-08-28 NOTE — PROGRESS NOTES
ENCOUNTER DATE: 18    SITE: [facility]    INSIGHT ORIENTED PSYCHOTHERAPY: 92118 45-50 min    Met with patient .    ENCOUNTER REASON/CHIEF COMPLAINT(symptom, problem, diagnosis, follow-up, reason for encounter):   The patient presents for follow up for coping skills to better manage her anxiety, depression and stress. pt. struggles with self regulation of her emotional states.     INTERVAL EVENTS: Pt was last seen 1 month ago for session. Pt discussed a recent death on her 's side of the family. Pt's brother-in-law recently  and pt had to drive her sister-in-law over  To see the brother-in-law right before he . Pt reports she and her  is estranged from this side of the family bc they are homophobic and do not welcome her fowler son to their family events. Pt put aside her concerns to bring her other sister-in-law to all events involving this death. Pt continues to discuss her feelings about her sister-in-law who is undergoing treatment for stage iv colan cancer. Pt continues to report she is having a hard time watching her sister-in-law go through her treatments.  Pt also reports she continues to have regular contact with her friends and is getting to participate in Yoga, Pottery and having regular get together's with her friends.  Pt reports she continues to be very involved in her granddaughter's activities with school and now with softball. Pt reports her granddaughter's father's hearing has been postponed. Pt reports she is happy to see her granddaughter's father is doing a lot more with her seems to be making all events etc. Pt reports the arrangement for caring for her mother seems to be working out between pt and her siblings.   Pt reports she continues to worry about the future with her  and how she will manage to learn to handle bills and finances when eventually her 's eyesight will be completely gone. Pt discussed in detail her fears of technology and her fear of  learning how to manage finances on her computer. Clinician explored how pt could take an intro course on how to use a computer, purchase a simple laptop to learn on.  Clinician continues to work with pt to reframe concerns in practical ways that can be better addressed to alleviate her concerns. Clinician continues to work with pt on utilizing cbt skills to better manage and tolerate distress and improve interpersonal relationship skills.  Pt reports to continue medication regimen as prescribed.  Clinician continues to work with pt on self acceptance, being more realistic with her self and others and working on identifying her excessive guilt especially as it relates to her family e.g. Relationship with children.  Clinician continues to challenge pt to work on having more structure in her daily scheduled and to work on being mindful of how much negative thinking she is dwelling on rather engaging in positive activities & focus on the present. Pt continues to report she has been compliant with medications since her last session.     Mood:less- anxious,  Affect:appropriate to circumstance, TP: circumstantial,tangential ,slight- rapid & pressured speech,  (+)racing thoughts, paranoid thinking, agitation, mood swings,(+) tearful episodes (+) worry,(+) obsessive & intrusive thoughts, ruminates on past events that tend to be negative with her adult children,Sleep: improved, No SI,insight/judgement: fair/improving.  Pt continues to present with automatic negative thoughts about her self continues to have insecurities, ego dystonic and continues to express extreme guilt over her lack of involvement with her mother.  Clinician continues to challenge these negative thoughts, focus on challenge statements, worked on mindfulness become of aware of her distortions that contribute to her feelings anxious and depressed. Pt continues to discuss stress mgmt coping skills, family dynamics. Pt continues to struggle with interpersonal  relationships and family dynamics.      TREATMENT PLAN:     No changes in treatment plan from chart note of (date): 1/26/2012    Target Symptoms: Mood swings,depression, anxiety, irritability,anergiam anhedonia, affective lability, poor interpersonal relationships, co dependent bx's, guilt and racing thoughts, no motivation, frequent tearful episodes, cognitive distortions, paranoid & hopeless.    Therapeutic Intervention type:   Support  Behavior Modification  Medication Mgmt  Why chosen therapy is appropriate versus another modality:  Relevant to diagnosis:evidenced based.  Patient responds to this modality  Outcome monitoring methods:  Self-Report  Observation    RISK PARAMETERS:     Patient reports no suicidal ideation.  Patient reports no homicidal ideation.  Patient reports no self injurious behavior.  Patient reports no violent behavior.    PATIENT'S RESPONSE TO INTERVENTION:   The patient's response to intervention is accepting.    PROGRESS TOWARD GOALS AND OTHER MENTAL STATUS CHANGES:   The patient's progress toward goals is poor.    DIAGNOSIS  Bipolar DO, most recent manic  (296.52)  HUMPHREY (300.02)  Personality NOS (301.89)  Family Circumstance NOS/Caring for an aging parent with dementia (V61.3)    GLOBAL ASSESSMENT OF FUNCTION SCALE:  The patient's GAF is 70    PLAN:   Pt. should start to attend individual therapy once a week.   Medication Management: Pt is currently being followed by Dr. Mack.   See physician notes.  Return to clinic in 2-4 weeks.

## 2018-09-11 NOTE — PROGRESS NOTES
ENCOUNTER DATE: 8/13/18    SITE: [facility]    INSIGHT ORIENTED PSYCHOTHERAPY: 27653 45-50 min    Met with patient .    ENCOUNTER REASON/CHIEF COMPLAINT(symptom, problem, diagnosis, follow-up, reason for encounter):   The patient presents for follow up for coping skills to better manage her anxiety, depression and stress. pt. struggles with self regulation of her emotional states.     INTERVAL EVENTS: Pt was last seen 1 month ago for session. Pt continues to discuss her feelings about her sister-in-law who is undergoing treatment for stage iv colan cancer. Pt continues to report she is having a hard time watching her sister-in-law go through her treatments.  Pt also reports she continues to have regular contact with her friends and is getting to participate in Yoga, Pottery and having regular get together's with her friends.  Pt reports she continues to be very involved in her granddaughter's activities with school and now with softball. Pt reports her granddaughter's father's hearing has been postponed. Pt reports she is happy to see her granddaughter's father is doing a lot more with her seems to be making all events etc. Pt reports the new arrangement for caring for her mother seems to be working out between pt and her siblings.   Pt reports she continues to worry about the future with her  and how she will manage to learn to handle bills and finances when eventually her 's eyesight will be completely gone. Pt discussed in detail her fears of technology and her fear of learning how to manage finances on her computer. Clinician explored how pt could take an intro course on how to use a computer, purchase a simple laptop to learn on.  Clinician continues to work with pt to reframe concerns in practical ways that can be better addressed to alleviate her concerns. Clinician continues to work with pt on utilizing cbt skills to better manage and tolerate distress and improve interpersonal relationship  skills.  Pt reports to continue medication regimen as prescribed.  Clinician continues to work with pt on self acceptance, being more realistic with her self and others and working on identifying her excessive guilt especially as it relates to her family e.g. Relationship with children.  Clinician continues to challenge pt to work on having more structure in her daily scheduled and to work on being mindful of how much negative thinking she is dwelling on rather engaging in positive activities & focus on the present. Pt continues to report she has been compliant with medications since her last session.     Mood:less- anxious,  Affect:appropriate to circumstance, TP: circumstantial,tangential ,slight- rapid & pressured speech,  (+)racing thoughts, paranoid thinking, agitation, mood swings,(+) tearful episodes (+) worry,(+) obsessive & intrusive thoughts, ruminates on past events that tend to be negative with her adult children,Sleep: improved, No SI,insight/judgement: fair/improving.  Pt continues to present with automatic negative thoughts about her self continues to have insecurities, ego dystonic and continues to express extreme guilt over her lack of involvement with her mother.  Clinician continues to challenge these negative thoughts, focus on challenge statements, worked on mindfulness become of aware of her distortions that contribute to her feelings anxious and depressed. Pt continues to discuss stress mgmt coping skills, family dynamics. Pt continues to struggle with interpersonal relationships and family dynamics.      TREATMENT PLAN:     No changes in treatment plan from chart note of (date): 1/26/2012    Target Symptoms: Mood swings,depression, anxiety, irritability,anergiam anhedonia, affective lability, poor interpersonal relationships, co dependent bx's, guilt and racing thoughts, no motivation, frequent tearful episodes, cognitive distortions, paranoid & hopeless.    Therapeutic Intervention type:    Support  Behavior Modification  Medication Mgmt  Why chosen therapy is appropriate versus another modality:  Relevant to diagnosis:evidenced based.  Patient responds to this modality  Outcome monitoring methods:  Self-Report  Observation    RISK PARAMETERS:     Patient reports no suicidal ideation.  Patient reports no homicidal ideation.  Patient reports no self injurious behavior.  Patient reports no violent behavior.    PATIENT'S RESPONSE TO INTERVENTION:   The patient's response to intervention is accepting.    PROGRESS TOWARD GOALS AND OTHER MENTAL STATUS CHANGES:   The patient's progress toward goals is poor.    DIAGNOSIS  Bipolar DO, most recent manic  (296.52)  HUMPHREY (300.02)  Personality NOS (301.89)  Family Circumstance NOS/Caring for an aging parent with dementia (V61.3)    GLOBAL ASSESSMENT OF FUNCTION SCALE:  The patient's GAF is 70    PLAN:   Pt. should start to attend individual therapy once a week.   Medication Management: Pt is currently being followed by Dr. Mack.   See physician notes.  Return to clinic in 2-4 weeks.

## 2018-09-17 ENCOUNTER — OFFICE VISIT (OUTPATIENT)
Dept: PSYCHIATRY | Facility: CLINIC | Age: 58
End: 2018-09-17
Payer: COMMERCIAL

## 2018-09-17 DIAGNOSIS — Z65.8 INTERPERSONAL PROBLEM: ICD-10-CM

## 2018-09-17 DIAGNOSIS — F31.31 BIPOLAR AFFECTIVE DISORDER, CURRENTLY DEPRESSED, MILD: Primary | ICD-10-CM

## 2018-09-17 DIAGNOSIS — Z63.9 FAMILY PROBLEMS: ICD-10-CM

## 2018-09-17 DIAGNOSIS — F41.1 GAD (GENERALIZED ANXIETY DISORDER): ICD-10-CM

## 2018-09-17 DIAGNOSIS — F33.1 MODERATE EPISODE OF RECURRENT MAJOR DEPRESSIVE DISORDER: ICD-10-CM

## 2018-09-17 PROCEDURE — 90834 PSYTX W PT 45 MINUTES: CPT | Mod: S$GLB,,, | Performed by: SOCIAL WORKER

## 2018-09-17 SDOH — SOCIAL DETERMINANTS OF HEALTH (SDOH): PROBLEM RELATED TO PRIMARY SUPPORT GROUP, UNSPECIFIED: Z63.9

## 2018-09-18 NOTE — PROGRESS NOTES
ENCOUNTER DATE: 9/1718    SITE: [facility]    INSIGHT ORIENTED PSYCHOTHERAPY: [id number] 45-50 min    Met with patient .    ENCOUNTER REASON/CHIEF COMPLAINT(symptom, problem, diagnosis, follow-up, reason for encounter):   The patient presents for follow up for coping skills to better manage her anxiety, depression and stress. pt. struggles with self regulation of her emotional states.     INTERVAL EVENTS: Pt was last seen 3 weeks ago for session.  Pt continues to discuss her feelings about her sister-in-law who is undergoing treatment for stage iv colan cancer. Pt continues to report she is having a hard time watching her sister-in-law go through her treatments.  Pt also reports she continues to have regular contact with her friends and is getting to participate in Yoga, Pottery and having regular get together's with her friends.  Pt reports she continues to be very involved in her granddaughter's activities with school and now with softball. Pt reports her granddaughter's father's hearing has been postponed. Pt reports she is happy to see her granddaughter's father is doing a lot more with her seems to be making all events etc. Pt reports the arrangement for caring for her mother seems to be working out between pt and her siblings.   Pt reports she continues to worry about the future with her  and how she will manage to learn to handle bills and finances when eventually her 's eyesight will be completely gone.  Clinician continues to work with pt to reframe concerns in practical ways that can be better addressed to alleviate her concerns. Clinician continues to work with pt on utilizing cbt skills to better manage and tolerate distress and improve interpersonal relationship skills.  Pt reports to continue medication regimen as prescribed.  Clinician continues to work with pt on self acceptance, being more realistic with her self and others and working on identifying her excessive guilt especially as it  relates to her family e.g. Relationship with children.  Clinician continues to challenge pt to work on having more structure in her daily scheduled and to work on being mindful of how much negative thinking she is dwelling on rather engaging in positive activities & focus on the present. Pt continues to report she has been compliant with medications since her last session.     Mood:less- anxious,  Affect:appropriate to circumstance, TP: circumstantial,tangential ,slight- rapid & pressured speech,  (+)racing thoughts, paranoid thinking, agitation, mood swings,(+) tearful episodes (+) worry,(+) obsessive & intrusive thoughts, ruminates on past events that tend to be negative with her adult children,Sleep: improved, No SI,insight/judgement: fair/improving.  Pt continues to present with automatic negative thoughts about her self continues to have insecurities, ego dystonic and continues to express extreme guilt over her lack of involvement with her mother.  Clinician continues to challenge these negative thoughts, focus on challenge statements, worked on mindfulness become of aware of her distortions that contribute to her feelings anxious and depressed. Pt continues to discuss stress mgmt coping skills, family dynamics. Pt continues to struggle with interpersonal relationships and family dynamics.      TREATMENT PLAN:     No changes in treatment plan from chart note of (date): 1/26/2012    Target Symptoms: Mood swings,depression, anxiety, irritability,anergiam anhedonia, affective lability, poor interpersonal relationships, co dependent bx's, guilt and racing thoughts, no motivation, frequent tearful episodes, cognitive distortions, paranoid & hopeless.    Therapeutic Intervention type:   Support  Behavior Modification  Medication Mgmt  Why chosen therapy is appropriate versus another modality:  Relevant to diagnosis:evidenced based.  Patient responds to this modality  Outcome monitoring  methods:  Self-Report  Observation    RISK PARAMETERS:     Patient reports no suicidal ideation.  Patient reports no homicidal ideation.  Patient reports no self injurious behavior.  Patient reports no violent behavior.    PATIENT'S RESPONSE TO INTERVENTION:   The patient's response to intervention is accepting.    PROGRESS TOWARD GOALS AND OTHER MENTAL STATUS CHANGES:   The patient's progress toward goals is poor.    DIAGNOSIS  Bipolar DO, most recent manic  (296.52)  HUMPHREY (300.02)  Personality NOS (301.89)  Family Circumstance NOS/Caring for an aging parent with dementia (V61.3)    GLOBAL ASSESSMENT OF FUNCTION SCALE:  The patient's GAF is 70    PLAN:   Pt. should start to attend individual therapy once a week.   Medication Management: Pt is currently being followed by Dr. Mack.   See physician notes.  Return to clinic in 2-4 weeks.

## 2018-10-08 ENCOUNTER — OFFICE VISIT (OUTPATIENT)
Dept: PSYCHIATRY | Facility: CLINIC | Age: 58
End: 2018-10-08
Payer: COMMERCIAL

## 2018-10-08 DIAGNOSIS — Z65.8 INTERPERSONAL PROBLEM: ICD-10-CM

## 2018-10-08 DIAGNOSIS — Z63.9 FAMILY PROBLEMS: ICD-10-CM

## 2018-10-08 DIAGNOSIS — F31.31 BIPOLAR AFFECTIVE DISORDER, CURRENTLY DEPRESSED, MILD: Primary | ICD-10-CM

## 2018-10-08 DIAGNOSIS — F41.1 GAD (GENERALIZED ANXIETY DISORDER): ICD-10-CM

## 2018-10-08 PROCEDURE — 90834 PSYTX W PT 45 MINUTES: CPT | Mod: S$GLB,,, | Performed by: SOCIAL WORKER

## 2018-10-08 SDOH — SOCIAL DETERMINANTS OF HEALTH (SDOH): PROBLEM RELATED TO PRIMARY SUPPORT GROUP, UNSPECIFIED: Z63.9

## 2018-10-11 NOTE — PROGRESS NOTES
ENCOUNTER DATE: 10/8/18    SITE: [facility]    INSIGHT ORIENTED PSYCHOTHERAPY: [id number] 45-50 min    Met with patient .    ENCOUNTER REASON/CHIEF COMPLAINT(symptom, problem, diagnosis, follow-up, reason for encounter):   The patient presents for follow up for coping skills to better manage her anxiety, depression and stress. pt. struggles with self regulation of her emotional states.     INTERVAL EVENTS: Pt was last seen 4 weeks ago for session. Pt reports she has started to feel more depressed as this time of year when the fall starts and the holidays are near in the future pt reports feeling very down and sad. Pt continues to desire to have her family together to celebrate and have gatherings at her home. Pt reflected on the past when she was young and when her children were young what it was like and how much she enjoyed the presence of a large family. Pt discussed several areas of regret surrounding how things have turned out with family relationships and severed family ties. Additionally-Pt continues to discuss her feelings about her sister-in-law who is undergoing treatment for stage iv colan cancer. Pt continues to report she is having a hard time watching her sister-in-law go through her treatments.  Pt also reports she continues to have regular contact with her friends and is getting to participate in Yoga, Pottery and having regular get together's with her friends.  Pt reports she continues to be very involved in her granddaughter's activities with school and now with softball. Pt reports her granddaughter's father's hearing has been postponed. Pt reports she is happy to see her granddaughter's father is doing a lot more with her seems to be making all events etc. Pt reports the arrangement for caring for her mother seems to be working out between pt and her siblings.   Pt reports she continues to worry about the future with her  and how she will manage to learn to handle bills and finances when  eventually her 's eyesight will be completely gone.  Clinician continues to work with pt to reframe concerns in practical ways that can be better addressed to alleviate her concerns. Clinician continues to work with pt on utilizing cbt skills to better manage and tolerate distress and improve interpersonal relationship skills.  Pt reports to continue medication regimen as prescribed.  Clinician continues to work with pt on self acceptance, being more realistic with her self and others and working on identifying her excessive guilt especially as it relates to her family e.g. Relationship with children.  Clinician continues to challenge pt to work on having more structure in her daily scheduled and to work on being mindful of how much negative thinking she is dwelling on rather engaging in positive activities & focus on the present. Pt continues to report she has been compliant with medications since her last session.     Mood:less- anxious/ more depressed,  Affect:appropriate to circumstance, TP: circumstantial,tangential ,slight- rapid & pressured speech,  (+)racing thoughts, paranoid thinking, agitation, mood swings,(+) tearful episodes (+) worry,(+) obsessive & intrusive thoughts, ruminates on past events that tend to be negative with her adult children,Sleep: improved, No SI,insight/judgement: fair/improving.  Pt continues to present with automatic negative thoughts about her self continues to have insecurities, ego dystonic and continues to express extreme guilt over her lack of involvement with her mother.  Clinician continues to challenge these negative thoughts, focus on challenge statements, worked on mindfulness become of aware of her distortions that contribute to her feelings anxious and depressed. Pt continues to discuss stress mgmt coping skills, family dynamics. Pt continues to struggle with interpersonal relationships and family dynamics.      TREATMENT PLAN:     No changes in treatment plan  from chart note of (date): 1/26/2012    Target Symptoms: Mood swings,depression, anxiety, irritability,anergiam anhedonia, affective lability, poor interpersonal relationships, co dependent bx's, guilt and racing thoughts, no motivation, frequent tearful episodes, cognitive distortions, paranoid & hopeless.    Therapeutic Intervention type:   Support  Behavior Modification  Medication Mgmt  Why chosen therapy is appropriate versus another modality:  Relevant to diagnosis:evidenced based.  Patient responds to this modality  Outcome monitoring methods:  Self-Report  Observation    RISK PARAMETERS:     Patient reports no suicidal ideation.  Patient reports no homicidal ideation.  Patient reports no self injurious behavior.  Patient reports no violent behavior.    PATIENT'S RESPONSE TO INTERVENTION:   The patient's response to intervention is accepting.    PROGRESS TOWARD GOALS AND OTHER MENTAL STATUS CHANGES:   The patient's progress toward goals is poor.    DIAGNOSIS  Bipolar DO, most recent manic  (296.52)  HUMPHREY (300.02)  Personality NOS (301.89)  Family Circumstance NOS/Caring for an aging parent with dementia (V61.3)    GLOBAL ASSESSMENT OF FUNCTION SCALE:  The patient's GAF is 70    PLAN:   Pt. should start to attend individual therapy once a week.   Medication Management: Pt is currently being followed by Dr. Mack.   See physician notes.  Return to clinic in 2-4 weeks.

## 2018-10-29 ENCOUNTER — OFFICE VISIT (OUTPATIENT)
Dept: PSYCHIATRY | Facility: CLINIC | Age: 58
End: 2018-10-29
Payer: COMMERCIAL

## 2018-10-29 DIAGNOSIS — F41.1 GAD (GENERALIZED ANXIETY DISORDER): ICD-10-CM

## 2018-10-29 DIAGNOSIS — F31.62 BIPOLAR DISORDER, CURRENT EPISODE MIXED, MODERATE: Primary | ICD-10-CM

## 2018-10-29 DIAGNOSIS — Z63.9 FAMILY PROBLEMS: ICD-10-CM

## 2018-10-29 DIAGNOSIS — Z65.8 INTERPERSONAL PROBLEM: ICD-10-CM

## 2018-10-29 PROCEDURE — 90834 PSYTX W PT 45 MINUTES: CPT | Mod: S$GLB,,, | Performed by: SOCIAL WORKER

## 2018-10-29 SDOH — SOCIAL DETERMINANTS OF HEALTH (SDOH): PROBLEM RELATED TO PRIMARY SUPPORT GROUP, UNSPECIFIED: Z63.9

## 2018-10-30 NOTE — PROGRESS NOTES
ENCOUNTER DATE: 10/29/18    SITE: [facility]    INSIGHT ORIENTED PSYCHOTHERAPY: [id number] 45-50 min    Met with patient .    ENCOUNTER REASON/CHIEF COMPLAINT(symptom, problem, diagnosis, follow-up, reason for encounter):   The patient presents for follow up for coping skills to better manage her anxiety, depression and stress. pt. struggles with self regulation of her emotional states.     INTERVAL EVENTS: Pt was last seen 2 weeks ago for session. Pt continues to report she is feelingmore depressed. Pt reports feeling more reminiscent of holidays past starting with Halloween and being with family and her children were young. Pt continues to discuss several areas of regret surrounding how things have turned out with family relationships and severed family ties. Additionally-Pt continues to discuss her feelings about her sister-in-law who is undergoing treatment for stage iv colan cancer. Pt continues to report she is having a hard time watching her sister-in-law go through her treatments. Pt continues to worry about her daughter's mental health and the state of influx her daughter seems to be in her living arrangement with her boyfriend. Pt reports she has been worrying more about everything has had more racing thoughts. Pt reports feeling less tolerable of her mother while over during her weekly visit. Pt reports feeling more on edge with everything.  Pt also reports she continues to have regular contact with her friends and is getting to participate in Yoga, Pottery and having regular get together's with her friends.  Pt reports she continues to be very involved in her granddaughter's activities with school and now with softball. Pt reports her granddaughter's father's hearing has been postponed. Pt reports she is happy to see her granddaughter's father is doing a lot more with her seems to be making all events etc. Pt reports the arrangement for caring for her mother seems to be working out between pt and her  siblings.   Pt reports she continues to worry about the future with her  and how she will manage to learn to handle bills and finances when eventually her 's eyesight will be completely gone.  Clinician continues to work with pt to reframe concerns in practical ways that can be better addressed to alleviate her concerns.  Clinician advised pt to follow up with her treating psychiatrist to asses medications. Clinician continues to work with pt on utilizing cbt skills to better manage and tolerate distress and improve interpersonal relationship skills.   Clinician continues to work with pt on self acceptance, being more realistic with her self and others and working on identifying her excessive guilt especially as it relates to her family e.g. Relationship with children.  Clinician continues to challenge pt to work on having more structure in her daily scheduled and to work on being mindful of how much negative thinking she is dwelling on rather engaging in positive activities & focus on the present. Pt continues to report she has been compliant with medications since her last session.     Mood:less- anxious/ more depressed,  Affect:appropriate to circumstance, TP: circumstantial,tangential ,slight- rapid & pressured speech,  (+)racing thoughts, paranoid thinking, agitation, mood swings,(+) tearful episodes (+) worry,(+) obsessive & intrusive thoughts, ruminates on past events that tend to be negative with her adult children,Sleep: improved, No SI,insight/judgement: fair/improving.  Pt continues to present with automatic negative thoughts about her self continues to have insecurities, ego dystonic and continues to express extreme guilt over her lack of involvement with her mother.  Clinician continues to challenge these negative thoughts, focus on challenge statements, worked on mindfulness become of aware of her distortions that contribute to her feelings anxious and depressed. Pt continues to discuss  stress mgmt coping skills, family dynamics. Pt continues to struggle with interpersonal relationships and family dynamics.      TREATMENT PLAN:     No changes in treatment plan from chart note of (date): 1/26/2012    Target Symptoms: Mood swings,depression, anxiety, irritability,anergiam anhedonia, affective lability, poor interpersonal relationships, co dependent bx's, guilt and racing thoughts, no motivation, frequent tearful episodes, cognitive distortions, paranoid & hopeless.    Therapeutic Intervention type:   Support  Behavior Modification  Medication Mgmt  Why chosen therapy is appropriate versus another modality:  Relevant to diagnosis:evidenced based.  Patient responds to this modality  Outcome monitoring methods:  Self-Report  Observation    RISK PARAMETERS:     Patient reports no suicidal ideation.  Patient reports no homicidal ideation.  Patient reports no self injurious behavior.  Patient reports no violent behavior.    PATIENT'S RESPONSE TO INTERVENTION:   The patient's response to intervention is accepting.    PROGRESS TOWARD GOALS AND OTHER MENTAL STATUS CHANGES:   The patient's progress toward goals is poor.    DIAGNOSIS  Bipolar DO, most recent manic  (296.52)  HUMPHREY (300.02)  Personality NOS (301.89)  Family Circumstance NOS/Caring for an aging parent with dementia (V61.3)    GLOBAL ASSESSMENT OF FUNCTION SCALE:  The patient's GAF is 70    PLAN:   Pt. should start to attend individual therapy once a week.   Medication Management: Pt is currently being followed by Dr. Mack.   See physician notes.  Return to clinic in 2-4 weeks.

## 2018-12-05 ENCOUNTER — OFFICE VISIT (OUTPATIENT)
Dept: FAMILY MEDICINE | Facility: CLINIC | Age: 58
End: 2018-12-05
Payer: COMMERCIAL

## 2018-12-05 VITALS
HEIGHT: 63 IN | WEIGHT: 130.75 LBS | DIASTOLIC BLOOD PRESSURE: 68 MMHG | SYSTOLIC BLOOD PRESSURE: 110 MMHG | HEART RATE: 90 BPM | TEMPERATURE: 99 F | BODY MASS INDEX: 23.17 KG/M2

## 2018-12-05 DIAGNOSIS — M54.9 UPPER BACK PAIN ON LEFT SIDE: Primary | ICD-10-CM

## 2018-12-05 DIAGNOSIS — R31.9 HEMATURIA, UNSPECIFIED TYPE: ICD-10-CM

## 2018-12-05 LAB
BILIRUB SERPL-MCNC: NEGATIVE MG/DL
BLOOD URINE, POC: NEGATIVE
COLOR, POC UA: YELLOW
GLUCOSE UR QL STRIP: NORMAL
KETONES UR QL STRIP: NEGATIVE
LEUKOCYTE ESTERASE URINE, POC: NORMAL
NITRITE, POC UA: NEGATIVE
PH, POC UA: 7
PROTEIN, POC: NEGATIVE
SPECIFIC GRAVITY, POC UA: 1.01
UROBILINOGEN, POC UA: NORMAL

## 2018-12-05 PROCEDURE — 99214 OFFICE O/P EST MOD 30 MIN: CPT | Mod: 25,S$GLB,, | Performed by: FAMILY MEDICINE

## 2018-12-05 PROCEDURE — 3008F BODY MASS INDEX DOCD: CPT | Mod: CPTII,S$GLB,, | Performed by: FAMILY MEDICINE

## 2018-12-05 PROCEDURE — 87086 URINE CULTURE/COLONY COUNT: CPT

## 2018-12-05 PROCEDURE — 99999 PR PBB SHADOW E&M-EST. PATIENT-LVL IV: CPT | Mod: PBBFAC,,, | Performed by: FAMILY MEDICINE

## 2018-12-05 PROCEDURE — 81002 URINALYSIS NONAUTO W/O SCOPE: CPT | Mod: S$GLB,,, | Performed by: FAMILY MEDICINE

## 2018-12-05 RX ORDER — METHOCARBAMOL 500 MG/1
500 TABLET, FILM COATED ORAL 3 TIMES DAILY PRN
Qty: 30 TABLET | Refills: 0 | Status: SHIPPED | OUTPATIENT
Start: 2018-12-05 | End: 2018-12-15

## 2018-12-05 NOTE — PATIENT INSTRUCTIONS
Try to decrease Advil & stop over a week    If not better, [place]    ====================    Increase FIBER INTAKE - your body is happiest with FIVE FRESH COLORS of fruits and  vegetables DAILY    With respect to FIBER intake, you should have 5-10 grams of viscous soluble fiber daily. This  Includes fruit (bananas, apples, pears, blackberries, citrus, peaches, plums, nectarines), whole grains (oatmeal, oat bran, all-bran cereal, granola, shredded wheat, wheat germ, teddy barley, brown rice), legumes (northern/camacho/navy/lima/kidney/black beans, peas, lentils), seeds (psyllium), and vegetables (carrots, broccoli, brussels sprouts, artichokes).      ---------------------------------------------------------------------------------------------------------    GET MOVING-- Walking & being active (20 minutes most days of the week). [email or web address]    This is the best way to strengthen & protect your heart & all blood vessels in your body .     Cholesterol is significantly lowered with these dietary changes & exercise.     If you do the above & would like to recheck your cholesterol to see your progress -- just let me know.     ====  She will make appt with her Dermatologist for R earlobe lesion.

## 2018-12-05 NOTE — PROGRESS NOTES
Subjective:      Patient ID: Kimberly Jo is a 58 y.o. female.    Chief Complaint: Back Pain (upper left side)    Here today for discomfort in the left shoulder for the past 2 weeks, she states it really hurts, bad when I wake up.  Denies any trauma, it feels bruised way inside.  She is taking about 2 Advil a day in the past 2 days.  It has slightly helped.  No pain with taking a deep breath.  No lifting moving pulling, no fall, no trauma, no coughing, no recent doses of respiratory infection.  She does carry a heavy purse on her left side.  No rash      Current Outpatient Medications on File Prior to Visit   Medication Sig    biotin 2,500 mcg Tab Take 50,000 mcg by mouth once daily.    cholecalciferol, vitamin D3, (VITAMIN D3) 2,000 unit Tab Take by mouth once daily.    diazePAM (VALIUM) 5 MG tablet Take 5 mg by mouth every 6 (six) hours as needed.     lamotrigine (LAMICTAL) 200 MG tablet 2 (two) times daily.     quetiapine (SEROQUEL XR) 400 MG Tb24 once daily at 6am. 450mg daily    tamsulosin (FLOMAX) 0.4 mg Cp24 Take 1 capsule (0.4 mg total) by mouth once daily.     No current facility-administered medications on file prior to visit.      Past Medical History:   Diagnosis Date    Depression     Dr Lourdes Mack & therapist Jaziel    History of herpes zoster 06/21/2017    tx GYN    HLD (hyperlipidemia) 2/2/2016    Mixed hyperlipidemia 2/2/2016    Moderate episode of recurrent major depressive disorder 8/16/2016    Slow transit constipation 2/2/2016    CS 50 yo    SVT (supraventricular tachycardia)     2015 ablated with Adenosine EMT (too much caffeine), cardiologist rec bblocker if it recurrs    Vitamin D deficiency     2015 OTC suppl     No past surgical history on file.  Social History     Social History Narrative    Homemaker, enjoys yoga,  Jimbo with glaucoma, 2 children, nonsmoker, ETOH socially, GYN here & [place], neg HPV 2018, repeat 2021,  normal colonoscopy 50 yo per pt with doctor  "on [place]     Family History   Problem Relation Age of Onset    Melanoma Father     Cancer Father     Heart disease Brother 28        after injury hit head    Hypertension Mother     Alzheimer's disease Mother     Breast cancer Neg Hx     Colon cancer Neg Hx     Ovarian cancer Neg Hx      Vitals:    12/05/18 0908   BP: 110/68   Pulse: 90   Temp: 98.5 °F (36.9 °C)   TempSrc: Oral   Weight: 59.3 kg (130 lb 11.7 oz)   Height: 5' 3" (1.6 m)   PainSc:   6   PainLoc: Back     Objective:   Physical Exam   Cardiovascular: Normal rate and regular rhythm.   Pulmonary/Chest: Effort normal and breath sounds normal. No stridor. No respiratory distress. She has no wheezes. She has no rales.   Tender left mom void region with palpation, no discomfort with taking a full deep breath, full range of motion of bilateral shoulders   Musculoskeletal:   Tender LEFT rhomboids/ costochondral muscles tender, no rash, no skin changes, Neck supple, cervical spine nontender, can touch chin to chest and to both shoulders, full range of motion of cervical spine. Full range of motion of the shoulder joints,nontender a.c. Joint, or deltoids, 4/5 bilateral strength of biceps and tricep bilateral, 2+ pulses, less than 2 second capillary refill, no swelling       Assessment:     1. Upper back pain on left side          Plan:                 methocarbamol (ROBAXIN) 500 MG Tab       Patient Instructions   Try to decrease Advil & stop over a week    If not better, she [place] app[place] [facility] [place]    ====================    Increase FIBER INTAKE - your body is happiest with FIVE FRESH COLORS of fruits and  vegetables DAILY    With respect to FIBER intake, you should have 5-10 grams of viscous soluble fiber daily. This  Includes fruit (bananas, apples, pears, blackberries, citrus, peaches, plums, nectarines), whole grains (oatmeal, oat bran, all-bran cereal, granola, shredded wheat, wheat germ, teddy barley, brown rice), legumes " (northern/camacho/navy/lima/kidney/black beans, peas, lentils), seeds (psyllium), and vegetables (carrots, broccoli, brussels sprouts, artichokes).      ---------------------------------------------------------------------------------------------------------    GET MOVING-- Walking & being active (20 minutes most days of the week). [email or web address]    This is the best way to strengthen & protect your heart & all blood vessels in your body .     Cholesterol is significantly lowered with these dietary changes & exercise.     If you do the above & would like to recheck your cholesterol to see your progress -- just let me know.     ====  She will make appt with her Dermatologist for R earlobe lesion.

## 2018-12-07 LAB — BACTERIA UR CULT: NO GROWTH

## 2018-12-17 ENCOUNTER — OFFICE VISIT (OUTPATIENT)
Dept: PSYCHIATRY | Facility: CLINIC | Age: 58
End: 2018-12-17
Payer: COMMERCIAL

## 2018-12-17 DIAGNOSIS — F41.1 GAD (GENERALIZED ANXIETY DISORDER): ICD-10-CM

## 2018-12-17 DIAGNOSIS — Z63.9 FAMILY PROBLEMS: ICD-10-CM

## 2018-12-17 DIAGNOSIS — F31.62 BIPOLAR DISORDER, CURRENT EPISODE MIXED, MODERATE: Primary | ICD-10-CM

## 2018-12-17 DIAGNOSIS — Z65.8 INTERPERSONAL PROBLEM: ICD-10-CM

## 2018-12-17 PROCEDURE — 90834 PSYTX W PT 45 MINUTES: CPT | Mod: S$GLB,,, | Performed by: SOCIAL WORKER

## 2018-12-17 SDOH — SOCIAL DETERMINANTS OF HEALTH (SDOH): PROBLEM RELATED TO PRIMARY SUPPORT GROUP, UNSPECIFIED: Z63.9

## 2018-12-17 NOTE — PROGRESS NOTES
"  ENCOUNTER DATE: 12/17/18    SITE: [facility]    INSIGHT ORIENTED PSYCHOTHERAPY: 42665 45-50 min    Met with patient .    ENCOUNTER REASON/CHIEF COMPLAINT(symptom, problem, diagnosis, follow-up, reason for encounter):   The patient presents for follow up for coping skills to better manage her anxiety, depression and stress. pt. struggles with self regulation of her emotional states.     INTERVAL EVENTS: Pt was last seen 6 weeks ago for session. Pt continues to report she is feeling more depressed. Pt continues to report feeling more reminiscent of holidays from her childhood. Pt reports she is having a difficult time bc of this time of year and it brings up so many disappointments for her. Pt reports she is tired of being on her medication and would prefer to be off of it because she would like to "feel more productive or better".  Clinician advised pt of the dangers of stopping her meds without discussing it with her prescribing provider (Dr. Lourdes Mack).  Pt agreed she would not do anything without discussing it with her psychiatrist. Pt discussed her plans for the holiday and how she might get through it.  Pt continues to discuss several areas of regret surrounding how things have turned out with family relationships and severed family ties. Additionally-Pt continues to discuss her feelings about her sister-in-law who is undergoing treatment for stage iv colan cancer. Pt continues to report she is having a hard time watching her sister-in-law go through her treatments. Pt continues discuss her frustration with her daughter and the state of influx her daughter seems to be in her living arrangement with her boyfriend. Pt reports she has been worrying more about everything has had more racing thoughts.  Pt also reports she continues to have regular contact with her friends and is getting to participate in Yoga, Pottery and having regular get together's with her friends.  Pt reports she continues to be very " involved in her granddaughter's activities with school. Pt reports her granddaughter's father's hearing has been postponed. Pt reports she is happy to see her granddaughter's father is doing a lot more with her seems to be making all events etc. Pt reports the arrangement for caring for her mother seems to be working out between pt and her siblings.   Pt reports she continues to worry about the future with her  and how she will manage to learn to handle bills and finances when eventually her 's eyesight will be completely gone.  Clinician continues to work with pt to reframe concerns in practical ways that can be better addressed to alleviate her concerns.  Clinician advised pt to follow up with her treating psychiatrist to asses medications. Clinician continues to work with pt on utilizing cbt skills to better manage and tolerate distress and improve interpersonal relationship skills.   Clinician continues to work with pt on self acceptance, being more realistic with her self and others and working on identifying her excessive guilt especially as it relates to her family e.g. Relationship with children.  Clinician continues to challenge pt to work on having more structure in her daily scheduled and to work on being mindful of how much negative thinking she is dwelling on rather engaging in positive activities & focus on the present. Pt continues to report she has been compliant with medications since her last session.     Mood:less- anxious/ more depressed,  Affect:appropriate to circumstance, TP: circumstantial,tangential ,slight- rapid & pressured speech,  (+)racing thoughts, paranoid thinking, agitation, mood swings,(+) tearful episodes (+) worry,(+) obsessive & intrusive thoughts, ruminates on past events that tend to be negative with her adult children,Sleep: improved, No SI,insight/judgement: fair/improving.  Pt continues to present with automatic negative thoughts about her self continues to  have insecurities, ego dystonic and continues to express extreme guilt over her lack of involvement with her mother.  Clinician continues to challenge these negative thoughts, focus on challenge statements, worked on mindfulness become of aware of her distortions that contribute to her feelings anxious and depressed. Pt continues to discuss stress mgmt coping skills, family dynamics. Pt continues to struggle with interpersonal relationships and family dynamics.      TREATMENT PLAN:     No changes in treatment plan from chart note of (date): 1/26/2012    Target Symptoms: Mood swings,depression, anxiety, irritability,anergiam anhedonia, affective lability, poor interpersonal relationships, co dependent bx's, guilt and racing thoughts, no motivation, frequent tearful episodes, cognitive distortions, paranoid & hopeless.    Therapeutic Intervention type:   Support  Behavior Modification  Medication Mgmt  Why chosen therapy is appropriate versus another modality:  Relevant to diagnosis:evidenced based.  Patient responds to this modality  Outcome monitoring methods:  Self-Report  Observation    RISK PARAMETERS:     Patient reports no suicidal ideation.  Patient reports no homicidal ideation.  Patient reports no self injurious behavior.  Patient reports no violent behavior.    PATIENT'S RESPONSE TO INTERVENTION:   The patient's response to intervention is accepting.    PROGRESS TOWARD GOALS AND OTHER MENTAL STATUS CHANGES:   The patient's progress toward goals is poor.    DIAGNOSIS  Bipolar DO, most recent manic  (296.52)  HUMPHREY (300.02)  Personality NOS (301.89)  Family Circumstance NOS/Caring for an aging parent with dementia (V61.3)    GLOBAL ASSESSMENT OF FUNCTION SCALE:  The patient's GAF is 70    PLAN:   Pt. should start to attend individual therapy once a week.   Medication Management: Pt is currently being followed by Dr. Mack.   See physician notes.  Return to clinic in 2-4 weeks.

## 2019-01-14 ENCOUNTER — OFFICE VISIT (OUTPATIENT)
Dept: PSYCHIATRY | Facility: CLINIC | Age: 59
End: 2019-01-14
Payer: COMMERCIAL

## 2019-01-14 DIAGNOSIS — Z63.9 FAMILY PROBLEMS: ICD-10-CM

## 2019-01-14 DIAGNOSIS — F33.1 MODERATE EPISODE OF RECURRENT MAJOR DEPRESSIVE DISORDER: ICD-10-CM

## 2019-01-14 DIAGNOSIS — F31.62 BIPOLAR DISORDER, CURRENT EPISODE MIXED, MODERATE: Primary | ICD-10-CM

## 2019-01-14 DIAGNOSIS — Z65.8 INTERPERSONAL PROBLEM: ICD-10-CM

## 2019-01-14 DIAGNOSIS — F41.1 GAD (GENERALIZED ANXIETY DISORDER): ICD-10-CM

## 2019-01-14 PROCEDURE — 90834 PR PSYCHOTHERAPY W/PATIENT, 45 MIN: ICD-10-PCS | Mod: S$GLB,,, | Performed by: SOCIAL WORKER

## 2019-01-14 PROCEDURE — 90834 PSYTX W PT 45 MINUTES: CPT | Mod: S$GLB,,, | Performed by: SOCIAL WORKER

## 2019-01-14 SDOH — SOCIAL DETERMINANTS OF HEALTH (SDOH): PROBLEM RELATED TO PRIMARY SUPPORT GROUP, UNSPECIFIED: Z63.9

## 2019-01-14 NOTE — PROGRESS NOTES
"  ENCOUNTER DATE: 1/14/19    SITE: [facility]    INSIGHT ORIENTED PSYCHOTHERAPY: [id number] 45-50 min    Met with patient .    ENCOUNTER REASON/CHIEF COMPLAINT(symptom, problem, diagnosis, follow-up, reason for encounter):   The patient presents for follow up for coping skills to better manage her anxiety, depression and stress. pt. struggles with self regulation of her emotional states.     INTERVAL EVENTS: Pt was last seen 4 weeks ago for session. Pt presents with her  in session. Pt reports to have a list of concerns she would like to review with clinician. Pt is noted having  more rapid & slightly pressured speech. Pt reports she has felt more anxiety lately and struggles with not being able to focus on setting and or committing to a more structured schedule for herself.  Pt continues to report she is tired of being on her medication and would prefer to be off of it because she would like to "feel more productive or better".  Clinician continues to advise pt of the dangers of stopping her meds without discussing it with her prescribing provider (Dr. Lourdes Mack).  Pt agreed she would not do anything without discussing it with her psychiatrist. Pt's  was also participating in this discussion and agreed that she should not stop her medication.     Pt continues to discuss several areas of regret surrounding how things have turned out with family relationships and severed family ties. Additionally-Pt continues to discuss her feelings about her sister-in-law who is undergoing treatment for stage iv colan cancer. Pt continues to report she is having a hard time watching her sister-in-law go through her treatments. Pt continues discuss her frustration with her daughter and the state of influx her daughter seems to be in her living arrangement with her boyfriend. Pt reports she has been worrying more about everything has had more racing thoughts.  Pt also reports she continues to have regular contact with her " friends and is getting to participate in Yoga, Pottery and having regular get together's with her friends.  Pt reports she continues to be very involved in her granddaughter's activities with school. Pt continues to report her granddaughter's father's hearing has been postponed. Pt reports she is happy to see her granddaughter's father is doing a lot more with her seems to be making all events etc. Pt reports the arrangement for caring for her mother seems to be working out between pt and her siblings.  Clinician continues to work with pt to reframe concerns in practical ways that can be better addressed to alleviate her concerns.  Clinician advised pt to follow up with her treating psychiatrist to asses medications. Clinician continues to work with pt on utilizing cbt skills to better manage and tolerate distress and improve interpersonal relationship skills.   Clinician continues to work with pt on self acceptance, being more realistic with her self and others and working on identifying her excessive guilt especially as it relates to her family e.g. Relationship with children.  Clinician continues to challenge pt to work on having more structure in her daily scheduled and to work on being mindful of how much negative thinking she is dwelling on rather engaging in positive activities & focus on the present. Additionally, clinician suggested to pt with  to consider taking a small vacation and taking a break from her stressors with the family. Pt's  was receptive to this decision.  Pt continues to report she has been compliant with medications since her last session.     Mood:less- anxious/ more depressed,  Affect:appropriate to circumstance, TP: circumstantial,tangential ,slight- rapid & pressured speech,  (+)racing thoughts, paranoid thinking, agitation, mood swings,(+) tearful episodes (+) worry,(+) obsessive & intrusive thoughts, ruminates on past events that tend to be negative with her adult  children,Sleep: improved, No SI,insight/judgement: fair/improving.  Pt continues to present with automatic negative thoughts about her self continues to have insecurities, ego dystonic and continues to express extreme guilt over her lack of involvement with her mother.  Clinician continues to challenge these negative thoughts, focus on challenge statements, worked on mindfulness become of aware of her distortions that contribute to her feelings anxious and depressed. Pt continues to discuss stress mgmt coping skills, family dynamics. Pt continues to struggle with interpersonal relationships and family dynamics.      TREATMENT PLAN:     No changes in treatment plan from chart note of (date): 1/26/2012    Target Symptoms: Mood swings,depression, anxiety, irritability,anergiam anhedonia, affective lability, poor interpersonal relationships, co dependent bx's, guilt and racing thoughts, no motivation, frequent tearful episodes, cognitive distortions, paranoid & hopeless.    Therapeutic Intervention type:   Support  Behavior Modification  Medication Mgmt  Why chosen therapy is appropriate versus another modality:  Relevant to diagnosis:evidenced based.  Patient responds to this modality  Outcome monitoring methods:  Self-Report  Observation    RISK PARAMETERS:     Patient reports no suicidal ideation.  Patient reports no homicidal ideation.  Patient reports no self injurious behavior.  Patient reports no violent behavior.    PATIENT'S RESPONSE TO INTERVENTION:   The patient's response to intervention is accepting.    PROGRESS TOWARD GOALS AND OTHER MENTAL STATUS CHANGES:   The patient's progress toward goals is poor.    DIAGNOSIS  Bipolar DO, most recent manic  (296.52)  HUMPHREY (300.02)  Personality NOS (301.89)  Family Circumstance NOS/Caring for an aging parent with dementia (V61.3)    GLOBAL ASSESSMENT OF FUNCTION SCALE:  The patient's GAF is 70    PLAN:   Pt. should start to attend individual therapy once a week.    Medication Management: Pt is currently being followed by Dr. Mack.   See physician notes.  Return to clinic in 2-4 weeks.

## 2019-02-11 ENCOUNTER — OFFICE VISIT (OUTPATIENT)
Dept: PSYCHIATRY | Facility: CLINIC | Age: 59
End: 2019-02-11
Payer: COMMERCIAL

## 2019-02-11 DIAGNOSIS — Z65.8 INTERPERSONAL PROBLEM: ICD-10-CM

## 2019-02-11 DIAGNOSIS — F41.1 GAD (GENERALIZED ANXIETY DISORDER): ICD-10-CM

## 2019-02-11 DIAGNOSIS — F33.1 MODERATE EPISODE OF RECURRENT MAJOR DEPRESSIVE DISORDER: ICD-10-CM

## 2019-02-11 DIAGNOSIS — F31.11 BIPOLAR AFFECTIVE DISORDER, CURRENTLY MANIC, MILD: Primary | ICD-10-CM

## 2019-02-11 DIAGNOSIS — Z63.9 FAMILY PROBLEMS: ICD-10-CM

## 2019-02-11 PROCEDURE — 90834 PR PSYCHOTHERAPY W/PATIENT, 45 MIN: ICD-10-PCS | Mod: S$GLB,,, | Performed by: SOCIAL WORKER

## 2019-02-11 PROCEDURE — 90834 PSYTX W PT 45 MINUTES: CPT | Mod: S$GLB,,, | Performed by: SOCIAL WORKER

## 2019-02-11 SDOH — SOCIAL DETERMINANTS OF HEALTH (SDOH): PROBLEM RELATED TO PRIMARY SUPPORT GROUP, UNSPECIFIED: Z63.9

## 2019-02-25 ENCOUNTER — OFFICE VISIT (OUTPATIENT)
Dept: PSYCHIATRY | Facility: CLINIC | Age: 59
End: 2019-02-25
Payer: COMMERCIAL

## 2019-02-25 DIAGNOSIS — Z65.8 INTERPERSONAL PROBLEM: ICD-10-CM

## 2019-02-25 DIAGNOSIS — F41.1 GAD (GENERALIZED ANXIETY DISORDER): ICD-10-CM

## 2019-02-25 DIAGNOSIS — Z63.9 FAMILY PROBLEMS: ICD-10-CM

## 2019-02-25 DIAGNOSIS — F31.62 BIPOLAR DISORDER, CURRENT EPISODE MIXED, MODERATE: Primary | ICD-10-CM

## 2019-02-25 DIAGNOSIS — F33.1 MODERATE EPISODE OF RECURRENT MAJOR DEPRESSIVE DISORDER: ICD-10-CM

## 2019-02-25 PROCEDURE — 90834 PR PSYCHOTHERAPY W/PATIENT, 45 MIN: ICD-10-PCS | Mod: S$GLB,,, | Performed by: SOCIAL WORKER

## 2019-02-25 PROCEDURE — 90834 PSYTX W PT 45 MINUTES: CPT | Mod: S$GLB,,, | Performed by: SOCIAL WORKER

## 2019-02-25 SDOH — SOCIAL DETERMINANTS OF HEALTH (SDOH): PROBLEM RELATED TO PRIMARY SUPPORT GROUP, UNSPECIFIED: Z63.9

## 2019-03-11 ENCOUNTER — OFFICE VISIT (OUTPATIENT)
Dept: PSYCHIATRY | Facility: CLINIC | Age: 59
End: 2019-03-11
Payer: COMMERCIAL

## 2019-03-11 DIAGNOSIS — Z65.8 INTERPERSONAL PROBLEM: ICD-10-CM

## 2019-03-11 DIAGNOSIS — F31.62 BIPOLAR DISORDER, CURRENT EPISODE MIXED, MODERATE: Primary | ICD-10-CM

## 2019-03-11 DIAGNOSIS — F33.1 MODERATE EPISODE OF RECURRENT MAJOR DEPRESSIVE DISORDER: ICD-10-CM

## 2019-03-11 DIAGNOSIS — Z63.9 FAMILY PROBLEMS: ICD-10-CM

## 2019-03-11 DIAGNOSIS — F41.1 GAD (GENERALIZED ANXIETY DISORDER): ICD-10-CM

## 2019-03-11 PROCEDURE — 90834 PSYTX W PT 45 MINUTES: CPT | Mod: S$GLB,,, | Performed by: SOCIAL WORKER

## 2019-03-11 PROCEDURE — 90834 PR PSYCHOTHERAPY W/PATIENT, 45 MIN: ICD-10-PCS | Mod: S$GLB,,, | Performed by: SOCIAL WORKER

## 2019-03-11 SDOH — SOCIAL DETERMINANTS OF HEALTH (SDOH): PROBLEM RELATED TO PRIMARY SUPPORT GROUP, UNSPECIFIED: Z63.9

## 2019-03-11 NOTE — PROGRESS NOTES
"  ENCOUNTER DATE: 2/25/19    SITE: [facility]    INSIGHT ORIENTED PSYCHOTHERAPY: 44239 45-50 min    Met with patient .    ENCOUNTER REASON/CHIEF COMPLAINT(symptom, problem, diagnosis, follow-up, reason for encounter):   The patient presents for follow up for coping skills to better manage her anxiety, depression and stress. pt. struggles with self regulation of her emotional states.     INTERVAL EVENTS: Pt was last seen 2 weeks ago for session. Pt presents alone in session. Pt reviews a list of concerns. Pt is noted having  more rapid & slightly pressured speech. Pt reports she has felt more anxiety lately and struggles with not being able to focus on setting and or committing to a more structured schedule for herself.  Pt continues to report she is tired of being on her medication and would prefer to be off of it because she would like to "feel more productive or better".  Clinician continues to advise pt of the dangers of stopping her meds without discussing it with her prescribing provider (Dr. Lourdes Mack). Pt agreed she would not do anything without discussing it with her psychiatrist.  Pt continues discuss her frustration with her daughter and the state of influx her daughter seems to be in her living arrangement with her boyfriend. Pt reports she has been worrying more about everything has had more racing thoughts.  Pt also reports she continues to have regular contact with her friends and is getting to participate in Yoga, Pottery and having regular get together's with her friends.  Pt continues to report to be very involved in her granddaughter's activities with school. Pt reports the arrangement for caring for her mother seems to be working out between pt and her siblings.  Clinician continues to work with pt to reframe concerns in practical ways that can be better addressed to alleviate her concerns.  Clinician advised pt to follow up with her treating psychiatrist to asses medications. Clinician " continues to work with pt on utilizing cbt skills to better manage and tolerate distress and improve interpersonal relationship skills.   Clinician continues to work with pt on self acceptance, being more realistic with her self and others and working on identifying her excessive guilt especially as it relates to her family e.g. Relationship with children.  Clinician continues to challenge pt to work on having more structure in her daily scheduled and to work on being mindful of how much negative thinking she is dwelling on rather engaging in positive activities & focus on the present. Additionally, clinician suggested to pt with  to consider taking a small vacation and taking a break from her stressors with the family. Pt's  was receptive to this decision.  Pt continues to report she has been compliant with medications since her last session.     Mood:less- anxious/ more depressed,  Affect:appropriate to circumstance, TP: circumstantial,tangential ,slight- rapid & pressured speech,  (+)racing thoughts, paranoid thinking, agitation, mood swings,(+) tearful episodes (+) worry,(+) obsessive & intrusive thoughts, ruminates on past events that tend to be negative with her adult children,Sleep: improved, No SI,insight/judgement: fair/improving.  Pt continues to present with automatic negative thoughts about her self continues to have insecurities, ego dystonic and continues to express extreme guilt over her lack of involvement with her mother.  Clinician continues to challenge these negative thoughts, focus on challenge statements, worked on mindfulness become of aware of her distortions that contribute to her feelings anxious and depressed. Pt continues to discuss stress mgmt coping skills, family dynamics. Pt continues to struggle with interpersonal relationships and family dynamics.      TREATMENT PLAN:     No changes in treatment plan from chart note of (date): 1/26/2012    Target Symptoms: Mood  swings,depression, anxiety, irritability,anergiam anhedonia, affective lability, poor interpersonal relationships, co dependent bx's, guilt and racing thoughts, no motivation, frequent tearful episodes, cognitive distortions, paranoid & hopeless.    Therapeutic Intervention type:   Support  Behavior Modification  Medication Mgmt  Why chosen therapy is appropriate versus another modality:  Relevant to diagnosis:evidenced based.  Patient responds to this modality  Outcome monitoring methods:  Self-Report  Observation    RISK PARAMETERS:     Patient reports no suicidal ideation.  Patient reports no homicidal ideation.  Patient reports no self injurious behavior.  Patient reports no violent behavior.    PATIENT'S RESPONSE TO INTERVENTION:   The patient's response to intervention is accepting.    PROGRESS TOWARD GOALS AND OTHER MENTAL STATUS CHANGES:   The patient's progress toward goals is poor.    DIAGNOSIS  Bipolar DO, most recent manic  (296.52)  HUMPHREY (300.02)  Personality NOS (301.89)  Family Circumstance NOS/Caring for an aging parent with dementia (V61.3)    GLOBAL ASSESSMENT OF FUNCTION SCALE:  The patient's GAF is 70    PLAN:   Pt. should start to attend individual therapy once a week.   Medication Management: Pt is currently being followed by Dr. Mack.   See physician notes.  Return to clinic in 2-4 weeks.

## 2019-03-12 NOTE — PROGRESS NOTES
ENCOUNTER DATE: 2/25/19    SITE: [facility]    INSIGHT ORIENTED PSYCHOTHERAPY: 53629 45-50 min    Met with patient .    ENCOUNTER REASON/CHIEF COMPLAINT(symptom, problem, diagnosis, follow-up, reason for encounter):   The patient presents for follow up for coping skills to better manage her anxiety, depression and stress. pt. struggles with self regulation of her emotional states.     INTERVAL EVENTS: Pt was last seen 2 weeks ago for session. Pt presents alone in session. Pt reviews a list of concerns. Pt reports having a difficult meeting with her sister-in-law and her doctors which informed her she was not responding to the chemo treatments and that there are no other options except for experimental drugs. Pt reports she was able to keep it together for the meeting however since then she has been crying and can't seem to stop. Pt reports she is fearful and is worried she will not be able to handle the progressive decline her sister-in-law will have with her cancer. Pt reports she has scheduled another appt with her psychiatrist Dr. Mack to further address her depression symptoms while she is having to manage the care of her sister-in-law and continues to care for her mother 2 days a week. Pt also reports she continues to have regular contact with her friends and is getting to participate in Yoga, Pottery and having regular get together's with her friends.  Pt continues to report to be very involved in her granddaughter's activities with school. Pt reports the arrangement for caring for her mother seems to be working out between pt and her siblings.  Clinician continues to work with pt to reframe concerns in practical ways that can be better addressed to alleviate her concerns. Clinician continues to work with pt on utilizing cbt skills to better manage and tolerate distress and improve interpersonal relationship skills.   Clinician continues to work with pt on self acceptance, being more realistic with her  self and others and working on identifying her excessive guilt especially as it relates to her family e.g. Relationship with children.  Clinician continues to challenge pt to work on having more structure in her daily scheduled and to work on being mindful of how much negative thinking she is dwelling on rather engaging in positive activities & focus on the present. Additionally, clinician suggested to pt with  to consider taking a small vacation and taking a break from her stressors with the family. Pt's  was receptive to this decision.  Pt continues to report she has been compliant with medications since her last session.     Mood:less- anxious/ more depressed,  Affect:appropriate to circumstance, TP: circumstantial,tangential ,slight- rapid & pressured speech,  (+)racing thoughts, paranoid thinking, agitation, mood swings,(+) tearful episodes (+) worry,(+) obsessive & intrusive thoughts, ruminates on past events that tend to be negative with her adult children,Sleep: improved, No SI,insight/judgement: fair/improving.  Pt continues to present with automatic negative thoughts about her self continues to have insecurities, ego dystonic and continues to express extreme guilt over her lack of involvement with her mother.  Clinician continues to challenge these negative thoughts, focus on challenge statements, worked on mindfulness become of aware of her distortions that contribute to her feelings anxious and depressed. Pt continues to discuss stress mgmt coping skills, family dynamics. Pt continues to struggle with interpersonal relationships and family dynamics.      TREATMENT PLAN:     No changes in treatment plan from chart note of (date): 1/26/2012    Target Symptoms: Mood swings,depression, anxiety, irritability,anergiam anhedonia, affective lability, poor interpersonal relationships, co dependent bx's, guilt and racing thoughts, no motivation, frequent tearful episodes, cognitive distortions,  paranoid & hopeless.    Therapeutic Intervention type:   Support  Behavior Modification  Medication Mgmt  Why chosen therapy is appropriate versus another modality:  Relevant to diagnosis:evidenced based.  Patient responds to this modality  Outcome monitoring methods:  Self-Report  Observation    RISK PARAMETERS:     Patient reports no suicidal ideation.  Patient reports no homicidal ideation.  Patient reports no self injurious behavior.  Patient reports no violent behavior.    PATIENT'S RESPONSE TO INTERVENTION:   The patient's response to intervention is accepting.    PROGRESS TOWARD GOALS AND OTHER MENTAL STATUS CHANGES:   The patient's progress toward goals is poor.    DIAGNOSIS  Bipolar DO, most recent manic  (296.52)  HUMPHREY (300.02)  Personality NOS (301.89)  Family Circumstance NOS/Caring for an aging parent with dementia (V61.3)    GLOBAL ASSESSMENT OF FUNCTION SCALE:  The patient's GAF is 70    PLAN:   Pt. should start to attend individual therapy once a week.   Medication Management: Pt is currently being followed by Dr. Mack.   See physician notes.  Return to clinic in 2-4 weeks.

## 2019-04-15 ENCOUNTER — OFFICE VISIT (OUTPATIENT)
Dept: PSYCHIATRY | Facility: CLINIC | Age: 59
End: 2019-04-15
Payer: COMMERCIAL

## 2019-04-15 DIAGNOSIS — Z65.8 INTERPERSONAL PROBLEM: ICD-10-CM

## 2019-04-15 DIAGNOSIS — F33.1 MODERATE EPISODE OF RECURRENT MAJOR DEPRESSIVE DISORDER: ICD-10-CM

## 2019-04-15 DIAGNOSIS — Z63.9 FAMILY PROBLEMS: ICD-10-CM

## 2019-04-15 DIAGNOSIS — F41.1 GAD (GENERALIZED ANXIETY DISORDER): ICD-10-CM

## 2019-04-15 DIAGNOSIS — F31.11 BIPOLAR AFFECTIVE DISORDER, CURRENTLY MANIC, MILD: Primary | ICD-10-CM

## 2019-04-15 PROCEDURE — 90834 PR PSYCHOTHERAPY W/PATIENT, 45 MIN: ICD-10-PCS | Mod: S$GLB,,, | Performed by: SOCIAL WORKER

## 2019-04-15 PROCEDURE — 90834 PSYTX W PT 45 MINUTES: CPT | Mod: S$GLB,,, | Performed by: SOCIAL WORKER

## 2019-04-15 SDOH — SOCIAL DETERMINANTS OF HEALTH (SDOH): PROBLEM RELATED TO PRIMARY SUPPORT GROUP, UNSPECIFIED: Z63.9

## 2019-04-15 NOTE — PROGRESS NOTES
ENCOUNTER DATE: 4/15/19    SITE: [facility]    INSIGHT ORIENTED PSYCHOTHERAPY: 07274 45-50 min    Met with patient .    ENCOUNTER REASON/CHIEF COMPLAINT(symptom, problem, diagnosis, follow-up, reason for encounter):   The patient presents for follow up for coping skills to better manage her anxiety, depression and stress. pt. struggles with self regulation of her emotional states.     INTERVAL EVENTS: Pt was last seen 1 month ago for session. Pt presents alone in session. Pt continues to review a current list of concerns. Pt continues to reports having a very involved role in her sister-in-laws care as she will soon be starting experimental drugs to try to treat her cancer. Pt continues to express great sadness and feelings of guilt surrounding her sister-in-law. Clinician continues to address her misplaced guilt with most areas in her life. Pt presents more hypo- manic in session as evidenced by her talking real fast and with pressured speech. Pt also was restless and could not sit down to talk rather she paced around clinician's office for the meeting. Pt admitted that she has stopped taking some of her meds lamictal and a daily dose of seroquel and notes she is only taking her valium as needed and nightly will take seroquel for sleep. Clinician addressed this decision to stop her medications without discussing it with Dr. Mack was not in her best interest and it is important to contact  and discuss her options. Pt reports feeling like she has too many things going on at once with daily activities, social commitments, caring for her , sister-in-law, mother, and Kaelyn 15yo granddaughter. Clinician encouraged pt to write down a daily task list and not dwell on it rather check her list periodically throughout the day.  Pt also reports she continues to have regular contact with her friends and is getting to participate in Yoga, Pottery and having regular get together's with her friends.  Pt  continues to report to be very involved in her granddaughter's activities with school. Pt reports the arrangement for caring for her mother seems to be working out between pt and her siblings.  Clinician continues to work with pt to reframe concerns in practical ways that can be better addressed to alleviate her concerns. Clinician continues to work with pt on utilizing cbt skills to better manage and tolerate distress and improve interpersonal relationship skills.   Clinician continues to work with pt on self acceptance, being more realistic with her self and others and working on identifying her excessive guilt especially as it relates to her family e.g. Relationship with children.  Clinician continues to challenge pt to work on having more structure in her daily scheduled and to work on being mindful of how much negative thinking she is dwelling on rather engaging in positive activities & focus on the present. Additionally, clinician suggested to pt with  to consider taking a small vacation and taking a break from her stressors with the family. Pt's  was receptive to this decision.  Pt continues to report she has been compliant with medications since her last session.     Mood:less- anxious/ more depressed,  Affect:appropriate to circumstance, TP: circumstantial,tangential ,slight- rapid & pressured speech,  (+)racing thoughts, paranoid thinking, agitation, mood swings,(+) tearful episodes (+) worry,(+) obsessive & intrusive thoughts, ruminates on past events that tend to be negative with her adult children,Sleep: improved, No SI,insight/judgement: fair/improving.  Pt continues to present with automatic negative thoughts about her self continues to have insecurities, ego dystonic and continues to express extreme guilt over her lack of involvement with her mother.  Clinician continues to challenge these negative thoughts, focus on challenge statements, worked on mindfulness become of aware of her  distortions that contribute to her feelings anxious and depressed. Pt continues to discuss stress mgmt coping skills, family dynamics. Pt continues to struggle with interpersonal relationships and family dynamics.      TREATMENT PLAN:     No changes in treatment plan from chart note of (date): 1/26/2012    Target Symptoms: Mood swings,depression, anxiety, irritability,anergiam anhedonia, affective lability, poor interpersonal relationships, co dependent bx's, guilt and racing thoughts, no motivation, frequent tearful episodes, cognitive distortions, paranoid & hopeless.    Therapeutic Intervention type:   Support  Behavior Modification  Medication Mgmt  Why chosen therapy is appropriate versus another modality:  Relevant to diagnosis:evidenced based.  Patient responds to this modality  Outcome monitoring methods:  Self-Report  Observation    RISK PARAMETERS:     Patient reports no suicidal ideation.  Patient reports no homicidal ideation.  Patient reports no self injurious behavior.  Patient reports no violent behavior.    PATIENT'S RESPONSE TO INTERVENTION:   The patient's response to intervention is accepting.    PROGRESS TOWARD GOALS AND OTHER MENTAL STATUS CHANGES:   The patient's progress toward goals is poor.    DIAGNOSIS  Bipolar DO, most recent manic  (296.52)  HUMPHREY (300.02)  Personality NOS (301.89)  Family Circumstance NOS/Caring for an aging parent with dementia (V61.3)    GLOBAL ASSESSMENT OF FUNCTION SCALE:  The patient's GAF is 70    PLAN:   Pt. should start to attend individual therapy once a week.   Medication Management: Pt is currently being followed by Dr. Mack.   See physician notes.  Return to clinic in 2-4 weeks.

## 2019-05-10 ENCOUNTER — TELEPHONE (OUTPATIENT)
Dept: PRIMARY CARE CLINIC | Facility: CLINIC | Age: 59
End: 2019-05-10

## 2019-05-10 DIAGNOSIS — Z00.00 ANNUAL PHYSICAL EXAM: Primary | ICD-10-CM

## 2019-05-10 NOTE — TELEPHONE ENCOUNTER
----- Message from Cherry Kitchen sent at 5/10/2019  8:44 AM CDT -----  Contact: Pt self Mobile [phone] or Home [phone]  Doctor appointment and lab have been scheduled.  Please link lab orders to the lab appointment.  Date of doctor appointment:  07/10/2019  Date of lab appointment:  07/03/2019  Physical or EP: Physical

## 2019-05-13 ENCOUNTER — OFFICE VISIT (OUTPATIENT)
Dept: PSYCHIATRY | Facility: CLINIC | Age: 59
End: 2019-05-13
Payer: COMMERCIAL

## 2019-05-13 DIAGNOSIS — F33.1 MODERATE EPISODE OF RECURRENT MAJOR DEPRESSIVE DISORDER: ICD-10-CM

## 2019-05-13 DIAGNOSIS — Z65.8 INTERPERSONAL PROBLEM: ICD-10-CM

## 2019-05-13 DIAGNOSIS — Z63.9 FAMILY PROBLEMS: ICD-10-CM

## 2019-05-13 DIAGNOSIS — F31.11 BIPOLAR AFFECTIVE DISORDER, CURRENTLY MANIC, MILD: Primary | ICD-10-CM

## 2019-05-13 DIAGNOSIS — F41.1 GAD (GENERALIZED ANXIETY DISORDER): ICD-10-CM

## 2019-05-13 PROCEDURE — 90834 PR PSYCHOTHERAPY W/PATIENT, 45 MIN: ICD-10-PCS | Mod: S$GLB,,, | Performed by: SOCIAL WORKER

## 2019-05-13 PROCEDURE — 90834 PSYTX W PT 45 MINUTES: CPT | Mod: S$GLB,,, | Performed by: SOCIAL WORKER

## 2019-05-13 SDOH — SOCIAL DETERMINANTS OF HEALTH (SDOH): PROBLEM RELATED TO PRIMARY SUPPORT GROUP, UNSPECIFIED: Z63.9

## 2019-05-14 NOTE — PROGRESS NOTES
ENCOUNTER DATE: 5/13/19    SITE: [facility]    INSIGHT ORIENTED PSYCHOTHERAPY: 44904 45-50 min    Met with patient .    ENCOUNTER REASON/CHIEF COMPLAINT(symptom, problem, diagnosis, follow-up, reason for encounter):   The patient presents for follow up for coping skills to better manage her anxiety, depression and stress. pt. struggles with self regulation of her emotional states.     INTERVAL EVENTS: Pt was last seen 1 month ago for session. Pt presents alone in session. Pt reports she did have a recent meeting with her treating psychiatrist and reports a medication change for pt to take her xanax in the am to help with her anxiety symptoms which seem to be more prevalent in the am.  Pt continues to review a current list of concerns. Pt continues to reports having a very involved role in her sister-in-laws care as she will soon be starting experimental drugs to try to treat her cancer. Pt continues to express great sadness and feelings of guilt surrounding her sister-in-law. Clinician continues to address her misplaced guilt with most areas in her life. Pt presents more hypo- manic in session as evidenced by her talking real fast and with pressured speech. Pt reports feeling like she has too many things going on at once with daily activities, social commitments, caring for her , sister-in-law, mother, and Kaelyn 17yo granddaughter. Clinician encouraged pt to write down a daily task list and not dwell on it rather check her list periodically throughout the day.  Pt also reports she continues to have regular contact with her friends and is getting to participate in Yoga, Pottery and having regular get together's with her friends.  Pt continues to report to be very involved in her granddaughter's activities with school. Pt reports the arrangement for caring for her mother seems to be working out between pt and her siblings.  Clinician continues to work with pt to reframe concerns in practical ways that can  be better addressed to alleviate her concerns. Clinician continues to work with pt on utilizing cbt skills to better manage and tolerate distress and improve interpersonal relationship skills.   Clinician continues to work with pt on self acceptance, being more realistic with her self and others and working on identifying her excessive guilt especially as it relates to her family e.g. Relationship with children.  Clinician continues to challenge pt to work on having more structure in her daily scheduled and to work on being mindful of how much negative thinking she is dwelling on rather engaging in positive activities & focus on the present. Additionally, clinician suggested to pt with  to consider taking a small vacation and taking a break from her stressors with the family. Pt's  was receptive to this decision.  Pt continues to report she has been compliant with medications since her last session.     Mood:less- anxious/ more depressed,  Affect:appropriate to circumstance, TP: circumstantial,tangential ,slight- rapid & pressured speech,  (+)racing thoughts, paranoid thinking, agitation, mood swings,(+) tearful episodes (+) worry,(+) obsessive & intrusive thoughts, ruminates on past events that tend to be negative with her adult children,Sleep: improved, No SI,insight/judgement: fair/improving.  Pt continues to present with automatic negative thoughts about her self continues to have insecurities, ego dystonic and continues to express extreme guilt over her lack of involvement with her mother.  Clinician continues to challenge these negative thoughts, focus on challenge statements, worked on mindfulness become of aware of her distortions that contribute to her feelings anxious and depressed. Pt continues to discuss stress mgmt coping skills, family dynamics. Pt continues to struggle with interpersonal relationships and family dynamics.      TREATMENT PLAN:     No changes in treatment plan from chart  note of (date): 1/26/2012    Target Symptoms: Mood swings,depression, anxiety, irritability,anergiam anhedonia, affective lability, poor interpersonal relationships, co dependent bx's, guilt and racing thoughts, no motivation, frequent tearful episodes, cognitive distortions, paranoid & hopeless.    Therapeutic Intervention type:   Support  Behavior Modification  Medication Mgmt  Why chosen therapy is appropriate versus another modality:  Relevant to diagnosis:evidenced based.  Patient responds to this modality  Outcome monitoring methods:  Self-Report  Observation    RISK PARAMETERS:     Patient reports no suicidal ideation.  Patient reports no homicidal ideation.  Patient reports no self injurious behavior.  Patient reports no violent behavior.    PATIENT'S RESPONSE TO INTERVENTION:   The patient's response to intervention is accepting.    PROGRESS TOWARD GOALS AND OTHER MENTAL STATUS CHANGES:   The patient's progress toward goals is poor.    DIAGNOSIS  Bipolar DO, most recent manic  (296.52)  HUMPHREY (300.02)  Personality NOS (301.89)  Family Circumstance NOS/Caring for an aging parent with dementia (V61.3)    GLOBAL ASSESSMENT OF FUNCTION SCALE:  The patient's GAF is 70    PLAN:   Pt. should start to attend individual therapy once a week.   Medication Management: Pt is currently being followed by Dr. Mack.   See physician notes.  Return to clinic in 2-4 weeks.

## 2019-06-10 ENCOUNTER — OFFICE VISIT (OUTPATIENT)
Dept: PSYCHIATRY | Facility: CLINIC | Age: 59
End: 2019-06-10
Payer: COMMERCIAL

## 2019-06-10 DIAGNOSIS — Z65.8 INTERPERSONAL PROBLEM: ICD-10-CM

## 2019-06-10 DIAGNOSIS — F31.11 BIPOLAR AFFECTIVE DISORDER, CURRENTLY MANIC, MILD: Primary | ICD-10-CM

## 2019-06-10 DIAGNOSIS — F33.1 MODERATE EPISODE OF RECURRENT MAJOR DEPRESSIVE DISORDER: ICD-10-CM

## 2019-06-10 DIAGNOSIS — F41.1 GAD (GENERALIZED ANXIETY DISORDER): ICD-10-CM

## 2019-06-10 DIAGNOSIS — Z63.9 FAMILY PROBLEMS: ICD-10-CM

## 2019-06-10 PROCEDURE — 90834 PSYTX W PT 45 MINUTES: CPT | Mod: S$GLB,,, | Performed by: SOCIAL WORKER

## 2019-06-10 PROCEDURE — 90834 PR PSYCHOTHERAPY W/PATIENT, 45 MIN: ICD-10-PCS | Mod: S$GLB,,, | Performed by: SOCIAL WORKER

## 2019-06-10 SDOH — SOCIAL DETERMINANTS OF HEALTH (SDOH): PROBLEM RELATED TO PRIMARY SUPPORT GROUP, UNSPECIFIED: Z63.9

## 2019-06-17 ENCOUNTER — OFFICE VISIT (OUTPATIENT)
Dept: PSYCHIATRY | Facility: CLINIC | Age: 59
End: 2019-06-17
Payer: COMMERCIAL

## 2019-06-17 DIAGNOSIS — Z65.8 INTERPERSONAL PROBLEM: ICD-10-CM

## 2019-06-17 DIAGNOSIS — F41.1 GAD (GENERALIZED ANXIETY DISORDER): ICD-10-CM

## 2019-06-17 DIAGNOSIS — F31.11 BIPOLAR AFFECTIVE DISORDER, CURRENTLY MANIC, MILD: Primary | ICD-10-CM

## 2019-06-17 DIAGNOSIS — Z63.9 FAMILY PROBLEMS: ICD-10-CM

## 2019-06-17 DIAGNOSIS — F33.1 MODERATE EPISODE OF RECURRENT MAJOR DEPRESSIVE DISORDER: ICD-10-CM

## 2019-06-17 PROCEDURE — 90834 PSYTX W PT 45 MINUTES: CPT | Mod: S$GLB,,, | Performed by: SOCIAL WORKER

## 2019-06-17 PROCEDURE — 90834 PR PSYCHOTHERAPY W/PATIENT, 45 MIN: ICD-10-PCS | Mod: S$GLB,,, | Performed by: SOCIAL WORKER

## 2019-06-17 SDOH — SOCIAL DETERMINANTS OF HEALTH (SDOH): PROBLEM RELATED TO PRIMARY SUPPORT GROUP, UNSPECIFIED: Z63.9

## 2019-06-19 NOTE — PROGRESS NOTES
ENCOUNTER DATE: 6/17/19    SITE: [facility]    INSIGHT ORIENTED PSYCHOTHERAPY: [id number] 45-50 min    Met with patient .    ENCOUNTER REASON/CHIEF COMPLAINT(symptom, problem, diagnosis, follow-up, reason for encounter):   The patient presents for follow up for coping skills to better manage her anxiety, depression and stress. pt. struggles with self regulation of her emotional states.     INTERVAL EVENTS: Pt was last seen 1 week ago for session. Pt presents with her  in the session. Pt reports she did not have a recent meeting with her treating psychiatrist (Dr. Mack) to discuss her difficulties she has been having for several weeks. Pt continues to present in an irritable state and reports feeling overwhelmed with everything, pt presents with pressured speech and racing thoughts. Pt reports she has felt like she needs to be busy doing things however she can't seem to start anything then becomes very upset with herself. Clinician inquired with pt if she stopped taking medication or changed her medications since she was last seen she stated she had not.  However her  indicated that he was concerned that she needed to see her treating psychiatrist to review medications assess if changes would be need. Pt and  agreed she would obtain an appt with her doctor asap.   Pt continues to review a current list of concerns. Pt continues to reports having a very involved role in her sister-in-laws care as she will soon be starting experimental drugs to try to treat her cancer. Pt continues to express great sadness and feelings of guilt surrounding her sister-in-law. Clinician continues to address her misplaced guilt with most areas in her life.  Pt reports feeling like she has too many things going on at once with daily activities, social commitments, caring for her , sister-in-law, mother, and Kaelyn 17yo granddaughter. Clinician encouraged pt to write down a daily task list and not dwell on it  rather check her list periodically throughout the day.  Pt also reports she continues to have regular contact with her friends and is getting to participate in Yoga, Pottery and having regular get together's with her friends.  Pt continues to report to be very involved in her granddaughter's activities with school. Pt reports the arrangement for caring for her mother seems to be working out between pt and her siblings.  Clinician continues to work with pt to reframe concerns in practical ways that can be better addressed to alleviate her concerns. Clinician continues to work with pt on utilizing cbt skills to better manage and tolerate distress and improve interpersonal relationship skills.   Clinician continues to work with pt on self acceptance, being more realistic with her self and others and working on identifying her excessive guilt especially as it relates to her family e.g. Relationship with children.  Clinician continues to challenge pt to work on having more structure in her daily scheduled and to work on being mindful of how much negative thinking she is dwelling on rather engaging in positive activities & focus on the present. Additionally, clinician suggested to pt with  to consider taking a small vacation and taking a break from her stressors with the family. Pt's  was receptive to this decision.  Pt continues to report she has been compliant with medications since her last session.     Mood:less- highyanxious/irritable,  Affect:appropriate to circumstance, TP: circumstantial,tangential ,slight- rapid & pressured speech,  (+)racing thoughts, paranoid thinking, agitation, mood swings,(+) tearful episodes (+) worry,(+) obsessive & intrusive thoughts, ruminates on past events that tend to be negative with her adult children,Sleep: improved, No SI,insight/judgement: fair/improving.  Pt continues to present with automatic negative thoughts about her self continues to have insecurities, ego  dystonic and continues to express extreme guilt over her lack of involvement with her mother.  Clinician continues to challenge these negative thoughts, focus on challenge statements, worked on mindfulness become of aware of her distortions that contribute to her feelings anxious and depressed. Pt continues to discuss stress mgmt coping skills, family dynamics. Pt continues to struggle with interpersonal relationships and family dynamics.      TREATMENT PLAN:     No changes in treatment plan from chart note of (date): 1/26/2012    Target Symptoms: Mood swings,depression, anxiety, irritability,anergiam anhedonia, affective lability, poor interpersonal relationships, co dependent bx's, guilt and racing thoughts, no motivation, frequent tearful episodes, cognitive distortions, paranoid & hopeless.    Therapeutic Intervention type:   Support  Behavior Modification  Medication Mgmt  Why chosen therapy is appropriate versus another modality:  Relevant to diagnosis:evidenced based.  Patient responds to this modality  Outcome monitoring methods:  Self-Report  Observation    RISK PARAMETERS:     Patient reports no suicidal ideation.  Patient reports no homicidal ideation.  Patient reports no self injurious behavior.  Patient reports no violent behavior.    PATIENT'S RESPONSE TO INTERVENTION:   The patient's response to intervention is accepting.    PROGRESS TOWARD GOALS AND OTHER MENTAL STATUS CHANGES:   The patient's progress toward goals is poor.    DIAGNOSIS  Bipolar DO, most recent manic  (296.52)  HUMPHREY (300.02)  Personality NOS (301.89)  Family Circumstance NOS/Caring for an aging parent with dementia (V61.3)    GLOBAL ASSESSMENT OF FUNCTION SCALE:  The patient's GAF is 70    PLAN:   Pt. should start to attend individual therapy once a week.   Medication Management: Pt is currently being followed by Dr. Mack.   See physician notes.  Return to clinic in 2-4 weeks.

## 2019-06-26 ENCOUNTER — PATIENT OUTREACH (OUTPATIENT)
Dept: ADMINISTRATIVE | Facility: HOSPITAL | Age: 59
End: 2019-06-26

## 2019-07-03 ENCOUNTER — LAB VISIT (OUTPATIENT)
Dept: LAB | Facility: HOSPITAL | Age: 59
End: 2019-07-03
Attending: FAMILY MEDICINE
Payer: COMMERCIAL

## 2019-07-03 DIAGNOSIS — Z00.00 ANNUAL PHYSICAL EXAM: ICD-10-CM

## 2019-07-03 LAB
ALBUMIN SERPL BCP-MCNC: 4 G/DL (ref 3.5–5.2)
ALP SERPL-CCNC: 93 U/L (ref 55–135)
ALT SERPL W/O P-5'-P-CCNC: 16 U/L (ref 10–44)
ANION GAP SERPL CALC-SCNC: 9 MMOL/L (ref 8–16)
AST SERPL-CCNC: 17 U/L (ref 10–40)
BASOPHILS # BLD AUTO: 0.03 K/UL (ref 0–0.2)
BASOPHILS NFR BLD: 0.5 % (ref 0–1.9)
BILIRUB SERPL-MCNC: 0.6 MG/DL (ref 0.1–1)
BUN SERPL-MCNC: 13 MG/DL (ref 8–23)
CALCIUM SERPL-MCNC: 10.6 MG/DL (ref 8.7–10.5)
CHLORIDE SERPL-SCNC: 104 MMOL/L (ref 95–110)
CHOLEST SERPL-MCNC: 259 MG/DL (ref 120–199)
CHOLEST/HDLC SERPL: 3.7 {RATIO} (ref 2–5)
CO2 SERPL-SCNC: 28 MMOL/L (ref 23–29)
CREAT SERPL-MCNC: 1.1 MG/DL (ref 0.5–1.4)
DIFFERENTIAL METHOD: ABNORMAL
EOSINOPHIL # BLD AUTO: 0.1 K/UL (ref 0–0.5)
EOSINOPHIL NFR BLD: 0.8 % (ref 0–8)
ERYTHROCYTE [DISTWIDTH] IN BLOOD BY AUTOMATED COUNT: 13.1 % (ref 11.5–14.5)
EST. GFR  (AFRICAN AMERICAN): >60 ML/MIN/1.73 M^2
EST. GFR  (NON AFRICAN AMERICAN): 54.3 ML/MIN/1.73 M^2
GLUCOSE SERPL-MCNC: 98 MG/DL (ref 70–110)
HCT VFR BLD AUTO: 39.5 % (ref 37–48.5)
HDLC SERPL-MCNC: 70 MG/DL (ref 40–75)
HDLC SERPL: 27 % (ref 20–50)
HGB BLD-MCNC: 12.7 G/DL (ref 12–16)
IMM GRANULOCYTES # BLD AUTO: 0.02 K/UL (ref 0–0.04)
IMM GRANULOCYTES NFR BLD AUTO: 0.3 % (ref 0–0.5)
LDLC SERPL CALC-MCNC: 169.8 MG/DL (ref 63–159)
LYMPHOCYTES # BLD AUTO: 2.3 K/UL (ref 1–4.8)
LYMPHOCYTES NFR BLD: 37.2 % (ref 18–48)
MCH RBC QN AUTO: 33 PG (ref 27–31)
MCHC RBC AUTO-ENTMCNC: 32.2 G/DL (ref 32–36)
MCV RBC AUTO: 103 FL (ref 82–98)
MONOCYTES # BLD AUTO: 0.6 K/UL (ref 0.3–1)
MONOCYTES NFR BLD: 9.5 % (ref 4–15)
NEUTROPHILS # BLD AUTO: 3.2 K/UL (ref 1.8–7.7)
NEUTROPHILS NFR BLD: 51.7 % (ref 38–73)
NONHDLC SERPL-MCNC: 189 MG/DL
NRBC BLD-RTO: 0 /100 WBC
PLATELET # BLD AUTO: 321 K/UL (ref 150–350)
PMV BLD AUTO: 10.3 FL (ref 9.2–12.9)
POTASSIUM SERPL-SCNC: 4 MMOL/L (ref 3.5–5.1)
PROT SERPL-MCNC: 7.5 G/DL (ref 6–8.4)
RBC # BLD AUTO: 3.85 M/UL (ref 4–5.4)
SODIUM SERPL-SCNC: 141 MMOL/L (ref 136–145)
TRIGL SERPL-MCNC: 96 MG/DL (ref 30–150)
WBC # BLD AUTO: 6.23 K/UL (ref 3.9–12.7)

## 2019-07-03 PROCEDURE — 36415 COLL VENOUS BLD VENIPUNCTURE: CPT | Mod: PN

## 2019-07-03 PROCEDURE — 80053 COMPREHEN METABOLIC PANEL: CPT

## 2019-07-03 PROCEDURE — 80061 LIPID PANEL: CPT

## 2019-07-03 PROCEDURE — 85025 COMPLETE CBC W/AUTO DIFF WBC: CPT

## 2019-07-05 NOTE — PROGRESS NOTES
ENCOUNTER DATE: 7/23/18    SITE: [facility]    INSIGHT ORIENTED PSYCHOTHERAPY: 08333 45-50 min    Met with patient .    ENCOUNTER REASON/CHIEF COMPLAINT(symptom, problem, diagnosis, follow-up, reason for encounter):   The patient presents for follow up for coping skills to better manage her anxiety, depression and stress. pt. struggles with self regulation of her emotional states.     INTERVAL EVENTS: Pt was last seen 3 weeks ago for session. Pt continues to discuss her feelings about her sister-in-law who is undergoing treatment for stage iv colan cancer. Pt continues to report she is having a hard time watching her sister-in-law go through her treatments.  Pt also reports she continues to have regular contact with her friends and is getting to participate in Yoga, Pottery and having regular get together's with her friends.  Pt reports she continues to be very involved in her granddaughter's activities with school and now with softball. Pt reports her granddaughter's father's hearing has been postponed. Pt reports she is happy to see her granddaughter's father is doing a lot more with her seems to be making all events etc. Pt reports the new arrangement for caring for her mother seems to be working out between pt and her siblings.   Pt reports she continues to worry about the future with her  and how she will manage to learn to handle bills and finances when eventually her 's eyesight will be completely gone. Pt discussed in detail her fears of technology and her fear of learning how to manage finances on her computer. Clinician explored how pt could take an intro course on how to use a computer, purchase a simple laptop to learn on.  Clinician continues to work with pt to reframe concerns in practical ways that can be better addressed to alleviate her concerns. Clinician continues to work with pt on utilizing cbt skills to better manage and tolerate distress and improve interpersonal relationship  skills.  Pt reports to continue medication regimen as prescribed.  Clinician continues to work with pt on self acceptance, being more realistic with her self and others and working on identifying her excessive guilt especially as it relates to her family e.g. Relationship with children.  Clinician continues to challenge pt to work on having more structure in her daily scheduled and to work on being mindful of how much negative thinking she is dwelling on rather engaging in positive activities & focus on the present. Pt continues to report she has been compliant with medications since her last session.     Mood:less- anxious,  Affect:appropriate to circumstance, TP: circumstantial,tangential ,slight- rapid & pressured speech,  (+)racing thoughts, paranoid thinking, agitation, mood swings,(+) tearful episodes (+) worry,(+) obsessive & intrusive thoughts, ruminates on past events that tend to be negative with her adult children,Sleep: improved, No SI,insight/judgement: fair/improving.  Pt continues to present with automatic negative thoughts about her self continues to have insecurities, ego dystonic and continues to express extreme guilt over her lack of involvement with her mother.  Clinician continues to challenge these negative thoughts, focus on challenge statements, worked on mindfulness become of aware of her distortions that contribute to her feelings anxious and depressed. Pt continues to discuss stress mgmt coping skills, family dynamics. Pt continues to struggle with interpersonal relationships and family dynamics.      TREATMENT PLAN:     No changes in treatment plan from chart note of (date): 1/26/2012    Target Symptoms: Mood swings,depression, anxiety, irritability,anergiam anhedonia, affective lability, poor interpersonal relationships, co dependent bx's, guilt and racing thoughts, no motivation, frequent tearful episodes, cognitive distortions, paranoid & hopeless.    Therapeutic Intervention type:    Support  Behavior Modification  Medication Mgmt  Why chosen therapy is appropriate versus another modality:  Relevant to diagnosis:evidenced based.  Patient responds to this modality  Outcome monitoring methods:  Self-Report  Observation    RISK PARAMETERS:     Patient reports no suicidal ideation.  Patient reports no homicidal ideation.  Patient reports no self injurious behavior.  Patient reports no violent behavior.    PATIENT'S RESPONSE TO INTERVENTION:   The patient's response to intervention is accepting.    PROGRESS TOWARD GOALS AND OTHER MENTAL STATUS CHANGES:   The patient's progress toward goals is poor.    DIAGNOSIS  Bipolar DO, most recent manic  (296.52)  HUMPHREY (300.02)  Personality NOS (301.89)  Family Circumstance NOS/Caring for an aging parent with dementia (V61.3)    GLOBAL ASSESSMENT OF FUNCTION SCALE:  The patient's GAF is 70    PLAN:   Pt. should start to attend individual therapy once a week.   Medication Management: Pt is currently being followed by Dr. Mack.   See physician notes.  Return to clinic in 2-4 weeks.

## 2019-07-10 ENCOUNTER — OFFICE VISIT (OUTPATIENT)
Dept: PRIMARY CARE CLINIC | Facility: CLINIC | Age: 59
End: 2019-07-10
Payer: COMMERCIAL

## 2019-07-10 VITALS
BODY MASS INDEX: 22.27 KG/M2 | DIASTOLIC BLOOD PRESSURE: 76 MMHG | HEART RATE: 80 BPM | HEIGHT: 63 IN | SYSTOLIC BLOOD PRESSURE: 102 MMHG | WEIGHT: 125.69 LBS | TEMPERATURE: 98 F

## 2019-07-10 DIAGNOSIS — Z00.00 ROUTINE GENERAL MEDICAL EXAMINATION AT A HEALTH CARE FACILITY: Primary | ICD-10-CM

## 2019-07-10 DIAGNOSIS — R47.89 WORD FINDING DIFFICULTY: ICD-10-CM

## 2019-07-10 DIAGNOSIS — F41.1 GAD (GENERALIZED ANXIETY DISORDER): ICD-10-CM

## 2019-07-10 DIAGNOSIS — E78.2 MIXED HYPERLIPIDEMIA: ICD-10-CM

## 2019-07-10 DIAGNOSIS — R29.818 NEUROLOGICAL DEFICIT PRESENT: ICD-10-CM

## 2019-07-10 DIAGNOSIS — I47.10 SVT (SUPRAVENTRICULAR TACHYCARDIA): ICD-10-CM

## 2019-07-10 DIAGNOSIS — F33.1 MODERATE EPISODE OF RECURRENT MAJOR DEPRESSIVE DISORDER: ICD-10-CM

## 2019-07-10 PROCEDURE — 99999 PR PBB SHADOW E&M-EST. PATIENT-LVL III: ICD-10-PCS | Mod: PBBFAC,,, | Performed by: FAMILY MEDICINE

## 2019-07-10 PROCEDURE — 99396 PR PREVENTIVE VISIT,EST,40-64: ICD-10-PCS | Mod: S$GLB,,, | Performed by: FAMILY MEDICINE

## 2019-07-10 PROCEDURE — 99396 PREV VISIT EST AGE 40-64: CPT | Mod: S$GLB,,, | Performed by: FAMILY MEDICINE

## 2019-07-10 PROCEDURE — 99999 PR PBB SHADOW E&M-EST. PATIENT-LVL III: CPT | Mod: PBBFAC,,, | Performed by: FAMILY MEDICINE

## 2019-07-10 RX ORDER — ROSUVASTATIN CALCIUM 10 MG/1
10 TABLET, COATED ORAL DAILY
Qty: 90 TABLET | Refills: 3 | Status: SHIPPED | OUTPATIENT
Start: 2019-07-10 | End: 2020-07-06

## 2019-07-10 RX ORDER — QUETIAPINE 200 MG/1
600 TABLET, FILM COATED, EXTENDED RELEASE ORAL NIGHTLY
Refills: 1 | COMMUNITY
Start: 2019-06-21 | End: 2020-02-10 | Stop reason: SDUPTHER

## 2019-07-10 NOTE — PROGRESS NOTES
Subjective:      Patient ID: Kimberly Jo is a 59 y.o. female.    Chief Complaint: Annual Exam    Here today for general exam.     She follows with Dr Mack, psychiatrist for severe anxiety.  She feels overwhelmed caring for her sister-in-law who has cancer and driving her to chemotherapy appointments, driving her  around, care for her mother who has Alzheimer's, caring for her grandchild.  She is clenching her teeth at night and waking up with some jaw pain.  She had shingles in the past and feels she may benefit from this vaccine.    She is asking if she cough possibly could have had a stroke before, as she has difficulty with word finding. Does not become disoriented, denies any nausea vomiting, no difficulty with balance, no vision changes.  She did have SVT treated with adenosine by EMT in 2016.  It was isolated so she did not need any ongoing treatment.  She states the symptoms of word-finding have been persistent since then.  She is asking if she could have imaging to look for a stroke.    She has always been reluctant to try medication for elevated cholesterol, but is afraid of a heart attack or stroke    Denies any chest pain, shortness of breath, nausea vomiting constipation diarrhea, blood in stool, heartburn    The 10-year ASCVD risk score (Reanna DC Jr., et al., 2013) is: 2.5%    Values used to calculate the score:      Age: 59 years      Sex: Female      Is Non- : No      Diabetic: No      Tobacco smoker: No      Systolic Blood Pressure: 102 mmHg      Is BP treated: No      HDL Cholesterol: 70 mg/dL      Total Cholesterol: 259 mg/dL        Current Outpatient Medications:     biotin 2,500 mcg Tab, Take 50,000 mcg by mouth once daily., Disp: , Rfl:     cholecalciferol, vitamin D3, (VITAMIN D3) 2,000 unit Tab, Take by mouth once daily., Disp: , Rfl:     diazePAM (VALIUM) 5 MG tablet, Take 5 mg by mouth every 6 (six) hours as needed. , Disp: , Rfl:     lamotrigine  (LAMICTAL) 200 MG tablet, 2 (two) times daily. , Disp: , Rfl:     quetiapine 200 mg oral tb24 (SEROQUEL XR) 200 MG Tb24, Take 600 mg by mouth every evening., Disp: , Rfl: 1    rosuvastatin (CRESTOR) 10 MG tablet, Take 1 tablet (10 mg total) by mouth once daily., Disp: 90 tablet, Rfl: 3    varicella-zoster gE-AS01B, PF, (SHINGRIX, PF,) 50 mcg/0.5 mL injection, Inject into the muscle., Disp: 1 each, Rfl: 0    Lab Results   Component Value Date    HGBA1C 5.4 06/15/2017     No results found for: MICALBCREAT  Lab Results   Component Value Date    LDLCALC 169.8 (H) 07/03/2019    LDLCALC 160 (H) 09/24/2018    CHOL 259 (H) 07/03/2019    HDL 70 07/03/2019    TRIG 96 07/03/2019       Lab Results   Component Value Date     07/03/2019    K 4.0 07/03/2019     07/03/2019    CO2 28 07/03/2019    GLU 98 07/03/2019    BUN 13 07/03/2019    CREATININE 1.1 07/03/2019    CALCIUM 10.6 (H) 07/03/2019    PROT 7.5 07/03/2019    ALBUMIN 4.0 07/03/2019    BILITOT 0.6 07/03/2019    ALKPHOS 93 07/03/2019    AST 17 07/03/2019    ALT 16 07/03/2019    ANIONGAP 9 07/03/2019    ESTGFRAFRICA >60.0 07/03/2019    EGFRNONAA 54.3 (A) 07/03/2019    WBC 6.23 07/03/2019    HGB 12.7 07/03/2019    HGB 14.2 06/15/2017    HCT 39.5 07/03/2019     (H) 07/03/2019     07/03/2019    TSH 0.69 09/24/2018    HEPCAB Negative 06/09/2016    CCUOMWSQ28LG 47 09/24/2018    AMRUDDDM26RD 30 12/04/2015       Past Medical History:   Diagnosis Date    Depression     Dr Lourdes Mack & therapist Jaziel    History of herpes zoster 06/21/2017    tx GYN    Mixed hyperlipidemia 2/2/2016    Moderate episode of recurrent major depressive disorder 8/16/2016    Slow transit constipation 2/2/2016    CS 50 yo    SVT (supraventricular tachycardia)     2015 ablated with Adenosine EMT (too much caffeine), cardiologist rec bblocker if it recurrs    Vitamin D deficiency     2015 OTC suppl    Word finding difficulty 7/10/2019     No past surgical history on  "file.  Social History     Social History Narrative    Homemaker, enjoys yoga,  Jimbo with glaucoma, 2 children, nonsmoker, ETOH socially, GYN here & [place], neg HPV 2018, repeat 2021,  normal colonoscopy 50 yo per pt with doctor on [place]     Family History   Problem Relation Age of Onset    Melanoma Father     Cancer Father     Heart disease Brother 28         "blood clot" after injury hit head    Hypertension Mother     Alzheimer's disease Mother     Breast cancer Neg Hx     Colon cancer Neg Hx     Ovarian cancer Neg Hx      Vitals:    07/10/19 1429   BP: 102/76   Pulse: 80   Temp: 98.1 °F (36.7 °C)   Weight: 57 kg (125 lb 10.6 oz)   Height: 5' 3" (1.6 m)     Objective:   Physical Exam   Constitutional: She is oriented to person, place, and time. She appears well-developed and well-nourished.   HENT:   Head: Normocephalic and atraumatic.   Right Ear: External ear normal.   Left Ear: External ear normal.   Nose: Nose normal.   Mouth/Throat: Oropharynx is clear and moist.   Tender bilateral masseter muscles , tender bilateral TMJ   Eyes: Pupils are equal, round, and reactive to light. EOM are normal.   Neck: Neck supple. No thyromegaly present.   Cardiovascular: Normal rate, regular rhythm, normal heart sounds and intact distal pulses.   No murmur heard.  Pulmonary/Chest: Effort normal and breath sounds normal. She has no wheezes.   Abdominal: Soft. Bowel sounds are normal. She exhibits no distension and no mass. There is no tenderness. There is no rebound and no guarding.   Musculoskeletal: She exhibits no edema.   Lymphadenopathy:     She has no cervical adenopathy.   Neurological: She is alert and oriented to person, place, and time.     Cranial nerves III through XII grossly intact, neck is supple, Nontender Cervical spine,  Can touch chin to  chest and to both shoulders     Skin: Skin is warm and dry.   Psychiatric: Her behavior is normal.   anxious     Assessment:     1. Routine general medical " examination at a health care facility    2. Moderate episode of recurrent major depressive disorder    3. HUMPHREY (generalized anxiety disorder)    4. Mixed hyperlipidemia    5. SVT (supraventricular tachycardia)    6. Word finding difficulty    7. Neurological deficit present      Plan:     Orders Placed This Encounter    MRI Brain Without Contrast    rosuvastatin (CRESTOR) 10 MG tablet       Patient Instructions   Due for  Vaccines  - shingles    If her symtoms continue with difficulty finding words, let me know & I can refer to Neurologist. She is concerned about possible stroke in 2016 with episode of SVT    Follow with psychiatrist and therapist  ==============================================================  I'd like to advise you on current CANCER SCREENING recommendations:  ~~~~~~~~~~~~~~~~~~~~~~~~~~~~~~~~~~~~~~~~~~~~~~~~~~~~~~~~~~~~~  PAP SMEAR to evaluate for cervical cancer screening  Every 3 years (or 30 - 66 yo, every 5 years with HPV co-testing).   MAMMOGRAM  every 1-2 years, from 50 - 74 years old. We can discuss your risk at 40 & determine whether to get mammogram sooner  Of course, I can perform this sooner if there is family history of cancer, or if you have problems or questions    ==============================  RECOMMENDATIONS FOR FEMALES  ==============================  Your #1 MEDICINE is DAILY EXERCISE - 15-20 minutes of huffing & puffing EVERY DAY.     Prevent the #1 cause of death- cardiovascular disease (HEART ATTACK & STROKE) by checking for normal blood pressure, cholesterol, sugars, & by not smoking.     VACCINES: Yearly FLU shot, PNEUMONIA shot after 65,  SHINGLES shot after 50    Screening colonoscopy at AGE  50 & every 10 years to check for COLON CANCER,  one of the most common & preventable cancers (Or FIT kit yearly) Repeat in 3 years if POLYP found     I recommend  high fiber (5 fresh fruits or vegetables daily), low fat diet and aerobic  exercise (huffing/ puffing/ sweating for 20  min straight at least 4 days a week)    Follow up yearly with mammogram, fasting lipids, CMP, CBC prior.   ==============================================================

## 2019-07-10 NOTE — PATIENT INSTRUCTIONS
Due for  Vaccines  - shingles    If her symtoms continue with difficulty finding words, let me know & I can refer to Neurologist. She is concerned about possible stroke in 2016 with episode of SVT    Follow with psychiatrist and therapist  ==============================================================  I'd like to advise you on current CANCER SCREENING recommendations:  ~~~~~~~~~~~~~~~~~~~~~~~~~~~~~~~~~~~~~~~~~~~~~~~~~~~~~~~~~~~~~  PAP SMEAR to evaluate for cervical cancer screening  Every 3 years (or 30 - 64 yo, every 5 years with HPV co-testing).   MAMMOGRAM  every 1-2 years, from 50 - 74 years old. We can discuss your risk at 40 & determine whether to get mammogram sooner  Of course, I can perform this sooner if there is family history of cancer, or if you have problems or questions    ==============================  RECOMMENDATIONS FOR FEMALES  ==============================  Your #1 MEDICINE is DAILY EXERCISE - 15-20 minutes of huffing & puffing EVERY DAY.     Prevent the #1 cause of death- cardiovascular disease (HEART ATTACK & STROKE) by checking for normal blood pressure, cholesterol, sugars, & by not smoking.     VACCINES: Yearly FLU shot, PNEUMONIA shot after 65,  SHINGLES shot after 50    Screening colonoscopy at AGE  50 & every 10 years to check for COLON CANCER,  one of the most common & preventable cancers (Or FIT kit yearly) Repeat in 3 years if POLYP found     I recommend  high fiber (5 fresh fruits or vegetables daily), low fat diet and aerobic  exercise (huffing/ puffing/ sweating for 20 min straight at least 4 days a week)    Follow up yearly with mammogram, fasting lipids, CMP, CBC prior.   ==============================================================

## 2019-07-11 ENCOUNTER — TELEPHONE (OUTPATIENT)
Dept: PRIMARY CARE CLINIC | Facility: CLINIC | Age: 59
End: 2019-07-11

## 2019-07-12 ENCOUNTER — OFFICE VISIT (OUTPATIENT)
Dept: PSYCHIATRY | Facility: CLINIC | Age: 59
End: 2019-07-12
Payer: COMMERCIAL

## 2019-07-12 DIAGNOSIS — R47.89 WORD FINDING DIFFICULTY: ICD-10-CM

## 2019-07-12 DIAGNOSIS — F31.62 BIPOLAR DISORDER, CURRENT EPISODE MIXED, MODERATE: Primary | ICD-10-CM

## 2019-07-12 DIAGNOSIS — Z63.9 FAMILY PROBLEMS: ICD-10-CM

## 2019-07-12 DIAGNOSIS — F33.1 MODERATE EPISODE OF RECURRENT MAJOR DEPRESSIVE DISORDER: ICD-10-CM

## 2019-07-12 DIAGNOSIS — F41.1 GAD (GENERALIZED ANXIETY DISORDER): ICD-10-CM

## 2019-07-12 DIAGNOSIS — Z65.8 INTERPERSONAL PROBLEM: ICD-10-CM

## 2019-07-12 PROCEDURE — 90834 PSYTX W PT 45 MINUTES: CPT | Mod: S$GLB,,, | Performed by: SOCIAL WORKER

## 2019-07-12 PROCEDURE — 90834 PR PSYCHOTHERAPY W/PATIENT, 45 MIN: ICD-10-PCS | Mod: S$GLB,,, | Performed by: SOCIAL WORKER

## 2019-07-12 SDOH — SOCIAL DETERMINANTS OF HEALTH (SDOH): PROBLEM RELATED TO PRIMARY SUPPORT GROUP, UNSPECIFIED: Z63.9
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
## 2019-07-22 ENCOUNTER — TELEPHONE (OUTPATIENT)
Dept: PRIMARY CARE CLINIC | Facility: CLINIC | Age: 59
End: 2019-07-22

## 2019-07-22 NOTE — TELEPHONE ENCOUNTER
----- Message from Roxi Ayon sent at 7/22/2019  1:17 PM CDT -----  Contact: self   Patient is calling about the MRI orders. Patient stated she was waiting for a call back from the doctor about the MRI being covered by her insurance. Please call & advise

## 2019-07-26 ENCOUNTER — HOSPITAL ENCOUNTER (OUTPATIENT)
Dept: RADIOLOGY | Facility: HOSPITAL | Age: 59
Discharge: HOME | End: 2019-07-26
Attending: FAMILY MEDICINE
Payer: COMMERCIAL

## 2019-07-26 DIAGNOSIS — R47.89 WORD FINDING DIFFICULTY: ICD-10-CM

## 2019-07-26 DIAGNOSIS — R29.818 NEUROLOGICAL DEFICIT PRESENT: ICD-10-CM

## 2019-07-26 DIAGNOSIS — I47.10 SVT (SUPRAVENTRICULAR TACHYCARDIA): ICD-10-CM

## 2019-07-26 PROCEDURE — 70551 MRI BRAIN STEM W/O DYE: CPT | Mod: 26,,, | Performed by: RADIOLOGY

## 2019-07-26 PROCEDURE — 70551 MRI BRAIN WITHOUT CONTRAST: ICD-10-PCS | Mod: 26,,, | Performed by: RADIOLOGY

## 2019-07-26 PROCEDURE — 70551 MRI BRAIN STEM W/O DYE: CPT | Mod: TC

## 2019-07-27 ENCOUNTER — TELEPHONE (OUTPATIENT)
Dept: PRIMARY CARE CLINIC | Facility: CLINIC | Age: 59
End: 2019-07-27

## 2019-07-27 DIAGNOSIS — F31.62 BIPOLAR DISORDER, CURRENT EPISODE MIXED, MODERATE: ICD-10-CM

## 2019-07-27 DIAGNOSIS — F33.1 MODERATE EPISODE OF RECURRENT MAJOR DEPRESSIVE DISORDER: ICD-10-CM

## 2019-07-27 DIAGNOSIS — F41.1 GAD (GENERALIZED ANXIETY DISORDER): ICD-10-CM

## 2019-07-27 DIAGNOSIS — R47.89 WORD FINDING DIFFICULTY: Primary | ICD-10-CM

## 2019-07-27 NOTE — TELEPHONE ENCOUNTER
I spoke with pt concerning normal MRI brain    She would like referral to Neurologist to further evaluated her word finding difficulty.     Referral in

## 2019-08-05 ENCOUNTER — OFFICE VISIT (OUTPATIENT)
Dept: PSYCHIATRY | Facility: CLINIC | Age: 59
End: 2019-08-05
Payer: COMMERCIAL

## 2019-08-05 DIAGNOSIS — F41.1 GAD (GENERALIZED ANXIETY DISORDER): ICD-10-CM

## 2019-08-05 DIAGNOSIS — Z65.8 INTERPERSONAL PROBLEM: ICD-10-CM

## 2019-08-05 DIAGNOSIS — Z63.9 FAMILY PROBLEMS: ICD-10-CM

## 2019-08-05 DIAGNOSIS — F33.1 MODERATE EPISODE OF RECURRENT MAJOR DEPRESSIVE DISORDER: ICD-10-CM

## 2019-08-05 DIAGNOSIS — F31.62 BIPOLAR DISORDER, CURRENT EPISODE MIXED, MODERATE: Primary | ICD-10-CM

## 2019-08-05 PROCEDURE — 90834 PSYTX W PT 45 MINUTES: CPT | Mod: S$GLB,,, | Performed by: SOCIAL WORKER

## 2019-08-05 PROCEDURE — 90834 PR PSYCHOTHERAPY W/PATIENT, 45 MIN: ICD-10-PCS | Mod: S$GLB,,, | Performed by: SOCIAL WORKER

## 2019-08-05 SDOH — SOCIAL DETERMINANTS OF HEALTH (SDOH): PROBLEM RELATED TO PRIMARY SUPPORT GROUP, UNSPECIFIED: Z63.9
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
## 2019-08-26 ENCOUNTER — OFFICE VISIT (OUTPATIENT)
Dept: PSYCHIATRY | Facility: CLINIC | Age: 59
End: 2019-08-26
Payer: COMMERCIAL

## 2019-08-26 DIAGNOSIS — F33.1 MODERATE EPISODE OF RECURRENT MAJOR DEPRESSIVE DISORDER: ICD-10-CM

## 2019-08-26 DIAGNOSIS — Z63.9 FAMILY PROBLEMS: ICD-10-CM

## 2019-08-26 DIAGNOSIS — Z65.8 INTERPERSONAL PROBLEM: ICD-10-CM

## 2019-08-26 DIAGNOSIS — F31.30 BIPOLAR AFFECTIVE DISORDER, CURRENT EPISODE DEPRESSED, CURRENT EPISODE SEVERITY UNSPECIFIED: Primary | ICD-10-CM

## 2019-08-26 DIAGNOSIS — F41.1 GAD (GENERALIZED ANXIETY DISORDER): ICD-10-CM

## 2019-08-26 PROCEDURE — 90834 PSYTX W PT 45 MINUTES: CPT | Mod: S$GLB,,, | Performed by: SOCIAL WORKER

## 2019-08-26 PROCEDURE — 90834 PR PSYCHOTHERAPY W/PATIENT, 45 MIN: ICD-10-PCS | Mod: S$GLB,,, | Performed by: SOCIAL WORKER

## 2019-08-26 SDOH — SOCIAL DETERMINANTS OF HEALTH (SDOH): PROBLEM RELATED TO PRIMARY SUPPORT GROUP, UNSPECIFIED: Z63.9
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
## 2019-09-16 ENCOUNTER — OFFICE VISIT (OUTPATIENT)
Dept: PSYCHIATRY | Facility: CLINIC | Age: 59
End: 2019-09-16
Payer: COMMERCIAL

## 2019-09-16 DIAGNOSIS — F33.1 MODERATE EPISODE OF RECURRENT MAJOR DEPRESSIVE DISORDER: ICD-10-CM

## 2019-09-16 DIAGNOSIS — Z63.9 FAMILY PROBLEMS: ICD-10-CM

## 2019-09-16 DIAGNOSIS — F41.1 GAD (GENERALIZED ANXIETY DISORDER): ICD-10-CM

## 2019-09-16 DIAGNOSIS — F31.31 BIPOLAR AFFECTIVE DISORDER, CURRENTLY DEPRESSED, MILD: Primary | ICD-10-CM

## 2019-09-16 DIAGNOSIS — Z65.8 INTERPERSONAL PROBLEM: ICD-10-CM

## 2019-09-16 PROCEDURE — 90834 PR PSYCHOTHERAPY W/PATIENT, 45 MIN: ICD-10-PCS | Mod: S$GLB,,, | Performed by: SOCIAL WORKER

## 2019-09-16 PROCEDURE — 90834 PSYTX W PT 45 MINUTES: CPT | Mod: S$GLB,,, | Performed by: SOCIAL WORKER

## 2019-09-16 SDOH — SOCIAL DETERMINANTS OF HEALTH (SDOH): PROBLEM RELATED TO PRIMARY SUPPORT GROUP, UNSPECIFIED: Z63.9
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
## 2019-09-22 ENCOUNTER — PATIENT OUTREACH (OUTPATIENT)
Dept: ADMINISTRATIVE | Facility: OTHER | Age: 59
End: 2019-09-22

## 2019-09-23 ENCOUNTER — OFFICE VISIT (OUTPATIENT)
Dept: OBSTETRICS AND GYNECOLOGY | Facility: CLINIC | Age: 59
End: 2019-09-23
Payer: COMMERCIAL

## 2019-09-23 VITALS
DIASTOLIC BLOOD PRESSURE: 60 MMHG | HEIGHT: 63 IN | SYSTOLIC BLOOD PRESSURE: 104 MMHG | WEIGHT: 125.44 LBS | BODY MASS INDEX: 22.23 KG/M2

## 2019-09-23 DIAGNOSIS — N90.7 SEBACEOUS CYST OF LABIA: Primary | ICD-10-CM

## 2019-09-23 PROCEDURE — 99999 PR PBB SHADOW E&M-EST. PATIENT-LVL III: ICD-10-PCS | Mod: PBBFAC,,, | Performed by: OBSTETRICS & GYNECOLOGY

## 2019-09-23 PROCEDURE — 99214 PR OFFICE/OUTPT VISIT, EST, LEVL IV, 30-39 MIN: ICD-10-PCS | Mod: S$GLB,,, | Performed by: OBSTETRICS & GYNECOLOGY

## 2019-09-23 PROCEDURE — 99214 OFFICE O/P EST MOD 30 MIN: CPT | Mod: S$GLB,,, | Performed by: OBSTETRICS & GYNECOLOGY

## 2019-09-23 PROCEDURE — 99999 PR PBB SHADOW E&M-EST. PATIENT-LVL III: CPT | Mod: PBBFAC,,, | Performed by: OBSTETRICS & GYNECOLOGY

## 2019-09-23 PROCEDURE — 3008F BODY MASS INDEX DOCD: CPT | Mod: CPTII,S$GLB,, | Performed by: OBSTETRICS & GYNECOLOGY

## 2019-09-23 PROCEDURE — 3008F PR BODY MASS INDEX (BMI) DOCUMENTED: ICD-10-PCS | Mod: CPTII,S$GLB,, | Performed by: OBSTETRICS & GYNECOLOGY

## 2019-09-23 NOTE — PATIENT INSTRUCTIONS
Epidermoid Cyst (Sebaceous Cyst), No Infection  An epidermoid cyst (sebaceous cyst) is a term that refers to 2 similar types of cysts: those found in the skin (epidermoid), and those found around hair follicles (pilar).  Some general facts about these cysts:  · A cyst is a sac filled with material that is often cheesy, fatty, oily, or fibrous. The material in them can be thick (like cottage cheese) or liquid.  · They form slowly under the skin, and can be found on most parts of the body. They are most often found in hairier areas like the scalp, face, upper back, and genitals.  · You can usually move them slightly if you try.  · They can be smaller than a pea or as large as a few inches.  · They are usually not painful, unless they become inflamed or infected.  · The area around the cyst may smell bad. If the cyst breaks open, the material inside it often smells bad too.  Causes  Epidermoid cysts are caused when skin (epidermal) cells move under the skin surface, or are covered over by it. These cells continue to multiply, like skin does normally. They then form a wall around themselves (cyst) and secrete normal skin fluids (keratin). This may be developmental. But it often happens because of an injury to the skin.  Epidermoid cysts are often found around hair follicles. These follicles are like cysts, but they have openings. Normal lubricating oils for your hair are sent out through these openings. A cyst occurs when an opening becomes blocked or the site inflamed. This often occurs when there is damage to the hair follicles by a scrape or wound.    Pilar cysts are similar to epidermoid cysts. But they start from a different part of the hair follicle, and are more likely to be on the scalp.  Symptoms  · Feeling a lump just beneath the skin  · It may or may not be painful  · The cyst may or may not smell bad  · The cyst may become inflamed or red  · The cyst may leak fluid or thick material  Home care  Epidermoid  cysts often go away without any treatment. If your cyst doesnt go away, and it bothers you, it may be drained or removed. If the cyst drains on its own, it may return. Resist the temptation to squeeze, pop, stick a needle in it, or cut it open. This often leads to an infection and scarring. If it gets severely inflamed or infected, you should seek medical care. Be sure to clean the cyst area when bathing or showering. Watch for the signs of infection listed below.  Follow-up care  Follow up with your healthcare provider, or as advised.  When to seek medical advice  Call your healthcare provider right away if any of these occur:  · Swelling, redness, or pain  · Pus coming from the cyst  Date Last Reviewed: 8/1/2016  © 0861-9259 [employer]. [address] [place] [address]. All rights reserved. This information is not intended as a substitute for professional medical care. Always follow your healthcare professional's instructions.        Shingles (Herpes Zoster)     Talk to your healthcare provider about the shingles vaccine.     Shingles is also called herpes zoster. It is a painful skin rash caused by the herpes zoster virus. This is the same virus that causes chickenpox. After a person has chickenpox, the virus remains inactive in the nerve cells. Years later, the virus can become active again and travel to the skin. Most people have shingles only once, but it is possible to have it more than once.  What are the risk factors for shingles?  Anyone who has ever had chickenpox can develop shingles. But your risk is greater if you:  · Are 50 years of age or older  · Have an illness that weakens your immune system, such as HIV/AIDS  · Have cancer, especially Hodgkin disease or lymphoma  · Take medicines that weaken your immune system  What are the symptoms of shingles?  · The first sign of shingles is usually pain, burning, tingling, or itching on one part of your face or body. You may also feel  as if you have the flu, with fever and chills.  · A red rash with small blisters appears within a few days. The rash may appear as follows:   ¨ The blisters can occur anywhere, but theyre most common on the back, chest, or abdomen.  ¨ They usually appear on only one side of the body, spreading along the nerve pathway where the virus was inactive.   ¨ The rash can also form around an eye, along one side of the face or neck, or in the mouth.  ¨ In a few people, usually those with weakened immune systems, shingles appear on more than one part of the body at once.  · After a few days, the blisters become dry and form a crust. The crust falls off in days to weeks. The blisters generally do not leave scars.  How is shingles treated?  For most people, shingles heals on its own in a few weeks. But treatment is recommended to help relieve pain, speed healing, and reduce the risk of complications. Antiviral medicines are prescribed within the first 72 hours of the appearance of the rash. To lessen symptoms:  · Apply ice packs (wrapped in a thin towel) or cool compresses, or soak in a cool bath.  · Use calamine lotion to calm itchy skin.  · Ask your healthcare provider about over-the-counter pain relievers. If your pain is severe, your healthcare provider may prescribe stronger pain medicines.  What are the complications of shingles?  Shingles often goes away with no lasting effects. But some people have serious problems long after the blisters have healed:  · Postherpetic neuralgia. This is the most common complication. It is severe nerve pain at the place where the rash used to be. It can last for months, or even years after you have had shingles. Medicines can be prescribed to help relieve the pain and improve quality of life.  · Bacterial infection. Shingles blisters may become infected with bacteria. Antibiotic medicine is used to treat the infection.  · Eye problems. A person with shingles on the face should see his or  her healthcare provider right away. Shingles can cause serious problems with vision, and even blindness.  Very rarely shingles can also lead to pneumonia, hearing problems, brain inflammation, or even death.   When to seek medical care  Contact your healthcare provider if you experience any of the following:  · Symptoms that dont go away with treatment  · A rash or blisters near your eye  · Increased drainage, fever, or rash after treatment, or severe pain that doesnt go away   How can shingles be prevented?  You can only get shingles if you have had chicken pox in the past. Those who have never had chickenpox can get the virus from you. Although instead of developing shingles, the person may get chickenpox. Until your blisters form scabs, avoid contact with others, especially the following:  · Pregnant women who have never had chickenpox or the vaccine  · Infants who were born early (prematurely) or who had low weight at birth  · People with weak immune system (for example, people receiving chemotherapy for cancer, people who have had organ transplants, or people with HIV infections)     The shingles vaccine  If youre 60 years of age or older, ask your healthcare provider if you should receive the shingles vaccine. The vaccine makes it less likely that you will develop shingles. If you do develop shingles, your symptoms will likely be milder than if you hadnt been vaccinated. Note: Although the vaccine is licensed for people 50 years of age or older, the CDC does not recommend the vaccine for those who are 50 to 59 years old.   Date Last Reviewed: 10/1/2016  © 5877-8810 [employer] [address] [place] [address], [place] [address]. All rights reserved. This information is not intended as a substitute for professional medical care. Always follow your healthcare professional's instructions.         ondansetron 4 mg oral tablet, disintegratin tab(s) orally every 4 hours; busPIRone 5 mg oral tablet: 0.5 tab(s) orally 2 times a day; simethicone 80 mg oral tablet, chewable: 2 tab(s) orally once a day; montelukast 10 mg oral tablet: 1 tab(s) orally once a day (at bedtime); famotidine 20 mg oral tablet: 1 tab(s) orally 2 times a day; docusate sodium 100 mg oral capsule: 1 cap(s) orally 3 times a day  LORazepam 2 mg oral tablet: orally every 4 hours; Cardizem  mg/24 hours oral capsule, extended release: 1 cap(s) orally once a day Remeron 3.75mg HS, Ativan 2mg Q4H (pt reports taking about 10mg daily and trying to taper down as possible - see ISTOP). Medical medications - Cardizem, Pepcid, Simithicone

## 2019-09-23 NOTE — PROGRESS NOTES
Subjective:       Patient ID: Kimberly Jo is a 59 y.o. female.    Chief Complaint:  Mass (on vagina)      History of Present Illness  HPI  Pt is 59 y.o. here for evaluation of cysts on her left labia.  She first noticed them a few weeks ago.  They looked like white heads.  Her and her  trying to pop them.  She even used tweezers to try to pop them.  She was never successful.  No fevers.  No recent trauma.  It does irritate her undergarments.    GYN & OB History  No LMP recorded. Patient is postmenopausal.   Date of Last Pap: 2018    OB History    Para Term  AB Living   5 4 2 0 1 2   SAB TAB Ectopic Multiple Live Births   1 0 0 0        # Outcome Date GA Lbr Darrell/2nd Weight Sex Delivery Anes PTL Lv   5 Term            4 Term            3 SAB            2 Para      Vag-Spont      1 Para      Vag-Spont          Review of Systems  Review of Systems   Constitutional: Negative for activity change, appetite change and fatigue.   HENT: Negative.  Negative for tinnitus.    Eyes: Negative.    Respiratory: Negative for cough and shortness of breath.    Cardiovascular: Negative for chest pain and palpitations.   Gastrointestinal: Negative.  Negative for abdominal pain, blood in stool, constipation, diarrhea and nausea.   Endocrine: Negative.  Negative for hot flashes.   Genitourinary: Positive for genital sores. Negative for dysmenorrhea, dyspareunia, dysuria, frequency, menstrual problem, pelvic pain, vaginal discharge, urinary incontinence and postcoital bleeding.   Musculoskeletal: Negative for back pain and joint swelling.   Integumentary:  Negative for rash, breast mass, nipple discharge and breast skin changes.   Neurological: Negative.  Negative for headaches.   Hematological: Negative.  Does not bruise/bleed easily.   Psychiatric/Behavioral: The patient is not nervous/anxious.    Breast: negative.  Negative for mass, nipple discharge and skin changes          Objective:    Physical Exam:    Constitutional: She is oriented to person, place, and time. She appears well-developed and well-nourished. No distress.    HENT:   Head: Normocephalic and atraumatic.    Eyes: Pupils are equal, round, and reactive to light. Conjunctivae and lids are normal.    Neck: Normal range of motion and full passive range of motion without pain. Neck supple.    Cardiovascular: Normal rate and regular rhythm.  Exam reveals no edema.     Pulmonary/Chest: Effort normal and breath sounds normal. She has no wheezes.        Abdominal: Soft. Normal appearance and bowel sounds are normal. She exhibits no distension. There is no tenderness. There is no rebound and no guarding.     Genitourinary:       Pelvic exam was performed with patient supine. There is lesion on the right labia. There is no rash, tenderness or injury on the right labia. There is lesion on the left labia. There is no rash, tenderness or injury on the left labia. Labial bartholins normal.  Genitourinary Comments: Lesions that are most consistent with sebaceous cysts.  Majority are now black (after trying to pop them?) with a central umbilication           Musculoskeletal: Normal range of motion and moves all extremeties.       Neurological: She is alert and oriented to person, place, and time. She has normal strength.    Skin: Skin is warm and dry.    Psychiatric: She has a normal mood and affect. Her speech is normal and behavior is normal. Thought content normal. Her mood appears not anxious. She does not exhibit a depressed mood. She expresses no suicidal plans and no homicidal plans.          Assessment:        1. Sebaceous cyst of labia                Plan:      Kimberly was seen today for mass.    Diagnoses and all orders for this visit:    Sebaceous cyst of labia  We discussed good jax care. We also discussed the utility of surgical excision.  Patient will try using a cream to help pull the inner seed.  But she can continue to use hot and warm compresses.   Patient was encouraged to avoid trying to pop them as she may create an infection which would cause a bigger problem.  These are not cancerous.  And this is not consistent with her prior history of zoster.  All questions were answered.

## 2019-11-04 ENCOUNTER — OFFICE VISIT (OUTPATIENT)
Dept: PSYCHIATRY | Facility: CLINIC | Age: 59
End: 2019-11-04
Payer: COMMERCIAL

## 2019-11-04 DIAGNOSIS — F41.1 GAD (GENERALIZED ANXIETY DISORDER): Primary | ICD-10-CM

## 2019-11-04 DIAGNOSIS — F33.1 MODERATE EPISODE OF RECURRENT MAJOR DEPRESSIVE DISORDER: ICD-10-CM

## 2019-11-04 DIAGNOSIS — Z65.8 INTERPERSONAL PROBLEM: ICD-10-CM

## 2019-11-04 DIAGNOSIS — F31.61 BIPOLAR DISORDER, CURRENT EPISODE MIXED, MILD: ICD-10-CM

## 2019-11-04 PROCEDURE — 90834 PR PSYCHOTHERAPY W/PATIENT, 45 MIN: ICD-10-PCS | Mod: S$GLB,,, | Performed by: SOCIAL WORKER

## 2019-11-04 PROCEDURE — 90834 PSYTX W PT 45 MINUTES: CPT | Mod: S$GLB,,, | Performed by: SOCIAL WORKER

## 2019-11-18 ENCOUNTER — OFFICE VISIT (OUTPATIENT)
Dept: PSYCHIATRY | Facility: CLINIC | Age: 59
End: 2019-11-18
Payer: COMMERCIAL

## 2019-11-18 DIAGNOSIS — Z65.8 INTERPERSONAL PROBLEM: ICD-10-CM

## 2019-11-18 DIAGNOSIS — Z63.9 FAMILY PROBLEMS: ICD-10-CM

## 2019-11-18 DIAGNOSIS — F41.1 GAD (GENERALIZED ANXIETY DISORDER): Primary | ICD-10-CM

## 2019-11-18 DIAGNOSIS — F33.1 MODERATE EPISODE OF RECURRENT MAJOR DEPRESSIVE DISORDER: ICD-10-CM

## 2019-11-18 PROCEDURE — 90834 PR PSYCHOTHERAPY W/PATIENT, 45 MIN: ICD-10-PCS | Mod: S$GLB,,, | Performed by: SOCIAL WORKER

## 2019-11-18 PROCEDURE — 90834 PSYTX W PT 45 MINUTES: CPT | Mod: S$GLB,,, | Performed by: SOCIAL WORKER

## 2019-11-18 SDOH — SOCIAL DETERMINANTS OF HEALTH (SDOH): PROBLEM RELATED TO PRIMARY SUPPORT GROUP, UNSPECIFIED: Z63.9

## 2019-11-25 RX ORDER — QUETIAPINE 300 MG/1
TABLET, FILM COATED, EXTENDED RELEASE ORAL
COMMUNITY
Start: 2019-10-01 | End: 2022-03-03 | Stop reason: DRUGHIGH

## 2020-01-13 ENCOUNTER — OFFICE VISIT (OUTPATIENT)
Dept: PSYCHIATRY | Facility: CLINIC | Age: 60
End: 2020-01-13
Payer: COMMERCIAL

## 2020-01-13 DIAGNOSIS — Z65.8 INTERPERSONAL PROBLEM: ICD-10-CM

## 2020-01-13 DIAGNOSIS — F41.1 GAD (GENERALIZED ANXIETY DISORDER): ICD-10-CM

## 2020-01-13 DIAGNOSIS — F33.1 MODERATE EPISODE OF RECURRENT MAJOR DEPRESSIVE DISORDER: ICD-10-CM

## 2020-01-13 DIAGNOSIS — Z63.9 FAMILY PROBLEMS: ICD-10-CM

## 2020-01-13 DIAGNOSIS — F31.61 BIPOLAR DISORDER, CURRENT EPISODE MIXED, MILD: Primary | ICD-10-CM

## 2020-01-13 PROCEDURE — 90834 PR PSYCHOTHERAPY W/PATIENT, 45 MIN: ICD-10-PCS | Mod: S$GLB,,, | Performed by: SOCIAL WORKER

## 2020-01-13 PROCEDURE — 90834 PSYTX W PT 45 MINUTES: CPT | Mod: S$GLB,,, | Performed by: SOCIAL WORKER

## 2020-01-13 SDOH — SOCIAL DETERMINANTS OF HEALTH (SDOH): PROBLEM RELATED TO PRIMARY SUPPORT GROUP, UNSPECIFIED: Z63.9
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
## 2020-02-10 ENCOUNTER — OFFICE VISIT (OUTPATIENT)
Dept: PRIMARY CARE CLINIC | Facility: CLINIC | Age: 60
End: 2020-02-10
Payer: COMMERCIAL

## 2020-02-10 VITALS
RESPIRATION RATE: 18 BRPM | DIASTOLIC BLOOD PRESSURE: 64 MMHG | TEMPERATURE: 99 F | HEART RATE: 80 BPM | HEIGHT: 63 IN | BODY MASS INDEX: 23.39 KG/M2 | SYSTOLIC BLOOD PRESSURE: 100 MMHG | OXYGEN SATURATION: 99 % | WEIGHT: 132 LBS

## 2020-02-10 DIAGNOSIS — K21.9 GASTROESOPHAGEAL REFLUX DISEASE, ESOPHAGITIS PRESENCE NOT SPECIFIED: Primary | ICD-10-CM

## 2020-02-10 DIAGNOSIS — F41.1 GAD (GENERALIZED ANXIETY DISORDER): ICD-10-CM

## 2020-02-10 DIAGNOSIS — F31.61 BIPOLAR DISORDER, CURRENT EPISODE MIXED, MILD: ICD-10-CM

## 2020-02-10 PROCEDURE — 99213 OFFICE O/P EST LOW 20 MIN: CPT | Mod: S$GLB,,, | Performed by: NURSE PRACTITIONER

## 2020-02-10 PROCEDURE — 3008F BODY MASS INDEX DOCD: CPT | Mod: CPTII,S$GLB,, | Performed by: NURSE PRACTITIONER

## 2020-02-10 PROCEDURE — 99999 PR PBB SHADOW E&M-EST. PATIENT-LVL IV: ICD-10-PCS | Mod: PBBFAC,,, | Performed by: NURSE PRACTITIONER

## 2020-02-10 PROCEDURE — 99999 PR PBB SHADOW E&M-EST. PATIENT-LVL IV: CPT | Mod: PBBFAC,,, | Performed by: NURSE PRACTITIONER

## 2020-02-10 PROCEDURE — 99213 PR OFFICE/OUTPT VISIT, EST, LEVL III, 20-29 MIN: ICD-10-PCS | Mod: S$GLB,,, | Performed by: NURSE PRACTITIONER

## 2020-02-10 PROCEDURE — 3008F PR BODY MASS INDEX (BMI) DOCUMENTED: ICD-10-PCS | Mod: CPTII,S$GLB,, | Performed by: NURSE PRACTITIONER

## 2020-02-10 RX ORDER — CITALOPRAM 20 MG/1
20 TABLET, FILM COATED ORAL DAILY
COMMUNITY
End: 2023-03-29

## 2020-02-11 NOTE — PROGRESS NOTES
Subjective:       Patient ID: Kimberly Jo is a 60 y.o. female.    Chief Complaint: Gastroesophageal Reflux (occurring for a few months, taking OTC tums, waking her up at night)    Ms. Jo presents with her  for acid reflux symptoms, including waking up at night feeling nauseated with acid feeling in chest. She has been eating more chips and junk food due to stress. She drinks a lot of coffee. OTC tums resolves symptoms.     Review of Systems   Constitutional: Negative for fever.   HENT: Negative for facial swelling.    Eyes: Negative for visual disturbance.   Respiratory: Negative for shortness of breath.    Cardiovascular: Negative for chest pain.   Gastrointestinal: Positive for abdominal pain and nausea. Negative for constipation, diarrhea and vomiting.   Genitourinary: Negative for dysuria.   Musculoskeletal: Negative for gait problem.   Skin: Negative for rash.   Neurological: Negative for headaches.   Psychiatric/Behavioral: Negative for confusion.       Objective:      Physical Exam   Constitutional: She is oriented to person, place, and time. She appears well-developed and well-nourished. No distress.   HENT:   Head: Normocephalic and atraumatic.   Eyes: No scleral icterus.   Neck: Normal range of motion. Neck supple.   Cardiovascular: Normal rate, regular rhythm and normal heart sounds.   Pulmonary/Chest: Effort normal and breath sounds normal. No stridor. No respiratory distress. She has no wheezes. She has no rales.   Abdominal: Soft. Bowel sounds are normal. She exhibits no distension and no mass. There is no tenderness. There is no rebound and no guarding.   Musculoskeletal: She exhibits no edema.   Neurological: She is alert and oriented to person, place, and time.   Skin: Skin is warm and dry. She is not diaphoretic.   Psychiatric: Her mood appears anxious. Her speech is rapid and/or pressured.   Nursing note and vitals reviewed.      Assessment:       1. Gastroesophageal reflux disease,  esophagitis presence not specified    2. HUMPHREY (generalized anxiety disorder)    3. Bipolar disorder, current episode mixed, mild        Plan:   Kimberly was seen today for gastroesophageal reflux.    Diagnoses and all orders for this visit:    Gastroesophageal reflux disease, esophagitis presence not specified  - Nonpharmacologic interventions discussed. Continue TUMS prn. If no improvement with lifestyle and diet changes, will consider h pylori stool testing anf 2 week trial of PPI.    HUMPHREY (generalized anxiety disorder)    Bipolar disorder, current episode mixed, mild            Pt has been given instructions populated from [employer] database and has verbalized understanding of the after visit summary and information contained wherein.    Follow up with a primary care provider. May go to ER for acute shortness of breath, lightheadedness, fever, or any other emergent complaints or changes in condition.

## 2020-02-12 ENCOUNTER — OFFICE VISIT (OUTPATIENT)
Dept: PSYCHIATRY | Facility: CLINIC | Age: 60
End: 2020-02-12
Payer: COMMERCIAL

## 2020-02-12 DIAGNOSIS — Z63.9 FAMILY PROBLEMS: ICD-10-CM

## 2020-02-12 DIAGNOSIS — Z65.8 INTERPERSONAL PROBLEM: ICD-10-CM

## 2020-02-12 DIAGNOSIS — F33.1 MODERATE EPISODE OF RECURRENT MAJOR DEPRESSIVE DISORDER: ICD-10-CM

## 2020-02-12 DIAGNOSIS — F31.61 BIPOLAR DISORDER, CURRENT EPISODE MIXED, MILD: Primary | ICD-10-CM

## 2020-02-12 DIAGNOSIS — F41.1 GAD (GENERALIZED ANXIETY DISORDER): ICD-10-CM

## 2020-02-12 PROCEDURE — 90834 PR PSYCHOTHERAPY W/PATIENT, 45 MIN: ICD-10-PCS | Mod: S$GLB,,, | Performed by: SOCIAL WORKER

## 2020-02-12 PROCEDURE — 90834 PSYTX W PT 45 MINUTES: CPT | Mod: S$GLB,,, | Performed by: SOCIAL WORKER

## 2020-02-12 SDOH — SOCIAL DETERMINANTS OF HEALTH (SDOH): PROBLEM RELATED TO PRIMARY SUPPORT GROUP, UNSPECIFIED: Z63.9
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
## 2020-05-27 ENCOUNTER — TELEPHONE (OUTPATIENT)
Dept: PRIMARY CARE CLINIC | Facility: CLINIC | Age: 60
End: 2020-05-27

## 2020-05-27 DIAGNOSIS — Z00.00 ANNUAL PHYSICAL EXAM: Primary | ICD-10-CM

## 2020-05-27 DIAGNOSIS — Z11.4 SCREENING FOR HIV WITHOUT PRESENCE OF RISK FACTORS: ICD-10-CM

## 2020-05-27 NOTE — TELEPHONE ENCOUNTER
----- Message from Sarai Hampton sent at 5/27/2020  4:32 PM CDT -----  Contact: Pt  Doctor appointment and lab have been scheduled.  Please link lab orders to the lab appointment.  Date of doctor appointment:  9/8/20  Date of lab appointment:  9/3/20  Physical or EP: EPP

## 2020-06-23 ENCOUNTER — OFFICE VISIT (OUTPATIENT)
Dept: PRIMARY CARE CLINIC | Facility: CLINIC | Age: 60
End: 2020-06-23
Payer: COMMERCIAL

## 2020-06-23 DIAGNOSIS — R09.82 POSTNASAL DRIP: ICD-10-CM

## 2020-06-23 DIAGNOSIS — K21.9 GASTROESOPHAGEAL REFLUX DISEASE, ESOPHAGITIS PRESENCE NOT SPECIFIED: Primary | ICD-10-CM

## 2020-06-23 PROCEDURE — 99213 PR OFFICE/OUTPT VISIT, EST, LEVL III, 20-29 MIN: ICD-10-PCS | Mod: 95,,, | Performed by: INTERNAL MEDICINE

## 2020-06-23 PROCEDURE — 99213 OFFICE O/P EST LOW 20 MIN: CPT | Mod: 95,,, | Performed by: INTERNAL MEDICINE

## 2020-06-25 NOTE — PROGRESS NOTES
Subjective:       Patient ID: Kimberly Jo is a 60 y.o. female.    Chief Complaint: Sore Throat    Patient of Dr. Dalal presents for an urgent virtual audio visit c/o sore throat. Describes a constant mild to moderate pain on only left side of throat x 2 weeks, unchanged since onset. No associated fever, chills, sweats, body aches. Throat pain is not severe, able to speak comfortably, minimal discomfort on swallowing, no dysphagia. Denies nasal symptoms, then admits to constantly having to clear mucus from her throat - no meds tried. Known h/o GERD, not currently on any acid reducers and hasn't tried any for this. Also hasn't used anything for pain, either oral analgesics, topical or warm saltwater gargling.     Past history, meds and allergies reviewed.      Review of Systems   Constitutional: Negative for appetite change, chills, fever and unexpected weight change.   HENT: Positive for postnasal drip and sore throat. Negative for nasal congestion, dental problem, rhinorrhea, sinus pressure/congestion, sneezing, trouble swallowing and voice change.    Respiratory: Negative for cough, choking, chest tightness, shortness of breath, wheezing and stridor.    Cardiovascular: Negative for chest pain and palpitations.   Gastrointestinal: Positive for reflux. Negative for abdominal pain, blood in stool, change in bowel habit, nausea, vomiting and change in bowel habit.   Musculoskeletal: Negative for arthralgias and myalgias.         Objective:      Physical Exam  Constitutional:       Comments: Speaking in full sentences, does not sound ill or weak.    Neurological:      Mental Status: She is alert.   Psychiatric:         Mood and Affect: Mood normal.         Behavior: Behavior normal.        -  Audio only.     Assessment:       1. Gastroesophageal reflux disease, esophagitis presence not specified    2. Postnasal drip        Plan:         Established Patient - Audio Only Telehealth Visit     The patient location is:  [place]  The chief complaint leading to consultation is: Sore Throat  Visit type: Virtual visit with audio only (telephone)  Total time spent with patient: 16 minutes     The reason for the audio only service rather than synchronous audio and video virtual visit was related to technical difficulties or patient preference/necessity.     Each patient to whom I provide medical services by telemedicine is:  (1) informed of the relationship between the physician and patient and the respective role of any other health care provider with respect to management of the patient; and (2) notified that they may decline to receive medical services by telemedicine and may withdraw from such care at any time. Patient verbally consented to receive this service via voice-only telephone call.     HPI: see above.      Assessment and plan:    Gastroesophageal reflux disease, esophagitis presence not specified     -  Suspect Laryngopharyngeal Reflux Disease - advised OTC Pepcid 20mg BID, with likely results after a few doses; and reflux precautions.     Postnasal drip     -  Has a component of upper respiratory symptoms, take OTC Claritin 10mg one tab daily for postnasal drip, to clear throat.     This service was not originating from a related E/M service provided within the previous 7 days nor will  to an E/M service or procedure within the next 24 hours or my soonest available appointment.  Prevailing standard of care was able to be met in this audio-only visit.

## 2020-07-06 RX ORDER — ROSUVASTATIN CALCIUM 10 MG/1
TABLET, COATED ORAL
Qty: 90 TABLET | Refills: 0 | Status: SHIPPED | OUTPATIENT
Start: 2020-07-06 | End: 2020-10-09
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
## 2020-08-25 ENCOUNTER — PATIENT OUTREACH (OUTPATIENT)
Dept: ADMINISTRATIVE | Facility: HOSPITAL | Age: 60
End: 2020-08-25

## 2020-08-25 DIAGNOSIS — Z12.31 ENCOUNTER FOR SCREENING MAMMOGRAM FOR BREAST CANCER: Primary | ICD-10-CM

## 2020-08-25 NOTE — PROGRESS NOTES
Pre-visit chart review completed.  Immunizations reviewed and updated.    Patient due for the following    HIV Screening     Mammogram       Patient is scheduled to have HIV testing done.    Spoke with patient and she states she will call back to schedule the mammogram.

## 2020-08-28 ENCOUNTER — HOSPITAL ENCOUNTER (OUTPATIENT)
Facility: HOSPITAL | Age: 60
Discharge: HOME | End: 2020-08-29
Attending: EMERGENCY MEDICINE | Admitting: SURGERY
Payer: COMMERCIAL

## 2020-08-28 ENCOUNTER — ANESTHESIA (OUTPATIENT)
Dept: SURGERY | Facility: HOSPITAL | Age: 60
End: 2020-08-28
Payer: COMMERCIAL

## 2020-08-28 ENCOUNTER — ANESTHESIA EVENT (OUTPATIENT)
Dept: SURGERY | Facility: HOSPITAL | Age: 60
End: 2020-08-28
Payer: COMMERCIAL

## 2020-08-28 DIAGNOSIS — K35.30 ACUTE APPENDICITIS WITH LOCALIZED PERITONITIS, WITHOUT PERFORATION, ABSCESS, OR GANGRENE: Primary | ICD-10-CM

## 2020-08-28 LAB
ALBUMIN SERPL BCP-MCNC: 4.1 G/DL (ref 3.5–5.2)
ALP SERPL-CCNC: 98 U/L (ref 55–135)
ALT SERPL W/O P-5'-P-CCNC: 24 U/L (ref 10–44)
ANION GAP SERPL CALC-SCNC: 6 MMOL/L (ref 8–16)
ANION GAP SERPL CALC-SCNC: 9 MMOL/L (ref 8–16)
AST SERPL-CCNC: 19 U/L (ref 10–40)
BACTERIA #/AREA URNS AUTO: ABNORMAL /HPF
BASOPHILS # BLD AUTO: 0.05 K/UL (ref 0–0.2)
BASOPHILS # BLD AUTO: 0.06 K/UL (ref 0–0.2)
BASOPHILS NFR BLD: 0.3 % (ref 0–1.9)
BASOPHILS NFR BLD: 0.3 % (ref 0–1.9)
BILIRUB SERPL-MCNC: 0.5 MG/DL (ref 0.1–1)
BILIRUB UR QL STRIP: NEGATIVE
BUN SERPL-MCNC: 16 MG/DL (ref 8–23)
BUN SERPL-MCNC: 18 MG/DL (ref 8–23)
CALCIUM SERPL-MCNC: 8.2 MG/DL (ref 8.7–10.5)
CALCIUM SERPL-MCNC: 9.6 MG/DL (ref 8.7–10.5)
CHLORIDE SERPL-SCNC: 102 MMOL/L (ref 95–110)
CHLORIDE SERPL-SCNC: 106 MMOL/L (ref 95–110)
CLARITY UR REFRACT.AUTO: ABNORMAL
CO2 SERPL-SCNC: 25 MMOL/L (ref 23–29)
CO2 SERPL-SCNC: 27 MMOL/L (ref 23–29)
COLOR UR AUTO: YELLOW
CREAT SERPL-MCNC: 0.9 MG/DL (ref 0.5–1.4)
CREAT SERPL-MCNC: 1 MG/DL (ref 0.5–1.4)
DIFFERENTIAL METHOD: ABNORMAL
DIFFERENTIAL METHOD: ABNORMAL
EOSINOPHIL # BLD AUTO: 0.1 K/UL (ref 0–0.5)
EOSINOPHIL # BLD AUTO: 0.1 K/UL (ref 0–0.5)
EOSINOPHIL NFR BLD: 0.3 % (ref 0–8)
EOSINOPHIL NFR BLD: 0.4 % (ref 0–8)
ERYTHROCYTE [DISTWIDTH] IN BLOOD BY AUTOMATED COUNT: 12.8 % (ref 11.5–14.5)
ERYTHROCYTE [DISTWIDTH] IN BLOOD BY AUTOMATED COUNT: 12.8 % (ref 11.5–14.5)
EST. GFR  (AFRICAN AMERICAN): >60 ML/MIN/1.73 M^2
EST. GFR  (AFRICAN AMERICAN): >60 ML/MIN/1.73 M^2
EST. GFR  (NON AFRICAN AMERICAN): >60 ML/MIN/1.73 M^2
EST. GFR  (NON AFRICAN AMERICAN): >60 ML/MIN/1.73 M^2
GLUCOSE SERPL-MCNC: 102 MG/DL (ref 70–110)
GLUCOSE SERPL-MCNC: 129 MG/DL (ref 70–110)
GLUCOSE UR QL STRIP: NEGATIVE
HCT VFR BLD AUTO: 36.6 % (ref 37–48.5)
HCT VFR BLD AUTO: 39.9 % (ref 37–48.5)
HGB BLD-MCNC: 11.7 G/DL (ref 12–16)
HGB BLD-MCNC: 13.1 G/DL (ref 12–16)
HGB UR QL STRIP: ABNORMAL
IMM GRANULOCYTES # BLD AUTO: 0.08 K/UL (ref 0–0.04)
IMM GRANULOCYTES # BLD AUTO: 0.15 K/UL (ref 0–0.04)
IMM GRANULOCYTES NFR BLD AUTO: 0.5 % (ref 0–0.5)
IMM GRANULOCYTES NFR BLD AUTO: 0.7 % (ref 0–0.5)
KETONES UR QL STRIP: NEGATIVE
LEUKOCYTE ESTERASE UR QL STRIP: NEGATIVE
LYMPHOCYTES # BLD AUTO: 2.7 K/UL (ref 1–4.8)
LYMPHOCYTES # BLD AUTO: 4.6 K/UL (ref 1–4.8)
LYMPHOCYTES NFR BLD: 16.7 % (ref 18–48)
LYMPHOCYTES NFR BLD: 23 % (ref 18–48)
MAGNESIUM SERPL-MCNC: 1.7 MG/DL (ref 1.6–2.6)
MCH RBC QN AUTO: 33 PG (ref 27–31)
MCH RBC QN AUTO: 33.1 PG (ref 27–31)
MCHC RBC AUTO-ENTMCNC: 32 G/DL (ref 32–36)
MCHC RBC AUTO-ENTMCNC: 32.8 G/DL (ref 32–36)
MCV RBC AUTO: 101 FL (ref 82–98)
MCV RBC AUTO: 103 FL (ref 82–98)
MICROSCOPIC COMMENT: ABNORMAL
MONOCYTES # BLD AUTO: 1 K/UL (ref 0.3–1)
MONOCYTES # BLD AUTO: 1.1 K/UL (ref 0.3–1)
MONOCYTES NFR BLD: 5.6 % (ref 4–15)
MONOCYTES NFR BLD: 6.2 % (ref 4–15)
NEUTROPHILS # BLD AUTO: 12.1 K/UL (ref 1.8–7.7)
NEUTROPHILS # BLD AUTO: 14.1 K/UL (ref 1.8–7.7)
NEUTROPHILS NFR BLD: 70 % (ref 38–73)
NEUTROPHILS NFR BLD: 76 % (ref 38–73)
NITRITE UR QL STRIP: NEGATIVE
NRBC BLD-RTO: 0 /100 WBC
NRBC BLD-RTO: 0 /100 WBC
PH UR STRIP: 6 [PH] (ref 5–8)
PHOSPHATE SERPL-MCNC: 3.3 MG/DL (ref 2.7–4.5)
PLATELET # BLD AUTO: 255 K/UL (ref 150–350)
PLATELET # BLD AUTO: 307 K/UL (ref 150–350)
PMV BLD AUTO: 9.4 FL (ref 9.2–12.9)
PMV BLD AUTO: 9.4 FL (ref 9.2–12.9)
POTASSIUM SERPL-SCNC: 4 MMOL/L (ref 3.5–5.1)
POTASSIUM SERPL-SCNC: 4 MMOL/L (ref 3.5–5.1)
PROT SERPL-MCNC: 7.7 G/DL (ref 6–8.4)
PROT UR QL STRIP: NEGATIVE
RBC # BLD AUTO: 3.54 M/UL (ref 4–5.4)
RBC # BLD AUTO: 3.97 M/UL (ref 4–5.4)
RBC #/AREA URNS AUTO: 8 /HPF (ref 0–4)
SARS-COV-2 RDRP RESP QL NAA+PROBE: NEGATIVE
SODIUM SERPL-SCNC: 137 MMOL/L (ref 136–145)
SODIUM SERPL-SCNC: 138 MMOL/L (ref 136–145)
SP GR UR STRIP: 1.01 (ref 1–1.03)
URN SPEC COLLECT METH UR: ABNORMAL
WBC # BLD AUTO: 15.95 K/UL (ref 3.9–12.7)
WBC # BLD AUTO: 20.11 K/UL (ref 3.9–12.7)
WBC #/AREA URNS AUTO: 4 /HPF (ref 0–5)
YEAST UR QL AUTO: ABNORMAL

## 2020-08-28 PROCEDURE — 37000009 HC ANESTHESIA EA ADD 15 MINS: Performed by: SURGERY

## 2020-08-28 PROCEDURE — 83735 ASSAY OF MAGNESIUM: CPT

## 2020-08-28 PROCEDURE — 25000003 PHARM REV CODE 250: Performed by: SURGERY

## 2020-08-28 PROCEDURE — D9220A PRA ANESTHESIA: Mod: CRNA,,, | Performed by: NURSE ANESTHETIST, CERTIFIED REGISTERED

## 2020-08-28 PROCEDURE — D9220A PRA ANESTHESIA: Mod: ANES,,, | Performed by: ANESTHESIOLOGY

## 2020-08-28 PROCEDURE — 88304 TISSUE EXAM BY PATHOLOGIST: CPT | Mod: 26,,, | Performed by: PATHOLOGY

## 2020-08-28 PROCEDURE — 63600175 PHARM REV CODE 636 W HCPCS: Performed by: PHYSICIAN ASSISTANT

## 2020-08-28 PROCEDURE — 71000015 HC POSTOP RECOV 1ST HR: Performed by: SURGERY

## 2020-08-28 PROCEDURE — 94761 N-INVAS EAR/PLS OXIMETRY MLT: CPT

## 2020-08-28 PROCEDURE — 63600175 PHARM REV CODE 636 W HCPCS

## 2020-08-28 PROCEDURE — 80053 COMPREHEN METABOLIC PANEL: CPT

## 2020-08-28 PROCEDURE — D9220A PRA ANESTHESIA: ICD-10-PCS | Mod: ANES,,, | Performed by: ANESTHESIOLOGY

## 2020-08-28 PROCEDURE — 99285 EMERGENCY DEPT VISIT HI MDM: CPT | Mod: ,,, | Performed by: EMERGENCY MEDICINE

## 2020-08-28 PROCEDURE — 36000708 HC OR TIME LEV III 1ST 15 MIN: Performed by: SURGERY

## 2020-08-28 PROCEDURE — G0378 HOSPITAL OBSERVATION PER HR: HCPCS

## 2020-08-28 PROCEDURE — 44950 PR APPENDECTOMY: ICD-10-PCS | Mod: ,,, | Performed by: SURGERY

## 2020-08-28 PROCEDURE — 71000033 HC RECOVERY, INTIAL HOUR: Performed by: SURGERY

## 2020-08-28 PROCEDURE — 36000709 HC OR TIME LEV III EA ADD 15 MIN: Performed by: SURGERY

## 2020-08-28 PROCEDURE — 81001 URINALYSIS AUTO W/SCOPE: CPT

## 2020-08-28 PROCEDURE — D9220A PRA ANESTHESIA: ICD-10-PCS | Mod: CRNA,,, | Performed by: NURSE ANESTHETIST, CERTIFIED REGISTERED

## 2020-08-28 PROCEDURE — 25000003 PHARM REV CODE 250: Performed by: NURSE ANESTHETIST, CERTIFIED REGISTERED

## 2020-08-28 PROCEDURE — 99285 EMERGENCY DEPT VISIT HI MDM: CPT | Mod: 25

## 2020-08-28 PROCEDURE — 25000003 PHARM REV CODE 250: Performed by: STUDENT IN AN ORGANIZED HEALTH CARE EDUCATION/TRAINING PROGRAM

## 2020-08-28 PROCEDURE — 44950 APPENDECTOMY: CPT | Mod: ,,, | Performed by: SURGERY

## 2020-08-28 PROCEDURE — 99220 PR INITIAL OBSERVATION CARE,LEVL III: CPT | Mod: 57,,, | Performed by: SURGERY

## 2020-08-28 PROCEDURE — 96374 THER/PROPH/DIAG INJ IV PUSH: CPT | Mod: 59

## 2020-08-28 PROCEDURE — 63600175 PHARM REV CODE 636 W HCPCS: Performed by: STUDENT IN AN ORGANIZED HEALTH CARE EDUCATION/TRAINING PROGRAM

## 2020-08-28 PROCEDURE — 84100 ASSAY OF PHOSPHORUS: CPT

## 2020-08-28 PROCEDURE — 25500020 PHARM REV CODE 255: Performed by: EMERGENCY MEDICINE

## 2020-08-28 PROCEDURE — 37000008 HC ANESTHESIA 1ST 15 MINUTES: Performed by: SURGERY

## 2020-08-28 PROCEDURE — U0002 COVID-19 LAB TEST NON-CDC: HCPCS

## 2020-08-28 PROCEDURE — S0020 INJECTION, BUPIVICAINE HYDRO: HCPCS | Performed by: SURGERY

## 2020-08-28 PROCEDURE — 88304 TISSUE EXAM BY PATHOLOGIST: CPT | Performed by: PATHOLOGY

## 2020-08-28 PROCEDURE — 99220 PR INITIAL OBSERVATION CARE,LEVL III: ICD-10-PCS | Mod: 57,,, | Performed by: SURGERY

## 2020-08-28 PROCEDURE — 63600175 PHARM REV CODE 636 W HCPCS: Performed by: ANESTHESIOLOGY

## 2020-08-28 PROCEDURE — 96375 TX/PRO/DX INJ NEW DRUG ADDON: CPT

## 2020-08-28 PROCEDURE — 36415 COLL VENOUS BLD VENIPUNCTURE: CPT

## 2020-08-28 PROCEDURE — 99285 PR EMERGENCY DEPT VISIT,LEVEL V: ICD-10-PCS | Mod: ,,, | Performed by: EMERGENCY MEDICINE

## 2020-08-28 PROCEDURE — 63600175 PHARM REV CODE 636 W HCPCS: Performed by: NURSE ANESTHETIST, CERTIFIED REGISTERED

## 2020-08-28 PROCEDURE — 27201423 OPTIME MED/SURG SUP & DEVICES STERILE SUPPLY: Performed by: SURGERY

## 2020-08-28 PROCEDURE — 85025 COMPLETE CBC W/AUTO DIFF WBC: CPT

## 2020-08-28 PROCEDURE — 80048 BASIC METABOLIC PNL TOTAL CA: CPT

## 2020-08-28 PROCEDURE — 88304 PR  SURG PATH,LEVEL III: ICD-10-PCS | Mod: 26,,, | Performed by: PATHOLOGY

## 2020-08-28 PROCEDURE — 96375 TX/PRO/DX INJ NEW DRUG ADDON: CPT | Mod: 59

## 2020-08-28 RX ORDER — BUPIVACAINE HYDROCHLORIDE 5 MG/ML
INJECTION, SOLUTION EPIDURAL; INTRACAUDAL
Status: DISCONTINUED | OUTPATIENT
Start: 2020-08-28 | End: 2020-08-28 | Stop reason: HOSPADM

## 2020-08-28 RX ORDER — FENTANYL CITRATE 50 UG/ML
INJECTION, SOLUTION INTRAMUSCULAR; INTRAVENOUS
Status: COMPLETED
Start: 2020-08-28 | End: 2020-08-28

## 2020-08-28 RX ORDER — ONDANSETRON 2 MG/ML
4 INJECTION INTRAMUSCULAR; INTRAVENOUS EVERY 12 HOURS PRN
Status: DISCONTINUED | OUTPATIENT
Start: 2020-08-28 | End: 2020-08-28

## 2020-08-28 RX ORDER — ONDANSETRON 2 MG/ML
8 INJECTION INTRAMUSCULAR; INTRAVENOUS
Status: COMPLETED | OUTPATIENT
Start: 2020-08-28 | End: 2020-08-28

## 2020-08-28 RX ORDER — TALC
6 POWDER (GRAM) TOPICAL NIGHTLY PRN
Status: DISCONTINUED | OUTPATIENT
Start: 2020-08-28 | End: 2020-08-29 | Stop reason: HOSPADM

## 2020-08-28 RX ORDER — PROPOFOL 10 MG/ML
VIAL (ML) INTRAVENOUS
Status: DISCONTINUED | OUTPATIENT
Start: 2020-08-28 | End: 2020-08-28

## 2020-08-28 RX ORDER — HYDROMORPHONE HYDROCHLORIDE 1 MG/ML
0.5 INJECTION, SOLUTION INTRAMUSCULAR; INTRAVENOUS; SUBCUTANEOUS EVERY 4 HOURS PRN
Status: DISCONTINUED | OUTPATIENT
Start: 2020-08-28 | End: 2020-08-29 | Stop reason: HOSPADM

## 2020-08-28 RX ORDER — LIDOCAINE HYDROCHLORIDE 10 MG/ML
1 INJECTION, SOLUTION EPIDURAL; INFILTRATION; INTRACAUDAL; PERINEURAL ONCE
Status: DISCONTINUED | OUTPATIENT
Start: 2020-08-28 | End: 2020-08-29 | Stop reason: HOSPADM

## 2020-08-28 RX ORDER — QUETIAPINE 300 MG/1
300 TABLET, FILM COATED, EXTENDED RELEASE ORAL NIGHTLY
Status: DISCONTINUED | OUTPATIENT
Start: 2020-08-28 | End: 2020-08-29 | Stop reason: HOSPADM

## 2020-08-28 RX ORDER — ACETAMINOPHEN 10 MG/ML
INJECTION, SOLUTION INTRAVENOUS
Status: DISCONTINUED | OUTPATIENT
Start: 2020-08-28 | End: 2020-08-28

## 2020-08-28 RX ORDER — ONDANSETRON 8 MG/1
8 TABLET, ORALLY DISINTEGRATING ORAL EVERY 8 HOURS PRN
Status: DISCONTINUED | OUTPATIENT
Start: 2020-08-28 | End: 2020-08-29 | Stop reason: HOSPADM

## 2020-08-28 RX ORDER — FENTANYL CITRATE 50 UG/ML
INJECTION, SOLUTION INTRAMUSCULAR; INTRAVENOUS
Status: DISCONTINUED | OUTPATIENT
Start: 2020-08-28 | End: 2020-08-28

## 2020-08-28 RX ORDER — MIDAZOLAM HYDROCHLORIDE 1 MG/ML
INJECTION, SOLUTION INTRAMUSCULAR; INTRAVENOUS
Status: DISCONTINUED | OUTPATIENT
Start: 2020-08-28 | End: 2020-08-28

## 2020-08-28 RX ORDER — FENTANYL CITRATE 50 UG/ML
25 INJECTION, SOLUTION INTRAMUSCULAR; INTRAVENOUS EVERY 5 MIN PRN
Status: DISCONTINUED | OUTPATIENT
Start: 2020-08-28 | End: 2020-08-28 | Stop reason: HOSPADM

## 2020-08-28 RX ORDER — HYDROCODONE BITARTRATE AND ACETAMINOPHEN 5; 325 MG/1; MG/1
1 TABLET ORAL EVERY 4 HOURS PRN
Status: DISCONTINUED | OUTPATIENT
Start: 2020-08-28 | End: 2020-08-29 | Stop reason: HOSPADM

## 2020-08-28 RX ORDER — CEFAZOLIN SODIUM 1 G/3ML
2 INJECTION, POWDER, FOR SOLUTION INTRAMUSCULAR; INTRAVENOUS
Status: COMPLETED | OUTPATIENT
Start: 2020-08-28 | End: 2020-08-28

## 2020-08-28 RX ORDER — ACETAMINOPHEN 325 MG/1
650 TABLET ORAL EVERY 8 HOURS PRN
Status: DISCONTINUED | OUTPATIENT
Start: 2020-08-28 | End: 2020-08-29 | Stop reason: HOSPADM

## 2020-08-28 RX ORDER — KETOROLAC TROMETHAMINE 30 MG/ML
10 INJECTION, SOLUTION INTRAMUSCULAR; INTRAVENOUS
Status: COMPLETED | OUTPATIENT
Start: 2020-08-28 | End: 2020-08-28

## 2020-08-28 RX ORDER — PHENYLEPHRINE HYDROCHLORIDE 10 MG/ML
INJECTION INTRAVENOUS
Status: DISCONTINUED | OUTPATIENT
Start: 2020-08-28 | End: 2020-08-28

## 2020-08-28 RX ORDER — SODIUM CHLORIDE, SODIUM LACTATE, POTASSIUM CHLORIDE, CALCIUM CHLORIDE 600; 310; 30; 20 MG/100ML; MG/100ML; MG/100ML; MG/100ML
INJECTION, SOLUTION INTRAVENOUS CONTINUOUS
Status: DISCONTINUED | OUTPATIENT
Start: 2020-08-28 | End: 2020-08-29

## 2020-08-28 RX ORDER — SODIUM CHLORIDE 0.9 % (FLUSH) 0.9 %
10 SYRINGE (ML) INJECTION
Status: DISCONTINUED | OUTPATIENT
Start: 2020-08-28 | End: 2020-08-29 | Stop reason: HOSPADM

## 2020-08-28 RX ORDER — SODIUM CHLORIDE 0.9 % (FLUSH) 0.9 %
2 SYRINGE (ML) INJECTION
Status: DISCONTINUED | OUTPATIENT
Start: 2020-08-28 | End: 2020-08-28

## 2020-08-28 RX ORDER — HYDROCODONE BITARTRATE AND ACETAMINOPHEN 10; 325 MG/1; MG/1
1 TABLET ORAL EVERY 4 HOURS PRN
Status: DISCONTINUED | OUTPATIENT
Start: 2020-08-28 | End: 2020-08-29 | Stop reason: HOSPADM

## 2020-08-28 RX ORDER — DIPHENHYDRAMINE HYDROCHLORIDE 50 MG/ML
25 INJECTION INTRAMUSCULAR; INTRAVENOUS
Status: COMPLETED | OUTPATIENT
Start: 2020-08-28 | End: 2020-08-28

## 2020-08-28 RX ORDER — LIDOCAINE HCL/PF 100 MG/5ML
SYRINGE (ML) INTRAVENOUS
Status: DISCONTINUED | OUTPATIENT
Start: 2020-08-28 | End: 2020-08-28

## 2020-08-28 RX ORDER — FENTANYL CITRATE 50 UG/ML
25 INJECTION, SOLUTION INTRAMUSCULAR; INTRAVENOUS EVERY 5 MIN PRN
Status: DISCONTINUED | OUTPATIENT
Start: 2020-08-28 | End: 2020-08-28

## 2020-08-28 RX ORDER — ONDANSETRON 2 MG/ML
4 INJECTION INTRAMUSCULAR; INTRAVENOUS ONCE AS NEEDED
Status: DISCONTINUED | OUTPATIENT
Start: 2020-08-28 | End: 2020-08-28

## 2020-08-28 RX ORDER — HYDROMORPHONE HYDROCHLORIDE 2 MG/ML
INJECTION, SOLUTION INTRAMUSCULAR; INTRAVENOUS; SUBCUTANEOUS
Status: DISCONTINUED | OUTPATIENT
Start: 2020-08-28 | End: 2020-08-28

## 2020-08-28 RX ORDER — DEXAMETHASONE SODIUM PHOSPHATE 4 MG/ML
INJECTION, SOLUTION INTRA-ARTICULAR; INTRALESIONAL; INTRAMUSCULAR; INTRAVENOUS; SOFT TISSUE
Status: DISCONTINUED | OUTPATIENT
Start: 2020-08-28 | End: 2020-08-28

## 2020-08-28 RX ORDER — LAMOTRIGINE 100 MG/1
200 TABLET ORAL 2 TIMES DAILY
Status: DISCONTINUED | OUTPATIENT
Start: 2020-08-29 | End: 2020-08-29 | Stop reason: HOSPADM

## 2020-08-28 RX ORDER — ROCURONIUM BROMIDE 10 MG/ML
INJECTION, SOLUTION INTRAVENOUS
Status: DISCONTINUED | OUTPATIENT
Start: 2020-08-28 | End: 2020-08-28

## 2020-08-28 RX ORDER — SUCCINYLCHOLINE CHLORIDE 20 MG/ML
INJECTION INTRAMUSCULAR; INTRAVENOUS
Status: DISCONTINUED | OUTPATIENT
Start: 2020-08-28 | End: 2020-08-28

## 2020-08-28 RX ORDER — ONDANSETRON 2 MG/ML
INJECTION INTRAMUSCULAR; INTRAVENOUS
Status: DISCONTINUED | OUTPATIENT
Start: 2020-08-28 | End: 2020-08-28

## 2020-08-28 RX ORDER — SODIUM CHLORIDE 9 MG/ML
INJECTION, SOLUTION INTRAVENOUS CONTINUOUS
Status: DISCONTINUED | OUTPATIENT
Start: 2020-08-28 | End: 2020-08-28

## 2020-08-28 RX ADMIN — KETOROLAC TROMETHAMINE 10 MG: 30 INJECTION, SOLUTION INTRAMUSCULAR at 11:08

## 2020-08-28 RX ADMIN — FENTANYL CITRATE 50 MCG: 50 INJECTION, SOLUTION INTRAMUSCULAR; INTRAVENOUS at 03:08

## 2020-08-28 RX ADMIN — CEFAZOLIN 2 G: 330 INJECTION, POWDER, FOR SOLUTION INTRAMUSCULAR; INTRAVENOUS at 03:08

## 2020-08-28 RX ADMIN — SODIUM CHLORIDE: 0.9 INJECTION, SOLUTION INTRAVENOUS at 03:08

## 2020-08-28 RX ADMIN — PHENYLEPHRINE HYDROCHLORIDE 100 MCG: 10 INJECTION INTRAVENOUS at 03:08

## 2020-08-28 RX ADMIN — DEXAMETHASONE SODIUM PHOSPHATE 4 MG: 4 INJECTION, SOLUTION INTRAMUSCULAR; INTRAVENOUS at 03:08

## 2020-08-28 RX ADMIN — PIPERACILLIN SODIUM AND TAZOBACTAM SODIUM 4.5 G: 4; .5 INJECTION, POWDER, LYOPHILIZED, FOR SOLUTION INTRAVENOUS at 06:08

## 2020-08-28 RX ADMIN — ONDANSETRON 8 MG: 2 INJECTION INTRAMUSCULAR; INTRAVENOUS at 10:08

## 2020-08-28 RX ADMIN — PROPOFOL 20 MG: 10 INJECTION, EMULSION INTRAVENOUS at 03:08

## 2020-08-28 RX ADMIN — ONDANSETRON 4 MG: 2 INJECTION, SOLUTION INTRAMUSCULAR; INTRAVENOUS at 04:08

## 2020-08-28 RX ADMIN — HYDROCODONE BITARTRATE AND ACETAMINOPHEN 1 TABLET: 10; 325 TABLET ORAL at 04:08

## 2020-08-28 RX ADMIN — HYDROMORPHONE HYDROCHLORIDE 0.5 MG: 2 INJECTION, SOLUTION INTRAMUSCULAR; INTRAVENOUS; SUBCUTANEOUS at 04:08

## 2020-08-28 RX ADMIN — ROCURONIUM BROMIDE 30 MG: 10 INJECTION, SOLUTION INTRAVENOUS at 03:08

## 2020-08-28 RX ADMIN — ACETAMINOPHEN 1000 MG: 10 INJECTION, SOLUTION INTRAVENOUS at 03:08

## 2020-08-28 RX ADMIN — FENTANYL CITRATE 25 MCG: 50 INJECTION INTRAMUSCULAR; INTRAVENOUS at 04:08

## 2020-08-28 RX ADMIN — SUGAMMADEX 200 MG: 100 INJECTION, SOLUTION INTRAVENOUS at 04:08

## 2020-08-28 RX ADMIN — MIDAZOLAM HYDROCHLORIDE 2 MG: 1 INJECTION, SOLUTION INTRAMUSCULAR; INTRAVENOUS at 03:08

## 2020-08-28 RX ADMIN — ROCURONIUM BROMIDE 5 MG: 10 INJECTION, SOLUTION INTRAVENOUS at 03:08

## 2020-08-28 RX ADMIN — LIDOCAINE HYDROCHLORIDE 60 MG: 20 INJECTION, SOLUTION INTRAVENOUS at 03:08

## 2020-08-28 RX ADMIN — SODIUM CHLORIDE, SODIUM LACTATE, POTASSIUM CHLORIDE, AND CALCIUM CHLORIDE: .6; .31; .03; .02 INJECTION, SOLUTION INTRAVENOUS at 04:08

## 2020-08-28 RX ADMIN — IOHEXOL 75 ML: 350 INJECTION, SOLUTION INTRAVENOUS at 11:08

## 2020-08-28 RX ADMIN — ROCURONIUM BROMIDE 10 MG: 10 INJECTION, SOLUTION INTRAVENOUS at 03:08

## 2020-08-28 RX ADMIN — SUCCINYLCHOLINE CHLORIDE 120 MG: 20 INJECTION, SOLUTION INTRAMUSCULAR; INTRAVENOUS at 03:08

## 2020-08-28 RX ADMIN — PROPOFOL 120 MG: 10 INJECTION, EMULSION INTRAVENOUS at 03:08

## 2020-08-28 RX ADMIN — DIPHENHYDRAMINE HYDROCHLORIDE 25 MG: 50 INJECTION, SOLUTION INTRAMUSCULAR; INTRAVENOUS at 10:08

## 2020-08-28 NOTE — OP NOTE
DATE OF PROCEDURE: 08/28/2020     PREOPERATIVE DIAGNOSIS: Acute appendicitis.     POSTOPERATIVE DIAGNOSIS: Acute appendicitis.     PROCEDURE PERFORMED: Laparoscopic converted to open appendectomy    ATTENDING SURGEON: David Latham M.D.     RESIDENT SURGEON: Alma Casiano M.D. (RES)     ANESTHESIA: General endotracheal.     ESTIMATED BLOOD LOSS: 20 mL.     FINDINGS: Acute appendicitis     SPECIMEN: Appendix.     DRAINS: None.     COMPLICATIONS: None.     INDICATIONS: Kimberly Jo is a 60 y.o. female who presented to the Emergency Department with lower abdominal pain. The history and exam were consistent with acute appendicitis, which was confirmed by laboratory studies and a CT scan. We recommended laparoscopic appendectomy and the patient agreed to proceed. The patient signed informed consent and expressed understanding of the risks and benefits of surgery.     OPERATIVE PROCEDURE: The patient was identified in Preoperative Holding and brought back to the Operating Room. Placed supine on the operating table and padded appropriately. Monitors were applied and there was smooth induction of general endotracheal anesthesia. A Ospina catheter was placed. The patient's abdomen was prepped and draped in the standard sterile surgical fashion. A time-out was performed and all team members present agreed this was the correct procedure on the correct patient. We also confirmed administration of appropriate preoperative antibiotics.    A 2-cm supraumbilical skin incision was made. Subcutaneous tissue was bluntly dissected. The umbilical stalk was grasped with penetrating towel clip and elevated and a 1.5-cm midline supraumbilical fascial incision was made. The abdomen was bluntly entered under direct vision. A Jon trocar was placed and the abdomen was insufflated with carbon dioxide to a maximum pressure of 15 mmHg. A 10-mm laparoscope was placed and the abdomen was examined. There was no evidence of injury from the  initial trocar placement. Two 5-mm trocars were placed under direct vision through separate stab incisions, one in the suprapubic area avoiding the dome of the bladder and one infraumbilical. We directed our attention to the right lower quadrant. The appendix was identified and noted to have significant inflammatory change. Likely demonstrating chronic disease. The appendix was densely adhesive to the surrounding tissue limiting visualization. Due to this we decided to convert to open. A midline incision was made connecting the supra and infra umbilical incisions. Electrocautery was used to dissect down through the subcutaneous tissue and fascia. The appendix was pulled through the incision. The mesoappendix was identified and tied off with 2-0 vicryl tie. The base of the appendix was divided from the cecum with a TA stapler with a blue load. The appendix was removed. The staple line was examined and any bleeding was managed with electrocautery. Once hemostasis was achieved, the bowel was returned to the abdominal cavity. The incisions were injected with Marcaine. The fascia was closed with non looped PDS. The skin was closed with staples. Sterile dressings were applied. The patient's Ospina catheter was removed. The patient was extubated in the Operating Room and transported to the Recovery Room in stable condition. All sponge, instrument and needle counts were correct at the end of the case.    Alma Casiano MD  General Surgery PGY2  Pager: [phone]

## 2020-08-28 NOTE — TRANSFER OF CARE
"Anesthesia Transfer of Care Note    Patient: Kimberly Jo    Procedure(s) Performed: Procedure(s) (LRB):  APPENDECTOMY, LAPAROSCOPIC CONVERTED TO OPEN (N/A)    Patient location: PACU    Anesthesia Type: general    Transport from OR: Transported from OR on 6-10 L/min O2 by face mask with adequate spontaneous ventilation    Post pain: adequate analgesia    Post assessment: no apparent anesthetic complications and tolerated procedure well    Post vital signs: stable    Level of consciousness: sedated    Nausea/Vomiting: no nausea/vomiting    Complications: none    Transfer of care protocol was followed      Last vitals:   Visit Vitals  BP (!) 102/54 (BP Location: Right arm, Patient Position: Lying)   Pulse 84   Temp 36.6 °C (97.9 °F) (Temporal)   Resp 18   Ht 5' 3" (1.6 m)   Wt 59 kg (130 lb)   SpO2 97%   Breastfeeding No   BMI 23.03 kg/m²     "

## 2020-08-28 NOTE — ANESTHESIA POSTPROCEDURE EVALUATION
Anesthesia Post Evaluation    Patient: Kimberly Jo    Procedure(s) Performed: Procedure(s) (LRB):  APPENDECTOMY, LAPAROSCOPIC CONVERTED TO OPEN (N/A)    Final Anesthesia Type: general    Patient location during evaluation: PACU  Patient participation: Yes- Able to Participate  Level of consciousness: awake and alert and oriented  Post-procedure vital signs: reviewed and stable  Pain management: adequate  Airway patency: patent    PONV status at discharge: No PONV  Anesthetic complications: no      Cardiovascular status: blood pressure returned to baseline  Respiratory status: unassisted  Hydration status: euvolemic  Follow-up not needed.          Vitals Value Taken Time   /58 08/28/20 1805   Temp 35.9 °C (96.7 °F) 08/28/20 1805   Pulse 81 08/28/20 1805   Resp 16 08/28/20 1805   SpO2 92 % 08/28/20 1805         Event Time   Out of Recovery 17:00:00         Pain/Compa Score: Pain Rating Prior to Med Admin: 8 (8/28/2020  4:45 PM)  Compa Score: 8 (8/28/2020  4:31 PM)

## 2020-08-28 NOTE — ED PROVIDER NOTES
Encounter Date: 8/28/2020       History     Chief Complaint   Patient presents with    Abdominal Pain     rlq     9:30 AM  Patient is a 60-year-old female with a history of depression, HLD, SVT, slow transit constipation presents the ED with right lower quadrant pain onset yesterday evening.  Had nausea earlier that resolved.  Never vomited.  States that she has had a kidney stone in the past before, but this feels different in the sense that she does not have nausea, vomiting, or diaphoresis with her episodes.  She states that her pain has been constant and not moving in the right lower quadrant that is not typical of her symptoms.  She has endorses 7/10 pain to the area.  Denies any fever, chills, dysuria, frequency, hematuria, melena, blood in stool, or diarrhea.  Stay history of constipation, but states that her bowel movements are regular due to the current regimen that she is on.  She did not take anything for her symptoms today.    Future Appointments  9/3/2020   11:00 AM   LAB, [facility]       [facility]  9/8/2020   2:20 PM    Ronna Dalal MD         [facility]          Review of patient's allergies indicates:   Allergen Reactions    Iodine      Other reaction(s): Vomiting    Sulfa (sulfonamide antibiotics)      Other reaction(s): Swelling     Past Medical History:   Diagnosis Date    Depression     Dr Lourdes Mack & therapist [place]    History of herpes zoster 06/21/2017    tx GYN    Mixed hyperlipidemia 2/2/2016    Moderate episode of recurrent major depressive disorder 8/16/2016    Slow transit constipation 2/2/2016    CS 50 yo    SVT (supraventricular tachycardia)     2015 ablated with Adenosine EMT (too much caffeine), cardiologist rec bblocker if it recurrs    Vitamin D deficiency     2015 OTC suppl    Word finding difficulty 07/10/2019    MRI nml 2019, refer to Neurol 7/2019     History reviewed. No pertinent surgical history.  Family History  "  Problem Relation Age of Onset    Melanoma Father     Cancer Father     Heart disease Brother 28         "blood clot" after injury hit head    Hypertension Mother     Alzheimer's disease Mother     Breast cancer Neg Hx     Colon cancer Neg Hx     Ovarian cancer Neg Hx      Social History     Tobacco Use    Smoking status: Never Smoker    Smokeless tobacco: Never Used   Substance Use Topics    Alcohol use: No     Alcohol/week: 2.5 standard drinks     Types: 3 Standard drinks or equivalent per week    Drug use: No     Review of Systems   Constitutional: Negative for fever.   HENT: Negative for sore throat.    Respiratory: Negative for shortness of breath.    Cardiovascular: Negative for chest pain.   Gastrointestinal: Positive for abdominal pain and nausea. Negative for vomiting.   Genitourinary: Negative for dysuria.   Musculoskeletal: Negative for back pain.   Skin: Negative for rash.   Neurological: Negative for weakness.   Hematological: Does not bruise/bleed easily.       Physical Exam     Initial Vitals [08/28/20 0844]   BP Pulse Resp Temp SpO2   106/61 96 18 98 °F (36.7 °C) 96 %      MAP       --         Physical Exam    Vitals reviewed.  Constitutional: She appears well-developed and well-nourished. She is not diaphoretic. She is cooperative.  Non-toxic appearance. She does not have a sickly appearance. She does not appear ill. No distress. Face mask in place.   HENT:   Head: Normocephalic and atraumatic.   Nose: Nose normal.   Eyes: Conjunctivae and EOM are normal.   Neck: Normal range of motion.   Cardiovascular: Normal rate.   Pulmonary/Chest: No respiratory distress.   Abdominal: Soft. She exhibits no distension. There is abdominal tenderness. There is guarding and tenderness at McBurney's point. There is no rebound.   Musculoskeletal: Normal range of motion.   Neurological: She is alert. She has normal strength.   Skin: Skin is warm and dry. No erythema.   Psychiatric: She has a normal mood " and affect.         ED Course   Procedures  Labs Reviewed   URINALYSIS, REFLEX TO URINE CULTURE - Abnormal; Notable for the following components:       Result Value    Appearance, UA Hazy (*)     Occult Blood UA 2+ (*)     All other components within normal limits    Narrative:     Specimen Source->Urine   CBC W/ AUTO DIFFERENTIAL - Abnormal; Notable for the following components:    WBC 15.95 (*)     RBC 3.97 (*)     Mean Corpuscular Volume 101 (*)     Mean Corpuscular Hemoglobin 33.0 (*)     Gran # (ANC) 12.1 (*)     Immature Grans (Abs) 0.08 (*)     Gran% 76.0 (*)     Lymph% 16.7 (*)     All other components within normal limits   URINALYSIS MICROSCOPIC - Abnormal; Notable for the following components:    RBC, UA 8 (*)     Yeast, UA Few (*)     All other components within normal limits    Narrative:     Specimen Source->Urine   COMPREHENSIVE METABOLIC PANEL   SARS-COV-2 RNA AMPLIFICATION, QUAL          Imaging Results           CT Abdomen Pelvis With Contrast (Final result)  Result time 08/28/20 11:43:24    Final result by Miller Cohn DO (08/28/20 11:43:24)                 Impression:      Findings consistent with acute uncomplicated appendicitis.  No evidence of abscess or perforation.    This report was flagged in Epic as abnormal.    Dr. Cohn discussed these findings with KECIA Quigley by telephone at 11:40 on 08/28/2020.      Electronically signed by: Miller Cohn  Date:    08/28/2020  Time:    11:43             Narrative:    EXAMINATION:  CT ABDOMEN PELVIS WITH CONTRAST    CLINICAL HISTORY:  RLQ abdominal pain, appendicitis suspected.    TECHNIQUE:  Axial CT images with sagittal and coronal reformats were obtained of the abdomen and pelvis from the hemidiaphragms through the symphysis pubis after the administration of 75mL Omnipaque 350.    COMPARISON:  None available.    FINDINGS:  Lung Bases: Clear. The pleural spaces are clear.    Distal esophagus: Normal.    Heart: Heart size is normal. No pericardial  effusion.    Liver: Normal.    Biliary tract: No intrahepatic or extrahepatic biliary ductal dilatation.    Gallbladder: No radiodense gallstone. No wall thickening or pericholecystic fluid.    Pancreas: Normal. No pancreatic ductal dilatation.    Spleen: Normal.    Adrenals: Normal.    Kidneys and urinary collecting systems: There is a simple cyst within the right kidney midpole.  No hydronephrosis or urolithiasis.    Lymph nodes: None enlarged.    Stomach and bowel: The appendix is fluid-filled and dilated measuring 1.2 cm in diameter.  There is thickening and enhancement of the wall of the appendix with adjacent periappendiceal fat stranding.  There is no focal fluid collection to suggest abscess.  There is no evidence of free intraperitoneal air to suggest perforation.  The stomach is normal.  No evidence of obstruction.    Peritoneum and mesentery: No ascites.  No abdominal fluid collection.    Vasculature: Normal.    Urinary bladder: Normal.    Reproductive organs: The uterus and bilateral adnexae are unremarkable.    Body wall: No abnormality.    Musculoskeletal: No aggressive osseous lesion.                                 Medical Decision Making:   History:   Old Medical Records: I decided to obtain old medical records.  Old Records Summarized: records from clinic visits and records from previous admission(s).       <> Summary of Records: Ultrasound retroperitoneal in the past did not show any stones or blockages.  Initial Assessment:   Patient is a 60-year-old female with a history of depression, HLD, SVT, slow transit constipation, nephrolithiasis presents to the ED with right lower quadrant pain onset yesterday evening.   Differential Diagnosis:   Includes but is not limited to appendicitis given tenderness at McBurney's point, colitis, diverticulitis, constipation, strain, sprain, less likely perforation given no tenderness in other quadrants, UTI, nephrolithiasis.  Clinical Tests:   Lab Tests: Reviewed  and Ordered  Radiological Study: Ordered and Reviewed  ED Management:  Will initiate workup, obtain CT abdomen pelvis with contrast, and continue monitor.  Patient declines any pain medication in the ED at this time.  She was made aware that she should notify us if she requires pain medication for her symptoms.    CBC with leukocytosis.  No anemia.  CMP without electrolyte abnormalities.  No kidney injury.  No hyperbilirubinemia or elevated liver enzymes.  UA without significant signs of infection.    Arian Jo, sister in law,'s phone number is [phone].    12:00 PM  Radiology notified me about abnormal results on CT.  Patient was updated with her imaging and labs.  She has uncomplicated acute appendicitis.  Patient still in some pain.  She is open to receiving medication at this time.  Will give Toradol IV.  Case was discussed with General Surgery on call who will evaluate and most likely placed in observation for further evaluation management.  Other:   I discussed test(s) with the performing physician.  I have discussed this case with another health care provider.    I have reviewed patient's chart and discussed this case with my supervising MD.     Gina Quigley PA-C  Emergent Department  [facility]  Spectralink #[id number] or #[phone]                                   Clinical Impression:       ICD-10-CM ICD-9-CM   1. Acute appendicitis with localized peritonitis, without perforation, abscess, or gangrene  K35.30 540.9         Disposition:   Disposition: Placed in Observation  Condition: Stable     ED Disposition Condition    Observation                           Gina Quigley PA-C  08/28/20 0509       Gina Quigley PA-C  09/01/20 1103

## 2020-08-28 NOTE — BRIEF OP NOTE
[facility]  Brief Operative Note    SUMMARY     Surgery Date: 8/28/2020     Surgeon(s) and Role:     * David Eldridge MD - Primary     * Alma Casiano MD - Resident - Assisting        Pre-op Diagnosis:  Acute appendicitis with localized peritonitis, without perforation, abscess, or gangrene [K35.30]    Post-op Diagnosis:  Post-Op Diagnosis Codes:     * Acute appendicitis with localized peritonitis, without perforation, abscess, or gangrene [K35.30]    Procedure(s) (LRB):  APPENDECTOMY, LAPAROSCOPIC CONVERTED TO OPEN (N/A)    Anesthesia: General    Description of Procedure: lap converted to open appendectomy.     Description of the findings of the procedure: inflamed appendix with adhesions to surrounding bowel and mesentery. No gross perforation or abscess     Estimated Blood Loss:  Estimated Blood Loss has been documented.         Specimens:   Specimen (12h ago, onward)    None          [id number]

## 2020-08-28 NOTE — ASSESSMENT & PLAN NOTE
60 y.o. female w/ acute appendicitis    Will take to the OR for lap appendectomy today  Placed in observation  NPO  Informed consent obtained

## 2020-08-28 NOTE — H&P
[facility]  General Surgery  History & Physical    Patient Name: Kimberly Jo  MRN: [id number]  Admission Date: 8/28/2020  Attending Physician: David Eldridge MD  Primary Care Provider: Ronna Dalal MD    Patient information was obtained from patient and ER records.     Subjective:     Chief Complaint/Reason for Admission: acute appendicitis    History of Present Illness: 60 y.o. female w/ hx of bipolar disease and HLD who presented to the ED w/ the chief complaint of RLQ abdominal pain x1 day. Pain onset yesterday morning. She reports nausea and 1 episode of non bloody/bilious emesis overnight. The pain is constant, non radiating and 7/10. She did not take anything for her symptoms. Denies fever, chills, constipation, diarrhea, dysuria, hematuria, melena. WBC 15.95 CT A/P obtained in the ED demonstrated a fluid-filled, dilated appendix measuring 1.2 cm in diameter w/ thickening and enhancement of the wall and adjacent periappendiceal fat stranding.  There is no focal fluid collection to suggest abscess.  There is no evidence of free intraperitoneal air to suggest perforation    No current facility-administered medications on file prior to encounter.      Current Outpatient Medications on File Prior to Encounter   Medication Sig    citalopram (CELEXA) 20 MG tablet Take 20 mg by mouth once daily.    lamotrigine (LAMICTAL) 200 MG tablet 2 (two) times daily.     QUEtiapine (SEROQUEL XR) 300 MG Tb24     biotin 2,500 mcg Tab Take 50,000 mcg by mouth once daily.    cholecalciferol, vitamin D3, (VITAMIN D3) 2,000 unit Tab Take by mouth once daily.    diazePAM (VALIUM) 5 MG tablet Take 5 mg by mouth every 6 (six) hours as needed.     rosuvastatin (CRESTOR) 10 MG tablet Take 1 tablet by mouth once daily    varicella-zoster gE-AS01B, PF, (SHINGRIX, PF,) 50 mcg/0.5 mL injection Inject into the muscle. (Patient not taking: Reported on 2/10/2020)       Review of patient's allergies indicates:    Allergen Reactions    Iodine      Other reaction(s): Vomiting    Sulfa (sulfonamide antibiotics)      Other reaction(s): Swelling       Past Medical History:   Diagnosis Date    Depression     Dr Lourdes Mack & therapist [place]    History of herpes zoster 06/21/2017    tx GYN    Mixed hyperlipidemia 2/2/2016    Moderate episode of recurrent major depressive disorder 8/16/2016    Slow transit constipation 2/2/2016    CS 52 yo    SVT (supraventricular tachycardia)     2015 ablated with Adenosine EMT (too much caffeine), cardiologist rec bblocker if it recurrs    Vitamin D deficiency     2015 OTC suppl    Word finding difficulty 07/10/2019    MRI nml 2019, refer to Neurol 7/2019     History reviewed. No pertinent surgical history.  Family History     Problem Relation (Age of Onset)    Alzheimer's disease Mother    Cancer Father    Heart disease Brother (28)    Hypertension Mother    Melanoma Father        Tobacco Use    Smoking status: Never Smoker    Smokeless tobacco: Never Used   Substance and Sexual Activity    Alcohol use: No     Alcohol/week: 2.5 standard drinks     Types: 3 Standard drinks or equivalent per week    Drug use: No    Sexual activity: Yes     Partners: Male     Birth control/protection: Post-menopausal     Comment:      Review of Systems   Constitutional: Negative for chills and fever.   HENT: Negative for congestion and sore throat.    Respiratory: Negative for cough and shortness of breath.    Cardiovascular: Negative for chest pain and palpitations.   Gastrointestinal: Positive for abdominal pain (RLQ), nausea and vomiting. Negative for blood in stool, constipation and diarrhea.   Genitourinary: Negative for dysuria, hematuria and urgency.   Musculoskeletal: Negative for arthralgias and myalgias.   Allergic/Immunologic: Negative for environmental allergies, food allergies and immunocompromised state.   Neurological: Negative for light-headedness and headaches.    Psychiatric/Behavioral: Negative for confusion.     Objective:     Vital Signs (Most Recent):  Temp: 98.9 °F (37.2 °C) (08/28/20 1054)  Pulse: 89 (08/28/20 1054)  Resp: 18 (08/28/20 0844)  BP: (!) 119/57 (08/28/20 1054)  SpO2: 99 % (08/28/20 1054) Vital Signs (24h Range):  Temp:  [98 °F (36.7 °C)-98.9 °F (37.2 °C)] 98.9 °F (37.2 °C)  Pulse:  [89-96] 89  Resp:  [18] 18  SpO2:  [96 %-99 %] 99 %  BP: (106-119)/(57-61) 119/57     Weight: 59 kg (130 lb)  Body mass index is 23.03 kg/m².    Physical Exam  Vitals signs reviewed.   Constitutional:       Appearance: Normal appearance.   HENT:      Head: Normocephalic.   Cardiovascular:      Rate and Rhythm: Normal rate and regular rhythm.   Pulmonary:      Effort: Pulmonary effort is normal. No respiratory distress.   Abdominal:      General: Abdomen is flat. There is no distension.      Palpations: Abdomen is soft. There is no mass.      Tenderness: There is abdominal tenderness (RLQ).   Skin:     General: Skin is warm and dry.   Neurological:      General: No focal deficit present.      Mental Status: She is alert and oriented to person, place, and time.   Psychiatric:         Mood and Affect: Mood normal.         Thought Content: Thought content normal.         Significant Labs:  CBC:   Recent Labs   Lab 08/28/20  0936   WBC 15.95*   RBC 3.97*   HGB 13.1   HCT 39.9      *   MCH 33.0*   MCHC 32.8     CMP:   Recent Labs   Lab 08/28/20  0936      CALCIUM 9.6   ALBUMIN 4.1   PROT 7.7      K 4.0   CO2 27      BUN 18   CREATININE 1.0   ALKPHOS 98   ALT 24   AST 19   BILITOT 0.5       Significant Diagnostics:  I have reviewed all pertinent imaging results/findings within the past 24 hours.   Stomach and bowel: The appendix is fluid-filled and dilated measuring 1.2 cm in diameter.  There is thickening and enhancement of the wall of the appendix with adjacent periappendiceal fat stranding.  There is no focal fluid collection to suggest abscess.   There is no evidence of free intraperitoneal air to suggest perforation.  The stomach is normal.  No evidence of obstruction.    Assessment/Plan:     * Acute appendicitis with localized peritonitis, without perforation, abscess, or gangrene  60 y.o. female w/ acute appendicitis    Will take to the OR for lap appendectomy today  Placed in observation  NPO  Informed consent obtained      VTE Risk Mitigation (From admission, onward)    None          Alma Casiano MD  General Surgery  [facility]

## 2020-08-28 NOTE — ANESTHESIA PROCEDURE NOTES
Intubation  Performed by: Cheyanne Nelson CRNA  Authorized by: Hugo Ritter MD     Intubation:     Induction:  Intravenous    Intubated:  Postinduction    Mask Ventilation:  Easy mask    Attempts:  1    Attempted By:  CRNA    Method of Intubation:  Direct    Blade:  Eric 3    Laryngeal View Grade: Grade I - full view of chords      Difficult Airway Encountered?: No      Complications:  None    Airway Device:  Oral endotracheal tube    Airway Device Size:  7.0    Style/Cuff Inflation:  Cuffed    Inflation Amount (mL):  4    Tube secured:  21    Secured at:  The lips    Placement Verified By:  Capnometry    Complicating Factors:  None    Findings Post-Intubation:  BS equal bilateral and atraumatic/condition of teeth unchanged

## 2020-08-28 NOTE — SUBJECTIVE & OBJECTIVE
No current facility-administered medications on file prior to encounter.      Current Outpatient Medications on File Prior to Encounter   Medication Sig    citalopram (CELEXA) 20 MG tablet Take 20 mg by mouth once daily.    lamotrigine (LAMICTAL) 200 MG tablet 2 (two) times daily.     QUEtiapine (SEROQUEL XR) 300 MG Tb24     biotin 2,500 mcg Tab Take 50,000 mcg by mouth once daily.    cholecalciferol, vitamin D3, (VITAMIN D3) 2,000 unit Tab Take by mouth once daily.    diazePAM (VALIUM) 5 MG tablet Take 5 mg by mouth every 6 (six) hours as needed.     rosuvastatin (CRESTOR) 10 MG tablet Take 1 tablet by mouth once daily    varicella-zoster gE-AS01B, PF, (SHINGRIX, PF,) 50 mcg/0.5 mL injection Inject into the muscle. (Patient not taking: Reported on 2/10/2020)       Review of patient's allergies indicates:   Allergen Reactions    Iodine      Other reaction(s): Vomiting    Sulfa (sulfonamide antibiotics)      Other reaction(s): Swelling       Past Medical History:   Diagnosis Date    Depression     Dr Lourdes Mack & therapist [place]    History of herpes zoster 06/21/2017    tx GYN    Mixed hyperlipidemia 2/2/2016    Moderate episode of recurrent major depressive disorder 8/16/2016    Slow transit constipation 2/2/2016    CS 52 yo    SVT (supraventricular tachycardia)     2015 ablated with Adenosine EMT (too much caffeine), cardiologist rec bblocker if it recurrs    Vitamin D deficiency     2015 OTC suppl    Word finding difficulty 07/10/2019    MRI nml 2019, refer to Neurol 7/2019     History reviewed. No pertinent surgical history.  Family History     Problem Relation (Age of Onset)    Alzheimer's disease Mother    Cancer Father    Heart disease Brother (28)    Hypertension Mother    Melanoma Father        Tobacco Use    Smoking status: Never Smoker    Smokeless tobacco: Never Used   Substance and Sexual Activity    Alcohol use: No     Alcohol/week: 2.5 standard drinks     Types: 3 Standard drinks  or equivalent per week    Drug use: No    Sexual activity: Yes     Partners: Male     Birth control/protection: Post-menopausal     Comment:      Review of Systems   Constitutional: Negative for chills and fever.   HENT: Negative for congestion and sore throat.    Respiratory: Negative for cough and shortness of breath.    Cardiovascular: Negative for chest pain and palpitations.   Gastrointestinal: Positive for abdominal pain (RLQ), nausea and vomiting. Negative for blood in stool, constipation and diarrhea.   Genitourinary: Negative for dysuria, hematuria and urgency.   Musculoskeletal: Negative for arthralgias and myalgias.   Allergic/Immunologic: Negative for environmental allergies, food allergies and immunocompromised state.   Neurological: Negative for light-headedness and headaches.   Psychiatric/Behavioral: Negative for confusion.     Objective:     Vital Signs (Most Recent):  Temp: 98.9 °F (37.2 °C) (08/28/20 1054)  Pulse: 89 (08/28/20 1054)  Resp: 18 (08/28/20 0844)  BP: (!) 119/57 (08/28/20 1054)  SpO2: 99 % (08/28/20 1054) Vital Signs (24h Range):  Temp:  [98 °F (36.7 °C)-98.9 °F (37.2 °C)] 98.9 °F (37.2 °C)  Pulse:  [89-96] 89  Resp:  [18] 18  SpO2:  [96 %-99 %] 99 %  BP: (106-119)/(57-61) 119/57     Weight: 59 kg (130 lb)  Body mass index is 23.03 kg/m².    Physical Exam  Vitals signs reviewed.   Constitutional:       Appearance: Normal appearance.   HENT:      Head: Normocephalic.   Cardiovascular:      Rate and Rhythm: Normal rate and regular rhythm.   Pulmonary:      Effort: Pulmonary effort is normal. No respiratory distress.   Abdominal:      General: Abdomen is flat. There is no distension.      Palpations: Abdomen is soft. There is no mass.      Tenderness: There is abdominal tenderness (RLQ).   Skin:     General: Skin is warm and dry.   Neurological:      General: No focal deficit present.      Mental Status: She is alert and oriented to person, place, and time.   Psychiatric:          Mood and Affect: Mood normal.         Thought Content: Thought content normal.         Significant Labs:  CBC:   Recent Labs   Lab 08/28/20  0936   WBC 15.95*   RBC 3.97*   HGB 13.1   HCT 39.9      *   MCH 33.0*   MCHC 32.8     CMP:   Recent Labs   Lab 08/28/20  0936      CALCIUM 9.6   ALBUMIN 4.1   PROT 7.7      K 4.0   CO2 27      BUN 18   CREATININE 1.0   ALKPHOS 98   ALT 24   AST 19   BILITOT 0.5       Significant Diagnostics:  I have reviewed all pertinent imaging results/findings within the past 24 hours.   Stomach and bowel: The appendix is fluid-filled and dilated measuring 1.2 cm in diameter.  There is thickening and enhancement of the wall of the appendix with adjacent periappendiceal fat stranding.  There is no focal fluid collection to suggest abscess.  There is no evidence of free intraperitoneal air to suggest perforation.  The stomach is normal.  No evidence of obstruction.

## 2020-08-28 NOTE — ED NOTES
Patient identifiers verified and correct for MS Jo  C/C:ABd pain SEE NN  APPEARANCE: awake and alert in NAD.  SKIN: warm, dry and intact. No breakdown or bruising.  MUSCULOSKELETAL: Patient moving all extremities spontaneously, no obvious swelling or deformities noted. Ambulates independently.  RESPIRATORY: Denies shortness of breath.Respirations unlabored. Denies fevers  CARDIAC: Denies CP, 2+ distal pulses; no peripheral edema  ABDOMEN: Abdomen soft, pain to RUQ, denies n/v/d, pain worse with mvmt  : voids spontaneously, denies difficulty  Neurologic: AAO x 4; follows commands equal strength in all extremities; denies numbness/tingling. Denies dizziness Denies weakness

## 2020-08-28 NOTE — ED NOTES
Report given to OR. Pt unable to change into gown in CCR.  Offered to send PT to room, requested pt come directly to the OR.

## 2020-08-28 NOTE — HPI
60 y.o. female w/ hx of bipolar disease and HLD who presented to the ED w/ the chief complaint of RLQ abdominal pain x1 day. Pain onset yesterday morning. She reports nausea and 1 episode of non bloody/bilious emesis overnight. The pain is constant, non radiating and 7/10. She did not take anything for her symptoms. Denies fever, chills, constipation, diarrhea, dysuria, hematuria, melena. WBC 15.95 CT A/P obtained in the ED demonstrated a fluid-filled, dilated appendix measuring 1.2 cm in diameter w/ thickening and enhancement of the wall and adjacent periappendiceal fat stranding.  There is no focal fluid collection to suggest abscess.  There is no evidence of free intraperitoneal air to suggest perforation

## 2020-08-28 NOTE — NURSING TRANSFER
Nursing Transfer Note      8/28/2020     Transfer To: 502    Transfer via stretcher    Transfer with 2L NC to O2    Transported by pct    Medicines sent: iv fluids    Chart send with patient: Yes    Notified: spouse    Patient reassessed at: 8/28/20 see flowsheets

## 2020-08-28 NOTE — ED TRIAGE NOTES
"Patient states RUQ abd pain , onset last night. Pain worse with mvmt and pulling self up. Denies nausea or vomiting today, last BM yesterday "normal" Denies  urinary symptoms, denies fevers.  "

## 2020-08-28 NOTE — CONSULTS
See H&P dated 8/28 for full consult note.    Alma Casiano MD  General Surgery PGY2  Pager: [phone]

## 2020-08-29 VITALS
TEMPERATURE: 98 F | WEIGHT: 133.38 LBS | SYSTOLIC BLOOD PRESSURE: 97 MMHG | BODY MASS INDEX: 23.63 KG/M2 | DIASTOLIC BLOOD PRESSURE: 56 MMHG | HEART RATE: 86 BPM | OXYGEN SATURATION: 93 % | RESPIRATION RATE: 18 BRPM | HEIGHT: 63 IN

## 2020-08-29 LAB
ANION GAP SERPL CALC-SCNC: 8 MMOL/L (ref 8–16)
BASOPHILS # BLD AUTO: 0.02 K/UL (ref 0–0.2)
BASOPHILS NFR BLD: 0.1 % (ref 0–1.9)
BUN SERPL-MCNC: 15 MG/DL (ref 8–23)
CALCIUM SERPL-MCNC: 8.7 MG/DL (ref 8.7–10.5)
CHLORIDE SERPL-SCNC: 103 MMOL/L (ref 95–110)
CO2 SERPL-SCNC: 25 MMOL/L (ref 23–29)
CREAT SERPL-MCNC: 0.8 MG/DL (ref 0.5–1.4)
DIFFERENTIAL METHOD: ABNORMAL
EOSINOPHIL # BLD AUTO: 0 K/UL (ref 0–0.5)
EOSINOPHIL NFR BLD: 0 % (ref 0–8)
ERYTHROCYTE [DISTWIDTH] IN BLOOD BY AUTOMATED COUNT: 12.9 % (ref 11.5–14.5)
EST. GFR  (AFRICAN AMERICAN): >60 ML/MIN/1.73 M^2
EST. GFR  (NON AFRICAN AMERICAN): >60 ML/MIN/1.73 M^2
GLUCOSE SERPL-MCNC: 128 MG/DL (ref 70–110)
HCT VFR BLD AUTO: 34.9 % (ref 37–48.5)
HGB BLD-MCNC: 11 G/DL (ref 12–16)
IMM GRANULOCYTES # BLD AUTO: 0.06 K/UL (ref 0–0.04)
IMM GRANULOCYTES NFR BLD AUTO: 0.4 % (ref 0–0.5)
LYMPHOCYTES # BLD AUTO: 2 K/UL (ref 1–4.8)
LYMPHOCYTES NFR BLD: 13.5 % (ref 18–48)
MAGNESIUM SERPL-MCNC: 1.9 MG/DL (ref 1.6–2.6)
MCH RBC QN AUTO: 32.3 PG (ref 27–31)
MCHC RBC AUTO-ENTMCNC: 31.5 G/DL (ref 32–36)
MCV RBC AUTO: 102 FL (ref 82–98)
MONOCYTES # BLD AUTO: 0.9 K/UL (ref 0.3–1)
MONOCYTES NFR BLD: 5.8 % (ref 4–15)
NEUTROPHILS # BLD AUTO: 12.1 K/UL (ref 1.8–7.7)
NEUTROPHILS NFR BLD: 80.2 % (ref 38–73)
NRBC BLD-RTO: 0 /100 WBC
PHOSPHATE SERPL-MCNC: 2.6 MG/DL (ref 2.7–4.5)
PLATELET # BLD AUTO: 254 K/UL (ref 150–350)
PMV BLD AUTO: 9.9 FL (ref 9.2–12.9)
POTASSIUM SERPL-SCNC: 4.3 MMOL/L (ref 3.5–5.1)
RBC # BLD AUTO: 3.41 M/UL (ref 4–5.4)
SODIUM SERPL-SCNC: 136 MMOL/L (ref 136–145)
WBC # BLD AUTO: 15.08 K/UL (ref 3.9–12.7)

## 2020-08-29 PROCEDURE — 85025 COMPLETE CBC W/AUTO DIFF WBC: CPT

## 2020-08-29 PROCEDURE — 83735 ASSAY OF MAGNESIUM: CPT

## 2020-08-29 PROCEDURE — 96376 TX/PRO/DX INJ SAME DRUG ADON: CPT

## 2020-08-29 PROCEDURE — 63600175 PHARM REV CODE 636 W HCPCS: Performed by: STUDENT IN AN ORGANIZED HEALTH CARE EDUCATION/TRAINING PROGRAM

## 2020-08-29 PROCEDURE — 80048 BASIC METABOLIC PNL TOTAL CA: CPT

## 2020-08-29 PROCEDURE — G0378 HOSPITAL OBSERVATION PER HR: HCPCS

## 2020-08-29 PROCEDURE — 84100 ASSAY OF PHOSPHORUS: CPT

## 2020-08-29 PROCEDURE — 36415 COLL VENOUS BLD VENIPUNCTURE: CPT

## 2020-08-29 PROCEDURE — 25000003 PHARM REV CODE 250: Performed by: STUDENT IN AN ORGANIZED HEALTH CARE EDUCATION/TRAINING PROGRAM

## 2020-08-29 RX ORDER — HYDROCODONE BITARTRATE AND ACETAMINOPHEN 5; 325 MG/1; MG/1
1 TABLET ORAL EVERY 4 HOURS PRN
Qty: 28 TABLET | Refills: 0 | Status: SHIPPED | OUTPATIENT
Start: 2020-08-29 | End: 2020-08-29

## 2020-08-29 RX ORDER — CIPROFLOXACIN 500 MG/1
500 TABLET ORAL EVERY 12 HOURS
Qty: 10 TABLET | Refills: 0 | Status: SHIPPED | OUTPATIENT
Start: 2020-08-29 | End: 2020-09-03

## 2020-08-29 RX ORDER — HYDROCODONE BITARTRATE AND ACETAMINOPHEN 5; 325 MG/1; MG/1
1 TABLET ORAL EVERY 4 HOURS PRN
Qty: 28 TABLET | Refills: 0 | Status: SHIPPED | OUTPATIENT
Start: 2020-08-29 | End: 2021-01-05

## 2020-08-29 RX ORDER — METRONIDAZOLE 500 MG/1
500 TABLET ORAL EVERY 8 HOURS
Qty: 15 TABLET | Refills: 0 | Status: SHIPPED | OUTPATIENT
Start: 2020-08-29 | End: 2020-09-03

## 2020-08-29 RX ADMIN — PIPERACILLIN SODIUM AND TAZOBACTAM SODIUM 4.5 G: 4; .5 INJECTION, POWDER, LYOPHILIZED, FOR SOLUTION INTRAVENOUS at 09:08

## 2020-08-29 RX ADMIN — PIPERACILLIN SODIUM AND TAZOBACTAM SODIUM 4.5 G: 4; .5 INJECTION, POWDER, LYOPHILIZED, FOR SOLUTION INTRAVENOUS at 02:08

## 2020-08-29 RX ADMIN — LAMOTRIGINE 200 MG: 100 TABLET ORAL at 09:08

## 2020-08-29 RX ADMIN — HYDROCODONE BITARTRATE AND ACETAMINOPHEN 1 TABLET: 5; 325 TABLET ORAL at 05:08

## 2020-08-29 NOTE — NURSING
Pt received last dose of zosyn, received and verbalized understanding of AVS, IV removed, x3 prescriptions delivered to bedside, refused wheelchair.

## 2020-08-29 NOTE — DISCHARGE SUMMARY
[facility]  General Surgery  Discharge Summary      Patient Name: Kimberly Jo  MRN: [id number]  Admission Date: 8/28/2020  Hospital Length of Stay: 0 days  Discharge Date and Time:  08/29/2020 9:41 AM  Attending Physician: David Eldridge MD   Discharging Provider: Sofi Plunkett MD  Primary Care Provider: Ronna Dalal MD     HPI:   60 y.o. female w/ hx of bipolar disease and HLD who presented to the ED w/ the chief complaint of RLQ abdominal pain x1 day. Pain onset yesterday morning. She reports nausea and 1 episode of non bloody/bilious emesis overnight. The pain is constant, non radiating and 7/10. She did not take anything for her symptoms. Denies fever, chills, constipation, diarrhea, dysuria, hematuria, melena. WBC 15.95 CT A/P obtained in the ED demonstrated a fluid-filled, dilated appendix measuring 1.2 cm in diameter w/ thickening and enhancement of the wall and adjacent periappendiceal fat stranding.  There is no focal fluid collection to suggest abscess.  There is no evidence of free intraperitoneal air to suggest perforation    Procedure(s) (LRB):  APPENDECTOMY, LAPAROSCOPIC CONVERTED TO OPEN (N/A)     Hospital Course:   Patient was admitted following the above procedure, which she tolerated well. Her diet was advanced as tolerated and her pain was controlled with oral medications. She was deemed appropriate for discharge home with 5 days of antibiotics.    Consults:   Consults (From admission, onward)        Status Ordering Provider     Inpatient consult to General surgery  Once     Provider:  (Not yet assigned)    KARIN Brown          Significant Diagnostic Studies:     Pending Diagnostic Studies:     Procedure Component Value Units Date/Time    Specimen to Pathology, Surgery General Surgery [[id number]] Collected: 08/28/20 1605    Order Status: Sent Lab Status: In process Updated: 08/28/20 1604        Final Active Diagnoses:    Diagnosis Date Noted POA     PRINCIPAL PROBLEM:  Acute appendicitis with localized peritonitis, without perforation, abscess, or gangrene [K35.30] 08/28/2020 Yes      Problems Resolved During this Admission:      Discharged Condition: good    Disposition: Home or Self Care    Follow Up:  Follow-up Information     David Eldridge MD In 2 weeks.    Specialty: General Surgery  Contact information:  [address]  [facility] [address]  [phone]                 Patient Instructions:      Diet Adult Regular     Lifting restrictions   Order Comments: Do not lift more than 15lbs for 6 weeks     Notify your health care provider if you experience any of the following:  temperature >100.4     Notify your health care provider if you experience any of the following:  persistent nausea and vomiting or diarrhea     Notify your health care provider if you experience any of the following:  severe uncontrolled pain     Notify your health care provider if you experience any of the following:  redness, tenderness, or signs of infection (pain, swelling, redness, odor or green/yellow discharge around incision site)     Remove dressing in 24 hours   Order Comments: Okay to shower in 24 hours, do not soak in water such as a bath tub until after your clinic visit     Medications:  Reconciled Home Medications:      Medication List      START taking these medications    ciprofloxacin HCl 500 MG tablet  Commonly known as: CIPRO  Take 1 tablet (500 mg total) by mouth every 12 (twelve) hours. for 5 days     HYDROcodone-acetaminophen 5-325 mg per tablet  Commonly known as: NORCO  Take 1 tablet by mouth every 4 (four) hours as needed.     metroNIDAZOLE 500 MG tablet  Commonly known as: FLAGYL  Take 1 tablet (500 mg total) by mouth every 8 (eight) hours. for 5 days        CONTINUE taking these medications    biotin 2,500 mcg Tab  Take 50,000 mcg by mouth once daily.     citalopram 20 MG tablet  Commonly known as: CELEXA  Take 20 mg by mouth once daily.      diazePAM 5 MG tablet  Commonly known as: VALIUM  Take 5 mg by mouth every 6 (six) hours as needed.     lamoTRIgine 200 MG tablet  Commonly known as: LAMICTAL  2 (two) times daily.     QUEtiapine 300 MG Tb24  Commonly known as: SEROQUEL XR     rosuvastatin 10 MG tablet  Commonly known as: CRESTOR  Take 1 tablet by mouth once daily     varicella-zoster gE-AS01B (PF) 50 mcg/0.5 mL injection  Commonly known as: SHINGRIX (PF)  Inject into the muscle.     VITAMIN D3 50 mcg (2,000 unit) Tab  Generic drug: cholecalciferol (vitamin D3)  Take by mouth once daily.            Sofi Plunkett MD  General Surgery  [facility]

## 2020-08-29 NOTE — ASSESSMENT & PLAN NOTE
60 y.o. female w/ acute appendicitis s/p lap converted to open appendectomy. Found to have grossly perforated appendix intra-op.     Advance diet to regular  Continue IV antibiotics  Ambulate, OOBTC  Encourage IS  Likely home tomorrow on PO antibiotics

## 2020-08-29 NOTE — PROGRESS NOTES
[facility]  General Surgery  Progress Note    Subjective:       Post-Op Info:  Procedure(s) (LRB):  APPENDECTOMY, LAPAROSCOPIC CONVERTED TO OPEN (N/A)   1 Day Post-Op     Interval History: NAEON. Tolerating liquids without nausea or vomiting. Pain controlled. Not yet passing gas, no bowel movements. Ambulating to bathroom.     Medications:  Continuous Infusions:  Scheduled Meds:   lamoTRIgine  200 mg Oral BID    lidocaine (PF) 10 mg/ml (1%)  1 mL Other Once    piperacillin-tazobactam (ZOSYN) IVPB  4.5 g Intravenous Q8H    QUEtiapine  300 mg Oral QHS     PRN Meds:acetaminophen, HYDROcodone-acetaminophen, HYDROcodone-acetaminophen, HYDROmorphone, melatonin, ondansetron, sodium chloride 0.9%     Review of patient's allergies indicates:   Allergen Reactions    Iodine      Other reaction(s): Vomiting    Sulfa (sulfonamide antibiotics)      Other reaction(s): Swelling     Objective:     Vital Signs (Most Recent):  Temp: 97.4 °F (36.3 °C) (08/29/20 0811)  Pulse: 78 (08/29/20 0811)  Resp: 16 (08/29/20 0811)  BP: 100/62 (08/29/20 0811)  SpO2: (!) 94 % (08/29/20 0811) Vital Signs (24h Range):  Temp:  [96.7 °F (35.9 °C)-99.1 °F (37.3 °C)] 97.4 °F (36.3 °C)  Pulse:  [75-96] 78  Resp:  [11-20] 16  SpO2:  [89 %-99 %] 94 %  BP: ()/(54-62) 100/62     Weight: 60.5 kg (133 lb 6.1 oz)  Body mass index is 23.63 kg/m².    Intake/Output - Last 3 Shifts       08/27 0700 - 08/28 0659 08/28 0700 - 08/29 0659 08/29 0700 - 08/30 0659    I.V. (mL/kg)  900 (14.9)     Total Intake(mL/kg)  900 (14.9)     Urine (mL/kg/hr)  470     Blood  20     Total Output  490     Net  +410                  Physical Exam  Vitals signs reviewed.   Constitutional:       Appearance: Normal appearance.   HENT:      Head: Normocephalic and atraumatic.   Eyes:      Extraocular Movements: Extraocular movements intact.      Conjunctiva/sclera: Conjunctivae normal.   Neck:      Musculoskeletal: Normal range of motion.   Cardiovascular:       Rate and Rhythm: Normal rate.      Pulses: Normal pulses.   Pulmonary:      Effort: Pulmonary effort is normal. No respiratory distress.   Abdominal:      General: Abdomen is flat.      Palpations: Abdomen is soft.      Tenderness: There is abdominal tenderness (appropriate incisional tenderness).      Comments: Incision with island dressing intact.   Musculoskeletal: Normal range of motion.   Skin:     General: Skin is warm and dry.   Neurological:      Mental Status: She is alert.         Significant Labs:  CBC:   Recent Labs   Lab 08/29/20  0501   WBC 15.08*   RBC 3.41*   HGB 11.0*   HCT 34.9*      *   MCH 32.3*   MCHC 31.5*     BMP:   Recent Labs   Lab 08/29/20  0501   *      K 4.3      CO2 25   BUN 15   CREATININE 0.8   CALCIUM 8.7   MG 1.9       Significant Diagnostics:  I have reviewed and interpreted all pertinent imaging results/findings within the past 24 hours.    Assessment/Plan:     * Acute appendicitis with localized peritonitis, without perforation, abscess, or gangrene  60 y.o. female w/ acute appendicitis s/p lap converted to open appendectomy. Found to have grossly perforated appendix intra-op.     Advance diet to regular  Continue IV antibiotics  Ambulate, OOBTC  Encourage IS  Likely home tomorrow on PO antibiotics        Katarina Hernandez MD  General Surgery  [facility]

## 2020-08-29 NOTE — SUBJECTIVE & OBJECTIVE
Interval History: NAEON. Tolerating liquids without nausea or vomiting. Pain controlled. Not yet passing gas, no bowel movements. Ambulating to bathroom.     Medications:  Continuous Infusions:  Scheduled Meds:   lamoTRIgine  200 mg Oral BID    lidocaine (PF) 10 mg/ml (1%)  1 mL Other Once    piperacillin-tazobactam (ZOSYN) IVPB  4.5 g Intravenous Q8H    QUEtiapine  300 mg Oral QHS     PRN Meds:acetaminophen, HYDROcodone-acetaminophen, HYDROcodone-acetaminophen, HYDROmorphone, melatonin, ondansetron, sodium chloride 0.9%     Review of patient's allergies indicates:   Allergen Reactions    Iodine      Other reaction(s): Vomiting    Sulfa (sulfonamide antibiotics)      Other reaction(s): Swelling     Objective:     Vital Signs (Most Recent):  Temp: 97.4 °F (36.3 °C) (08/29/20 0811)  Pulse: 78 (08/29/20 0811)  Resp: 16 (08/29/20 0811)  BP: 100/62 (08/29/20 0811)  SpO2: (!) 94 % (08/29/20 0811) Vital Signs (24h Range):  Temp:  [96.7 °F (35.9 °C)-99.1 °F (37.3 °C)] 97.4 °F (36.3 °C)  Pulse:  [75-96] 78  Resp:  [11-20] 16  SpO2:  [89 %-99 %] 94 %  BP: ()/(54-62) 100/62     Weight: 60.5 kg (133 lb 6.1 oz)  Body mass index is 23.63 kg/m².    Intake/Output - Last 3 Shifts       08/27 0700 - 08/28 0659 08/28 0700 - 08/29 0659 08/29 0700 - 08/30 0659    I.V. (mL/kg)  900 (14.9)     Total Intake(mL/kg)  900 (14.9)     Urine (mL/kg/hr)  470     Blood  20     Total Output  490     Net  +410                  Physical Exam  Vitals signs reviewed.   Constitutional:       Appearance: Normal appearance.   HENT:      Head: Normocephalic and atraumatic.   Eyes:      Extraocular Movements: Extraocular movements intact.      Conjunctiva/sclera: Conjunctivae normal.   Neck:      Musculoskeletal: Normal range of motion.   Cardiovascular:      Rate and Rhythm: Normal rate.      Pulses: Normal pulses.   Pulmonary:      Effort: Pulmonary effort is normal. No respiratory distress.   Abdominal:      General: Abdomen is flat.       Palpations: Abdomen is soft.      Tenderness: There is abdominal tenderness (appropriate incisional tenderness).      Comments: Incision with island dressing intact.   Musculoskeletal: Normal range of motion.   Skin:     General: Skin is warm and dry.   Neurological:      Mental Status: She is alert.         Significant Labs:  CBC:   Recent Labs   Lab 08/29/20  0501   WBC 15.08*   RBC 3.41*   HGB 11.0*   HCT 34.9*      *   MCH 32.3*   MCHC 31.5*     BMP:   Recent Labs   Lab 08/29/20  0501   *      K 4.3      CO2 25   BUN 15   CREATININE 0.8   CALCIUM 8.7   MG 1.9       Significant Diagnostics:  I have reviewed and interpreted all pertinent imaging results/findings within the past 24 hours.

## 2020-09-03 LAB — FINAL PATHOLOGIC DIAGNOSIS: NORMAL

## 2020-09-08 ENCOUNTER — TELEPHONE (OUTPATIENT)
Dept: SURGERY | Facility: CLINIC | Age: 60
End: 2020-09-08

## 2020-09-08 NOTE — TELEPHONE ENCOUNTER
----- Message from Keely Marlow sent at 9/8/2020 12:56 PM CDT -----  Regarding: Post Op  Contact: PT  PT called - had her appendix removed on 8/28 - called to schedule her post op for this Friday but nothing is available until 9/16 - pt said she was supposed to come in this Friday for staple removal    Callback: [phone]

## 2020-09-09 ENCOUNTER — OFFICE VISIT (OUTPATIENT)
Dept: SURGERY | Facility: CLINIC | Age: 60
End: 2020-09-09
Payer: COMMERCIAL

## 2020-09-09 VITALS
SYSTOLIC BLOOD PRESSURE: 106 MMHG | HEART RATE: 104 BPM | TEMPERATURE: 99 F | DIASTOLIC BLOOD PRESSURE: 65 MMHG | OXYGEN SATURATION: 96 %

## 2020-09-09 DIAGNOSIS — Z90.49 S/P APPENDECTOMY: Primary | ICD-10-CM

## 2020-09-09 PROCEDURE — 99999 PR PBB SHADOW E&M-EST. PATIENT-LVL III: CPT | Mod: PBBFAC,,, | Performed by: SURGERY

## 2020-09-09 PROCEDURE — 99024 PR POST-OP FOLLOW-UP VISIT: ICD-10-PCS | Mod: S$GLB,,, | Performed by: SURGERY

## 2020-09-09 PROCEDURE — 99999 PR PBB SHADOW E&M-EST. PATIENT-LVL III: ICD-10-PCS | Mod: PBBFAC,,, | Performed by: SURGERY

## 2020-09-09 PROCEDURE — 99024 POSTOP FOLLOW-UP VISIT: CPT | Mod: S$GLB,,, | Performed by: SURGERY

## 2020-09-09 NOTE — PROGRESS NOTES
GENERAL SURGERY CLINIC  HISTORY AND PHYSICAL    CC:  Post op visit    HPI:  Kimberly Jo is a 60 y.o.  female who presents to clinic for her post op appt s/p lap converted to open appendectomy for acute appendicitis.  She is doing well.  Tolerating diet.  Pain controlled.  Some constipation but this is a chronic problem for her.  No drainage from incision.  Denies fevers/chills      ROS:   A 10-point review of systems is negative except for the above mentioned in the HPI.     Past Medical History:   Diagnosis Date    Depression     Dr Lourdes Mack & therapist Jaziel    History of herpes zoster 06/21/2017    tx GYN    Mixed hyperlipidemia 2/2/2016    Moderate episode of recurrent major depressive disorder 8/16/2016    Slow transit constipation 2/2/2016    CS 52 yo    SVT (supraventricular tachycardia)     2015 ablated with Adenosine EMT (too much caffeine), cardiologist rec bblocker if it recurrs    Vitamin D deficiency     2015 OTC suppl    Word finding difficulty 07/10/2019    MRI nml 2019, refer to Neurol 7/2019       Past Surgical History:   Procedure Laterality Date    APPENDECTOMY  8/28/2020    Procedure: APPENDECTOMY;  Surgeon: David Eldridge MD;  Location: [facility] OR [address] [place] [address];  Service: General;;    LAPAROSCOPIC APPENDECTOMY N/A 8/28/2020    Procedure: APPENDECTOMY, LAPAROSCOPIC CONVERTED TO OPEN;  Surgeon: David Eldridge MD;  Location: [facility] OR [address] [place] [address];  Service: General;  Laterality: N/A;       Social History     Socioeconomic History    Marital status:      Spouse name: Not on file    Number of children: Not on file    Years of education: Not on file    Highest education level: Not on file   Occupational History    Not on file   Social Needs    Financial resource strain: Not on file    Food insecurity     Worry: Not on file     Inability: Not on file    Transportation needs     Medical: Not on file     Non-medical: Not on file   Tobacco Use    Smoking status:  Never Smoker    Smokeless tobacco: Never Used   Substance and Sexual Activity    Alcohol use: No     Alcohol/week: 2.5 standard drinks     Types: 3 Standard drinks or equivalent per week    Drug use: No    Sexual activity: Yes     Partners: Male     Birth control/protection: Post-menopausal     Comment:    Lifestyle    Physical activity     Days per week: Not on file     Minutes per session: Not on file    Stress: Not on file   Relationships    Social connections     Talks on phone: Not on file     Gets together: Not on file     Attends Islam service: Not on file     Active member of club or organization: Not on file     Attends meetings of clubs or organizations: Not on file     Relationship status: Not on file   Other Topics Concern    Not on file   Social History Narrative    Homemaker, enjoys yoga,  Jimbo with glaucoma, 2 children, nonsmoker, ETOH socially, GYN here & [place], neg HPV 2018, repeat 2021,  normal colonoscopy 52 yo per pt with doctor on [place]       Review of patient's allergies indicates:   Allergen Reactions    Iodine      Other reaction(s): Vomiting    Sulfa (sulfonamide antibiotics)      Other reaction(s): Swelling         Current Outpatient Medications:     biotin 2,500 mcg Tab, Take 50,000 mcg by mouth once daily., Disp: , Rfl:     cholecalciferol, vitamin D3, (VITAMIN D3) 2,000 unit Tab, Take by mouth once daily., Disp: , Rfl:     citalopram (CELEXA) 20 MG tablet, Take 20 mg by mouth once daily., Disp: , Rfl:     diazePAM (VALIUM) 5 MG tablet, Take 5 mg by mouth every 6 (six) hours as needed. , Disp: , Rfl:     HYDROcodone-acetaminophen (NORCO) 5-325 mg per tablet, Take 1 tablet by mouth every 4 (four) hours as needed., Disp: 28 tablet, Rfl: 0    lamotrigine (LAMICTAL) 200 MG tablet, 2 (two) times daily. , Disp: , Rfl:     QUEtiapine (SEROQUEL XR) 300 MG Tb24, , Disp: , Rfl:     rosuvastatin (CRESTOR) 10 MG tablet, Take 1 tablet by mouth once daily, Disp: 90  tablet, Rfl: 0    varicella-zoster gE-AS01B, PF, (SHINGRIX, PF,) 50 mcg/0.5 mL injection, Inject into the muscle. (Patient not taking: Reported on 2/10/2020), Disp: 1 each, Rfl: 0    PHYSICAL EXAM:  Vitals:    09/09/20 1124   BP: 106/65   Pulse: 104   Temp: 98.6 °F (37 °C)       General: NAD  Neuro: AAOx3  Cardio: RRR  Resp: Breathing even and unlabored  Abd: Soft, ND, NT, lower midline incision well healed with staples in place.  No signs of infection  Ext: Warm and well perfused      PERTINENT LABS:  Reviewed.       PERTINENT IMAGING:  Reviewed.       ASSESSMENT/PLAN:  Kimberly Jo is a 60 y.o. female who is s/p lap converted to open appendectomy for acute appendicitis, doing well    - Staples removed in clinic  - Continue lifting restrictions for 2 more weeks, ok to bathe  - RTC PRN      Hood Tena M.D.  [facility], PGY 5

## 2020-10-09 RX ORDER — ROSUVASTATIN CALCIUM 10 MG/1
TABLET, COATED ORAL
Qty: 90 TABLET | Refills: 0 | Status: SHIPPED | OUTPATIENT
Start: 2020-10-09 | End: 2020-12-28

## 2020-10-09 NOTE — TELEPHONE ENCOUNTER
Med refilled, please offer Physical with labs prior (mariana needs lipids for me to continue refills for  rosuvastatin)

## 2020-10-12 ENCOUNTER — OFFICE VISIT (OUTPATIENT)
Dept: PSYCHIATRY | Facility: CLINIC | Age: 60
End: 2020-10-12
Payer: COMMERCIAL

## 2020-10-12 DIAGNOSIS — Z65.8 INTERPERSONAL PROBLEM: ICD-10-CM

## 2020-10-12 DIAGNOSIS — F41.1 GAD (GENERALIZED ANXIETY DISORDER): ICD-10-CM

## 2020-10-12 DIAGNOSIS — F31.61 BIPOLAR DISORDER, CURRENT EPISODE MIXED, MILD: Primary | ICD-10-CM

## 2020-10-12 DIAGNOSIS — Z63.9 FAMILY PROBLEMS: ICD-10-CM

## 2020-10-12 PROCEDURE — 90834 PSYTX W PT 45 MINUTES: CPT | Mod: S$GLB,,, | Performed by: SOCIAL WORKER

## 2020-10-12 PROCEDURE — 90834 PR PSYCHOTHERAPY W/PATIENT, 45 MIN: ICD-10-PCS | Mod: S$GLB,,, | Performed by: SOCIAL WORKER

## 2020-10-12 SDOH — SOCIAL DETERMINANTS OF HEALTH (SDOH): PROBLEM RELATED TO PRIMARY SUPPORT GROUP, UNSPECIFIED: Z63.9

## 2020-11-10 ENCOUNTER — TELEPHONE (OUTPATIENT)
Dept: PRIMARY CARE CLINIC | Facility: CLINIC | Age: 60
End: 2020-11-10

## 2020-12-16 ENCOUNTER — OFFICE VISIT (OUTPATIENT)
Dept: PSYCHIATRY | Facility: CLINIC | Age: 60
End: 2020-12-16
Payer: COMMERCIAL

## 2020-12-16 DIAGNOSIS — F31.61 BIPOLAR DISORDER, CURRENT EPISODE MIXED, MILD: ICD-10-CM

## 2020-12-16 DIAGNOSIS — F41.1 GAD (GENERALIZED ANXIETY DISORDER): Primary | ICD-10-CM

## 2020-12-16 DIAGNOSIS — Z63.9 FAMILY PROBLEMS: ICD-10-CM

## 2020-12-16 PROBLEM — R47.89 WORD FINDING DIFFICULTY: Status: RESOLVED | Noted: 2019-07-10 | Resolved: 2020-12-16

## 2020-12-16 PROBLEM — K35.30 ACUTE APPENDICITIS WITH LOCALIZED PERITONITIS, WITHOUT PERFORATION, ABSCESS, OR GANGRENE: Status: RESOLVED | Noted: 2020-08-28 | Resolved: 2020-12-16

## 2020-12-16 PROCEDURE — 90834 PSYTX W PT 45 MINUTES: CPT | Mod: S$GLB,,, | Performed by: SOCIAL WORKER

## 2020-12-16 PROCEDURE — 90834 PR PSYCHOTHERAPY W/PATIENT, 45 MIN: ICD-10-PCS | Mod: S$GLB,,, | Performed by: SOCIAL WORKER

## 2020-12-16 SDOH — SOCIAL DETERMINANTS OF HEALTH (SDOH): PROBLEM RELATED TO PRIMARY SUPPORT GROUP, UNSPECIFIED: Z63.9
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
## 2020-12-28 RX ORDER — ROSUVASTATIN CALCIUM 10 MG/1
TABLET, COATED ORAL
Qty: 90 TABLET | Refills: 3 | Status: SHIPPED | OUTPATIENT
Start: 2020-12-28 | End: 2022-02-08

## 2020-12-28 NOTE — TELEPHONE ENCOUNTER
----- Message from Hiwot Epps sent at 12/28/2020 11:43 AM CST -----  Would like to get medical advice.  Symptoms (please be specific):    How long has patient had these symptoms:    Pharmacy name and phone # (copy from chart):    Comments:     Sandro is requesting to a sooner apptSalvador Jo as a court date on 1/13/2021 to get custody for her granddaughter and need to be seen and have documents filled out for court

## 2021-01-05 ENCOUNTER — HOSPITAL ENCOUNTER (OUTPATIENT)
Dept: RADIOLOGY | Facility: HOSPITAL | Age: 61
Discharge: HOME | End: 2021-01-05
Attending: FAMILY MEDICINE
Payer: COMMERCIAL

## 2021-01-05 ENCOUNTER — OFFICE VISIT (OUTPATIENT)
Dept: PRIMARY CARE CLINIC | Facility: CLINIC | Age: 61
End: 2021-01-05
Payer: COMMERCIAL

## 2021-01-05 VITALS
OXYGEN SATURATION: 96 % | WEIGHT: 146.81 LBS | HEIGHT: 63 IN | BODY MASS INDEX: 26.01 KG/M2 | DIASTOLIC BLOOD PRESSURE: 78 MMHG | SYSTOLIC BLOOD PRESSURE: 110 MMHG | HEART RATE: 89 BPM

## 2021-01-05 DIAGNOSIS — Z12.31 OTHER SCREENING MAMMOGRAM: ICD-10-CM

## 2021-01-05 DIAGNOSIS — F41.1 GAD (GENERALIZED ANXIETY DISORDER): ICD-10-CM

## 2021-01-05 DIAGNOSIS — K59.09 CHRONIC CONSTIPATION: ICD-10-CM

## 2021-01-05 DIAGNOSIS — Z00.00 ROUTINE GENERAL MEDICAL EXAMINATION AT A HEALTH CARE FACILITY: Primary | ICD-10-CM

## 2021-01-05 DIAGNOSIS — F31.61 BIPOLAR DISORDER, CURRENT EPISODE MIXED, MILD: ICD-10-CM

## 2021-01-05 PROCEDURE — 99396 PR PREVENTIVE VISIT,EST,40-64: ICD-10-PCS | Mod: S$GLB,,, | Performed by: FAMILY MEDICINE

## 2021-01-05 PROCEDURE — 99396 PREV VISIT EST AGE 40-64: CPT | Mod: S$GLB,,, | Performed by: FAMILY MEDICINE

## 2021-01-05 PROCEDURE — 3008F PR BODY MASS INDEX (BMI) DOCUMENTED: ICD-10-PCS | Mod: CPTII,S$GLB,, | Performed by: FAMILY MEDICINE

## 2021-01-05 PROCEDURE — 77067 SCR MAMMO BI INCL CAD: CPT | Mod: 26,,, | Performed by: RADIOLOGY

## 2021-01-05 PROCEDURE — 77063 MAMMO DIGITAL SCREENING BILAT WITH TOMO: ICD-10-PCS | Mod: 26,,, | Performed by: RADIOLOGY

## 2021-01-05 PROCEDURE — 77063 BREAST TOMOSYNTHESIS BI: CPT | Mod: 26,,, | Performed by: RADIOLOGY

## 2021-01-05 PROCEDURE — 1126F PR PAIN SEVERITY QUANTIFIED, NO PAIN PRESENT: ICD-10-PCS | Mod: S$GLB,,, | Performed by: FAMILY MEDICINE

## 2021-01-05 PROCEDURE — 3008F BODY MASS INDEX DOCD: CPT | Mod: CPTII,S$GLB,, | Performed by: FAMILY MEDICINE

## 2021-01-05 PROCEDURE — 77067 SCR MAMMO BI INCL CAD: CPT | Mod: TC,PN

## 2021-01-05 PROCEDURE — 77067 MAMMO DIGITAL SCREENING BILAT WITH TOMO: ICD-10-PCS | Mod: 26,,, | Performed by: RADIOLOGY

## 2021-01-05 PROCEDURE — 1126F AMNT PAIN NOTED NONE PRSNT: CPT | Mod: S$GLB,,, | Performed by: FAMILY MEDICINE

## 2021-01-05 PROCEDURE — 99999 PR PBB SHADOW E&M-EST. PATIENT-LVL III: ICD-10-PCS | Mod: PBBFAC,,, | Performed by: FAMILY MEDICINE

## 2021-01-05 PROCEDURE — 99999 PR PBB SHADOW E&M-EST. PATIENT-LVL III: CPT | Mod: PBBFAC,,, | Performed by: FAMILY MEDICINE

## 2021-03-09 ENCOUNTER — OFFICE VISIT (OUTPATIENT)
Dept: PSYCHIATRY | Facility: CLINIC | Age: 61
End: 2021-03-09
Payer: COMMERCIAL

## 2021-03-09 DIAGNOSIS — F31.61 BIPOLAR DISORDER, CURRENT EPISODE MIXED, MILD: Primary | ICD-10-CM

## 2021-03-09 DIAGNOSIS — Z63.9 FAMILY PROBLEMS: ICD-10-CM

## 2021-03-09 DIAGNOSIS — F41.1 GAD (GENERALIZED ANXIETY DISORDER): ICD-10-CM

## 2021-03-09 PROCEDURE — 90834 PSYTX W PT 45 MINUTES: CPT | Mod: 95,,, | Performed by: SOCIAL WORKER

## 2021-03-09 PROCEDURE — 90834 PR PSYCHOTHERAPY W/PATIENT, 45 MIN: ICD-10-PCS | Mod: 95,,, | Performed by: SOCIAL WORKER

## 2021-03-09 SDOH — SOCIAL DETERMINANTS OF HEALTH (SDOH): PROBLEM RELATED TO PRIMARY SUPPORT GROUP, UNSPECIFIED: Z63.9

## 2021-04-08 ENCOUNTER — OFFICE VISIT (OUTPATIENT)
Dept: PSYCHIATRY | Facility: CLINIC | Age: 61
End: 2021-04-08
Payer: COMMERCIAL

## 2021-04-08 DIAGNOSIS — F41.1 GAD (GENERALIZED ANXIETY DISORDER): Primary | ICD-10-CM

## 2021-04-08 DIAGNOSIS — F31.61 BIPOLAR DISORDER, CURRENT EPISODE MIXED, MILD: ICD-10-CM

## 2021-04-08 PROCEDURE — 90834 PSYTX W PT 45 MINUTES: CPT | Mod: S$GLB,,, | Performed by: SOCIAL WORKER

## 2021-04-08 PROCEDURE — 90834 PR PSYCHOTHERAPY W/PATIENT, 45 MIN: ICD-10-PCS | Mod: S$GLB,,, | Performed by: SOCIAL WORKER

## 2021-04-23 ENCOUNTER — OFFICE VISIT (OUTPATIENT)
Dept: PSYCHIATRY | Facility: CLINIC | Age: 61
End: 2021-04-23
Payer: COMMERCIAL

## 2021-04-23 DIAGNOSIS — F31.61 BIPOLAR DISORDER, CURRENT EPISODE MIXED, MILD: ICD-10-CM

## 2021-04-23 DIAGNOSIS — Z63.9 FAMILY PROBLEMS: ICD-10-CM

## 2021-04-23 DIAGNOSIS — F41.1 GAD (GENERALIZED ANXIETY DISORDER): Primary | ICD-10-CM

## 2021-04-23 PROCEDURE — 90834 PSYTX W PT 45 MINUTES: CPT | Mod: S$GLB,,, | Performed by: SOCIAL WORKER

## 2021-04-23 PROCEDURE — 90834 PR PSYCHOTHERAPY W/PATIENT, 45 MIN: ICD-10-PCS | Mod: S$GLB,,, | Performed by: SOCIAL WORKER

## 2021-04-23 SDOH — SOCIAL DETERMINANTS OF HEALTH (SDOH): PROBLEM RELATED TO PRIMARY SUPPORT GROUP, UNSPECIFIED: Z63.9

## 2021-05-14 ENCOUNTER — OFFICE VISIT (OUTPATIENT)
Dept: PSYCHIATRY | Facility: CLINIC | Age: 61
End: 2021-05-14
Payer: COMMERCIAL

## 2021-05-14 DIAGNOSIS — Z63.9 FAMILY PROBLEMS: ICD-10-CM

## 2021-05-14 DIAGNOSIS — F43.0 ACUTE STRESS REACTION: ICD-10-CM

## 2021-05-14 DIAGNOSIS — F41.1 GAD (GENERALIZED ANXIETY DISORDER): ICD-10-CM

## 2021-05-14 DIAGNOSIS — F31.61 BIPOLAR DISORDER, CURRENT EPISODE MIXED, MILD: Primary | ICD-10-CM

## 2021-05-14 PROCEDURE — 90834 PR PSYCHOTHERAPY W/PATIENT, 45 MIN: ICD-10-PCS | Mod: S$GLB,,, | Performed by: SOCIAL WORKER

## 2021-05-14 PROCEDURE — 90834 PSYTX W PT 45 MINUTES: CPT | Mod: S$GLB,,, | Performed by: SOCIAL WORKER

## 2021-05-14 SDOH — SOCIAL DETERMINANTS OF HEALTH (SDOH): PROBLEM RELATED TO PRIMARY SUPPORT GROUP, UNSPECIFIED: Z63.9

## 2021-06-10 ENCOUNTER — OFFICE VISIT (OUTPATIENT)
Dept: PSYCHIATRY | Facility: CLINIC | Age: 61
End: 2021-06-10
Payer: COMMERCIAL

## 2021-06-10 DIAGNOSIS — F41.1 GAD (GENERALIZED ANXIETY DISORDER): ICD-10-CM

## 2021-06-10 DIAGNOSIS — F31.61 BIPOLAR DISORDER, CURRENT EPISODE MIXED, MILD: Primary | ICD-10-CM

## 2021-06-10 DIAGNOSIS — Z63.9 FAMILY PROBLEMS: ICD-10-CM

## 2021-06-10 PROCEDURE — 90834 PSYTX W PT 45 MINUTES: CPT | Mod: S$GLB,,, | Performed by: SOCIAL WORKER

## 2021-06-10 PROCEDURE — 90834 PR PSYCHOTHERAPY W/PATIENT, 45 MIN: ICD-10-PCS | Mod: S$GLB,,, | Performed by: SOCIAL WORKER

## 2021-06-10 SDOH — SOCIAL DETERMINANTS OF HEALTH (SDOH): PROBLEM RELATED TO PRIMARY SUPPORT GROUP, UNSPECIFIED: Z63.9

## 2021-06-25 ENCOUNTER — OFFICE VISIT (OUTPATIENT)
Dept: PSYCHIATRY | Facility: CLINIC | Age: 61
End: 2021-06-25
Payer: COMMERCIAL

## 2021-06-25 DIAGNOSIS — F41.1 GAD (GENERALIZED ANXIETY DISORDER): ICD-10-CM

## 2021-06-25 DIAGNOSIS — F31.61 BIPOLAR DISORDER, CURRENT EPISODE MIXED, MILD: Primary | ICD-10-CM

## 2021-06-25 DIAGNOSIS — Z63.9 FAMILY PROBLEMS: ICD-10-CM

## 2021-06-25 PROCEDURE — 90834 PSYTX W PT 45 MINUTES: CPT | Mod: S$GLB,,, | Performed by: SOCIAL WORKER

## 2021-06-25 PROCEDURE — 90834 PR PSYCHOTHERAPY W/PATIENT, 45 MIN: ICD-10-PCS | Mod: S$GLB,,, | Performed by: SOCIAL WORKER

## 2021-06-25 SDOH — SOCIAL DETERMINANTS OF HEALTH (SDOH): PROBLEM RELATED TO PRIMARY SUPPORT GROUP, UNSPECIFIED: Z63.9

## 2021-07-23 ENCOUNTER — OFFICE VISIT (OUTPATIENT)
Dept: PSYCHIATRY | Facility: CLINIC | Age: 61
End: 2021-07-23
Payer: COMMERCIAL

## 2021-07-23 DIAGNOSIS — F41.1 GAD (GENERALIZED ANXIETY DISORDER): ICD-10-CM

## 2021-07-23 DIAGNOSIS — F31.61 BIPOLAR DISORDER, CURRENT EPISODE MIXED, MILD: Primary | ICD-10-CM

## 2021-07-23 DIAGNOSIS — Z63.9 FAMILY PROBLEMS: ICD-10-CM

## 2021-07-23 PROCEDURE — 90834 PR PSYCHOTHERAPY W/PATIENT, 45 MIN: ICD-10-PCS | Mod: S$GLB,,, | Performed by: SOCIAL WORKER

## 2021-07-23 PROCEDURE — 1159F MED LIST DOCD IN RCRD: CPT | Mod: CPTII,S$GLB,, | Performed by: SOCIAL WORKER

## 2021-07-23 PROCEDURE — 1159F PR MEDICATION LIST DOCUMENTED IN MEDICAL RECORD: ICD-10-PCS | Mod: CPTII,S$GLB,, | Performed by: SOCIAL WORKER

## 2021-07-23 PROCEDURE — 90834 PSYTX W PT 45 MINUTES: CPT | Mod: S$GLB,,, | Performed by: SOCIAL WORKER

## 2021-07-23 SDOH — SOCIAL DETERMINANTS OF HEALTH (SDOH): PROBLEM RELATED TO PRIMARY SUPPORT GROUP, UNSPECIFIED: Z63.9

## 2021-08-05 ENCOUNTER — OFFICE VISIT (OUTPATIENT)
Dept: PSYCHIATRY | Facility: CLINIC | Age: 61
End: 2021-08-05
Payer: COMMERCIAL

## 2021-08-05 DIAGNOSIS — F41.1 GAD (GENERALIZED ANXIETY DISORDER): ICD-10-CM

## 2021-08-05 DIAGNOSIS — Z63.9 FAMILY PROBLEMS: ICD-10-CM

## 2021-08-05 DIAGNOSIS — F31.61 BIPOLAR DISORDER, CURRENT EPISODE MIXED, MILD: Primary | ICD-10-CM

## 2021-08-05 PROCEDURE — 1159F MED LIST DOCD IN RCRD: CPT | Mod: CPTII,S$GLB,, | Performed by: SOCIAL WORKER

## 2021-08-05 PROCEDURE — 1159F PR MEDICATION LIST DOCUMENTED IN MEDICAL RECORD: ICD-10-PCS | Mod: CPTII,S$GLB,, | Performed by: SOCIAL WORKER

## 2021-08-05 PROCEDURE — 90834 PSYTX W PT 45 MINUTES: CPT | Mod: S$GLB,,, | Performed by: SOCIAL WORKER

## 2021-08-05 PROCEDURE — 90834 PR PSYCHOTHERAPY W/PATIENT, 45 MIN: ICD-10-PCS | Mod: S$GLB,,, | Performed by: SOCIAL WORKER

## 2021-08-05 SDOH — SOCIAL DETERMINANTS OF HEALTH (SDOH): PROBLEM RELATED TO PRIMARY SUPPORT GROUP, UNSPECIFIED: Z63.9

## 2021-08-19 ENCOUNTER — OFFICE VISIT (OUTPATIENT)
Dept: PSYCHIATRY | Facility: CLINIC | Age: 61
End: 2021-08-19
Payer: COMMERCIAL

## 2021-08-19 DIAGNOSIS — F41.1 GAD (GENERALIZED ANXIETY DISORDER): ICD-10-CM

## 2021-08-19 DIAGNOSIS — Z63.4 DEATH OF FAMILY MEMBER: ICD-10-CM

## 2021-08-19 DIAGNOSIS — Z63.9 FAMILY PROBLEMS: ICD-10-CM

## 2021-08-19 DIAGNOSIS — F31.61 BIPOLAR DISORDER, CURRENT EPISODE MIXED, MILD: Primary | ICD-10-CM

## 2021-08-19 PROCEDURE — 90834 PR PSYCHOTHERAPY W/PATIENT, 45 MIN: ICD-10-PCS | Mod: S$GLB,,, | Performed by: SOCIAL WORKER

## 2021-08-19 PROCEDURE — 1159F MED LIST DOCD IN RCRD: CPT | Mod: CPTII,S$GLB,, | Performed by: SOCIAL WORKER

## 2021-08-19 PROCEDURE — 90834 PSYTX W PT 45 MINUTES: CPT | Mod: S$GLB,,, | Performed by: SOCIAL WORKER

## 2021-08-19 PROCEDURE — 1159F PR MEDICATION LIST DOCUMENTED IN MEDICAL RECORD: ICD-10-PCS | Mod: CPTII,S$GLB,, | Performed by: SOCIAL WORKER

## 2021-08-19 SDOH — SOCIAL DETERMINANTS OF HEALTH (SDOH): PROBLEM RELATED TO PRIMARY SUPPORT GROUP, UNSPECIFIED: Z63.9

## 2021-08-19 SDOH — SOCIAL DETERMINANTS OF HEALTH (SDOH): DISSAPEARANCE AND DEATH OF FAMILY MEMBER: Z63.4

## 2021-09-09 ENCOUNTER — OFFICE VISIT (OUTPATIENT)
Dept: PSYCHIATRY | Facility: CLINIC | Age: 61
End: 2021-09-09
Payer: COMMERCIAL

## 2021-09-09 DIAGNOSIS — Z63.4 DEATH OF FAMILY MEMBER: ICD-10-CM

## 2021-09-09 DIAGNOSIS — F41.1 GAD (GENERALIZED ANXIETY DISORDER): ICD-10-CM

## 2021-09-09 DIAGNOSIS — F31.61 BIPOLAR DISORDER, CURRENT EPISODE MIXED, MILD: Primary | ICD-10-CM

## 2021-09-09 DIAGNOSIS — F43.0 ACUTE STRESS REACTION: ICD-10-CM

## 2021-09-09 PROCEDURE — 90834 PR PSYCHOTHERAPY W/PATIENT, 45 MIN: ICD-10-PCS | Mod: S$GLB,,, | Performed by: SOCIAL WORKER

## 2021-09-09 PROCEDURE — 90834 PSYTX W PT 45 MINUTES: CPT | Mod: S$GLB,,, | Performed by: SOCIAL WORKER

## 2021-09-09 SDOH — SOCIAL DETERMINANTS OF HEALTH (SDOH): DISSAPEARANCE AND DEATH OF FAMILY MEMBER: Z63.4

## 2021-09-23 ENCOUNTER — OFFICE VISIT (OUTPATIENT)
Dept: PSYCHIATRY | Facility: CLINIC | Age: 61
End: 2021-09-23
Payer: COMMERCIAL

## 2021-09-23 DIAGNOSIS — F41.1 GAD (GENERALIZED ANXIETY DISORDER): ICD-10-CM

## 2021-09-23 DIAGNOSIS — Z63.9 FAMILY PROBLEMS: ICD-10-CM

## 2021-09-23 DIAGNOSIS — F31.61 BIPOLAR DISORDER, CURRENT EPISODE MIXED, MILD: Primary | ICD-10-CM

## 2021-09-23 DIAGNOSIS — F43.0 ACUTE STRESS REACTION: ICD-10-CM

## 2021-09-23 PROCEDURE — 90834 PR PSYCHOTHERAPY W/PATIENT, 45 MIN: ICD-10-PCS | Mod: S$GLB,,, | Performed by: SOCIAL WORKER

## 2021-09-23 PROCEDURE — 1159F PR MEDICATION LIST DOCUMENTED IN MEDICAL RECORD: ICD-10-PCS | Mod: CPTII,S$GLB,, | Performed by: SOCIAL WORKER

## 2021-09-23 PROCEDURE — 90834 PSYTX W PT 45 MINUTES: CPT | Mod: S$GLB,,, | Performed by: SOCIAL WORKER

## 2021-09-23 PROCEDURE — 1159F MED LIST DOCD IN RCRD: CPT | Mod: CPTII,S$GLB,, | Performed by: SOCIAL WORKER

## 2021-09-23 SDOH — SOCIAL DETERMINANTS OF HEALTH (SDOH): PROBLEM RELATED TO PRIMARY SUPPORT GROUP, UNSPECIFIED: Z63.9

## 2021-10-05 ENCOUNTER — PATIENT MESSAGE (OUTPATIENT)
Dept: ADMINISTRATIVE | Facility: HOSPITAL | Age: 61
End: 2021-10-05

## 2021-10-13 ENCOUNTER — TELEPHONE (OUTPATIENT)
Dept: PRIMARY CARE CLINIC | Facility: CLINIC | Age: 61
End: 2021-10-13

## 2021-10-13 DIAGNOSIS — Z00.00 ANNUAL PHYSICAL EXAM: Primary | ICD-10-CM

## 2021-10-14 ENCOUNTER — OFFICE VISIT (OUTPATIENT)
Dept: PSYCHIATRY | Facility: CLINIC | Age: 61
End: 2021-10-14
Payer: COMMERCIAL

## 2021-10-14 DIAGNOSIS — Z63.9 FAMILY PROBLEMS: ICD-10-CM

## 2021-10-14 DIAGNOSIS — F31.61 BIPOLAR DISORDER, CURRENT EPISODE MIXED, MILD: ICD-10-CM

## 2021-10-14 DIAGNOSIS — F41.1 GAD (GENERALIZED ANXIETY DISORDER): Primary | ICD-10-CM

## 2021-10-14 PROCEDURE — 90834 PSYTX W PT 45 MINUTES: CPT | Mod: S$GLB,,, | Performed by: SOCIAL WORKER

## 2021-10-14 PROCEDURE — 1159F MED LIST DOCD IN RCRD: CPT | Mod: CPTII,S$GLB,, | Performed by: SOCIAL WORKER

## 2021-10-14 PROCEDURE — 1159F PR MEDICATION LIST DOCUMENTED IN MEDICAL RECORD: ICD-10-PCS | Mod: CPTII,S$GLB,, | Performed by: SOCIAL WORKER

## 2021-10-14 PROCEDURE — 90834 PR PSYCHOTHERAPY W/PATIENT, 45 MIN: ICD-10-PCS | Mod: S$GLB,,, | Performed by: SOCIAL WORKER

## 2021-10-14 SDOH — SOCIAL DETERMINANTS OF HEALTH (SDOH): PROBLEM RELATED TO PRIMARY SUPPORT GROUP, UNSPECIFIED: Z63.9

## 2021-10-27 ENCOUNTER — OFFICE VISIT (OUTPATIENT)
Dept: PSYCHIATRY | Facility: CLINIC | Age: 61
End: 2021-10-27
Payer: COMMERCIAL

## 2021-10-27 DIAGNOSIS — Z63.9 FAMILY PROBLEMS: ICD-10-CM

## 2021-10-27 DIAGNOSIS — F41.1 GAD (GENERALIZED ANXIETY DISORDER): ICD-10-CM

## 2021-10-27 DIAGNOSIS — F43.21 GRIEF: ICD-10-CM

## 2021-10-27 DIAGNOSIS — F31.61 BIPOLAR DISORDER, CURRENT EPISODE MIXED, MILD: Primary | ICD-10-CM

## 2021-10-27 PROCEDURE — 1159F PR MEDICATION LIST DOCUMENTED IN MEDICAL RECORD: ICD-10-PCS | Mod: CPTII,S$GLB,, | Performed by: SOCIAL WORKER

## 2021-10-27 PROCEDURE — 1159F MED LIST DOCD IN RCRD: CPT | Mod: CPTII,S$GLB,, | Performed by: SOCIAL WORKER

## 2021-10-27 PROCEDURE — 90834 PSYTX W PT 45 MINUTES: CPT | Mod: S$GLB,,, | Performed by: SOCIAL WORKER

## 2021-10-27 PROCEDURE — 90834 PR PSYCHOTHERAPY W/PATIENT, 45 MIN: ICD-10-PCS | Mod: S$GLB,,, | Performed by: SOCIAL WORKER

## 2021-10-27 SDOH — SOCIAL DETERMINANTS OF HEALTH (SDOH): PROBLEM RELATED TO PRIMARY SUPPORT GROUP, UNSPECIFIED: Z63.9

## 2021-10-28 PROBLEM — F43.21 GRIEF: Status: ACTIVE | Noted: 2021-10-28

## 2021-11-11 ENCOUNTER — OFFICE VISIT (OUTPATIENT)
Dept: PSYCHIATRY | Facility: CLINIC | Age: 61
End: 2021-11-11
Payer: COMMERCIAL

## 2021-11-11 DIAGNOSIS — F31.61 BIPOLAR DISORDER, CURRENT EPISODE MIXED, MILD: Primary | ICD-10-CM

## 2021-11-11 DIAGNOSIS — F43.21 GRIEF: ICD-10-CM

## 2021-11-11 DIAGNOSIS — F41.1 GAD (GENERALIZED ANXIETY DISORDER): ICD-10-CM

## 2021-11-11 DIAGNOSIS — F43.0 ACUTE STRESS REACTION: ICD-10-CM

## 2021-11-11 DIAGNOSIS — Z63.9 FAMILY PROBLEMS: ICD-10-CM

## 2021-11-11 PROCEDURE — 90834 PSYTX W PT 45 MINUTES: CPT | Mod: S$GLB,,, | Performed by: SOCIAL WORKER

## 2021-11-11 PROCEDURE — 90834 PR PSYCHOTHERAPY W/PATIENT, 45 MIN: ICD-10-PCS | Mod: S$GLB,,, | Performed by: SOCIAL WORKER

## 2021-11-11 SDOH — SOCIAL DETERMINANTS OF HEALTH (SDOH): PROBLEM RELATED TO PRIMARY SUPPORT GROUP, UNSPECIFIED: Z63.9

## 2021-11-22 ENCOUNTER — OFFICE VISIT (OUTPATIENT)
Dept: PSYCHIATRY | Facility: CLINIC | Age: 61
End: 2021-11-22
Payer: COMMERCIAL

## 2021-11-22 DIAGNOSIS — F31.61 BIPOLAR DISORDER, CURRENT EPISODE MIXED, MILD: Primary | ICD-10-CM

## 2021-11-22 DIAGNOSIS — F41.1 GAD (GENERALIZED ANXIETY DISORDER): ICD-10-CM

## 2021-11-22 DIAGNOSIS — Z63.9 FAMILY PROBLEMS: ICD-10-CM

## 2021-11-22 DIAGNOSIS — F43.0 ACUTE STRESS REACTION: ICD-10-CM

## 2021-11-22 DIAGNOSIS — F43.21 GRIEF: ICD-10-CM

## 2021-11-22 PROCEDURE — 90834 PR PSYCHOTHERAPY W/PATIENT, 45 MIN: ICD-10-PCS | Mod: S$GLB,,, | Performed by: SOCIAL WORKER

## 2021-11-22 PROCEDURE — 90834 PSYTX W PT 45 MINUTES: CPT | Mod: S$GLB,,, | Performed by: SOCIAL WORKER

## 2021-11-22 SDOH — SOCIAL DETERMINANTS OF HEALTH (SDOH): PROBLEM RELATED TO PRIMARY SUPPORT GROUP, UNSPECIFIED: Z63.9

## 2021-12-06 ENCOUNTER — OFFICE VISIT (OUTPATIENT)
Dept: PSYCHIATRY | Facility: CLINIC | Age: 61
End: 2021-12-06
Payer: COMMERCIAL

## 2021-12-06 DIAGNOSIS — F43.0 ACUTE STRESS REACTION: ICD-10-CM

## 2021-12-06 DIAGNOSIS — F41.1 GAD (GENERALIZED ANXIETY DISORDER): ICD-10-CM

## 2021-12-06 DIAGNOSIS — Z63.9 FAMILY PROBLEMS: ICD-10-CM

## 2021-12-06 DIAGNOSIS — F31.61 BIPOLAR DISORDER, CURRENT EPISODE MIXED, MILD: Primary | ICD-10-CM

## 2021-12-06 PROCEDURE — 90834 PR PSYCHOTHERAPY W/PATIENT, 45 MIN: ICD-10-PCS | Mod: S$GLB,,, | Performed by: SOCIAL WORKER

## 2021-12-06 PROCEDURE — 90834 PSYTX W PT 45 MINUTES: CPT | Mod: S$GLB,,, | Performed by: SOCIAL WORKER

## 2021-12-06 SDOH — SOCIAL DETERMINANTS OF HEALTH (SDOH): PROBLEM RELATED TO PRIMARY SUPPORT GROUP, UNSPECIFIED: Z63.9

## 2021-12-15 ENCOUNTER — TELEPHONE (OUTPATIENT)
Dept: PSYCHIATRY | Facility: CLINIC | Age: 61
End: 2021-12-15
Payer: COMMERCIAL

## 2021-12-28 ENCOUNTER — OFFICE VISIT (OUTPATIENT)
Dept: PSYCHIATRY | Facility: CLINIC | Age: 61
End: 2021-12-28
Payer: COMMERCIAL

## 2021-12-28 DIAGNOSIS — F41.1 GAD (GENERALIZED ANXIETY DISORDER): ICD-10-CM

## 2021-12-28 DIAGNOSIS — Z63.9 FAMILY PROBLEMS: ICD-10-CM

## 2021-12-28 DIAGNOSIS — F31.61 BIPOLAR DISORDER, CURRENT EPISODE MIXED, MILD: Primary | ICD-10-CM

## 2021-12-28 DIAGNOSIS — F43.0 ACUTE STRESS REACTION: ICD-10-CM

## 2021-12-28 PROCEDURE — 90834 PR PSYCHOTHERAPY W/PATIENT, 45 MIN: ICD-10-PCS | Mod: S$GLB,,, | Performed by: SOCIAL WORKER

## 2021-12-28 PROCEDURE — 1159F PR MEDICATION LIST DOCUMENTED IN MEDICAL RECORD: ICD-10-PCS | Mod: CPTII,S$GLB,, | Performed by: SOCIAL WORKER

## 2021-12-28 PROCEDURE — 90834 PSYTX W PT 45 MINUTES: CPT | Mod: S$GLB,,, | Performed by: SOCIAL WORKER

## 2021-12-28 PROCEDURE — 1159F MED LIST DOCD IN RCRD: CPT | Mod: CPTII,S$GLB,, | Performed by: SOCIAL WORKER

## 2021-12-28 SDOH — SOCIAL DETERMINANTS OF HEALTH (SDOH): PROBLEM RELATED TO PRIMARY SUPPORT GROUP, UNSPECIFIED: Z63.9

## 2022-01-10 ENCOUNTER — OFFICE VISIT (OUTPATIENT)
Dept: PSYCHIATRY | Facility: CLINIC | Age: 62
End: 2022-01-10
Payer: COMMERCIAL

## 2022-01-10 DIAGNOSIS — F31.61 BIPOLAR DISORDER, CURRENT EPISODE MIXED, MILD: Primary | ICD-10-CM

## 2022-01-10 DIAGNOSIS — Z63.9 FAMILY PROBLEMS: ICD-10-CM

## 2022-01-10 DIAGNOSIS — F41.1 GAD (GENERALIZED ANXIETY DISORDER): ICD-10-CM

## 2022-01-10 PROCEDURE — 1159F MED LIST DOCD IN RCRD: CPT | Mod: CPTII,S$GLB,, | Performed by: SOCIAL WORKER

## 2022-01-10 PROCEDURE — 90834 PSYTX W PT 45 MINUTES: CPT | Mod: S$GLB,,, | Performed by: SOCIAL WORKER

## 2022-01-10 PROCEDURE — 90834 PR PSYCHOTHERAPY W/PATIENT, 45 MIN: ICD-10-PCS | Mod: S$GLB,,, | Performed by: SOCIAL WORKER

## 2022-01-10 PROCEDURE — 1159F PR MEDICATION LIST DOCUMENTED IN MEDICAL RECORD: ICD-10-PCS | Mod: CPTII,S$GLB,, | Performed by: SOCIAL WORKER

## 2022-01-10 SDOH — SOCIAL DETERMINANTS OF HEALTH (SDOH): PROBLEM RELATED TO PRIMARY SUPPORT GROUP, UNSPECIFIED: Z63.9

## 2022-01-24 PROBLEM — F43.21 GRIEF: Status: RESOLVED | Noted: 2021-10-28 | Resolved: 2022-01-24

## 2022-01-24 PROBLEM — Z63.4 DEATH OF FAMILY MEMBER: Status: RESOLVED | Noted: 2021-09-09 | Resolved: 2022-01-24
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
## 2022-02-02 ENCOUNTER — OFFICE VISIT (OUTPATIENT)
Dept: PSYCHIATRY | Facility: CLINIC | Age: 62
End: 2022-02-02
Payer: COMMERCIAL

## 2022-02-02 DIAGNOSIS — F43.0 ACUTE STRESS REACTION: ICD-10-CM

## 2022-02-02 DIAGNOSIS — F31.61 BIPOLAR DISORDER, CURRENT EPISODE MIXED, MILD: Primary | ICD-10-CM

## 2022-02-02 DIAGNOSIS — Z63.9 FAMILY PROBLEMS: ICD-10-CM

## 2022-02-02 DIAGNOSIS — F41.1 GAD (GENERALIZED ANXIETY DISORDER): ICD-10-CM

## 2022-02-02 PROCEDURE — 90834 PSYTX W PT 45 MINUTES: CPT | Mod: S$GLB,,, | Performed by: SOCIAL WORKER

## 2022-02-02 PROCEDURE — 99999 PR PBB SHADOW E&M-EST. PATIENT-LVL I: ICD-10-PCS | Mod: PBBFAC,,, | Performed by: SOCIAL WORKER

## 2022-02-02 PROCEDURE — 1159F PR MEDICATION LIST DOCUMENTED IN MEDICAL RECORD: ICD-10-PCS | Mod: CPTII,S$GLB,, | Performed by: SOCIAL WORKER

## 2022-02-02 PROCEDURE — 1159F MED LIST DOCD IN RCRD: CPT | Mod: CPTII,S$GLB,, | Performed by: SOCIAL WORKER

## 2022-02-02 PROCEDURE — 90834 PR PSYCHOTHERAPY W/PATIENT, 45 MIN: ICD-10-PCS | Mod: S$GLB,,, | Performed by: SOCIAL WORKER

## 2022-02-02 PROCEDURE — 99999 PR PBB SHADOW E&M-EST. PATIENT-LVL I: CPT | Mod: PBBFAC,,, | Performed by: SOCIAL WORKER

## 2022-02-02 SDOH — SOCIAL DETERMINANTS OF HEALTH (SDOH): PROBLEM RELATED TO PRIMARY SUPPORT GROUP, UNSPECIFIED: Z63.9
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
## 2022-02-08 RX ORDER — ROSUVASTATIN CALCIUM 10 MG/1
TABLET, COATED ORAL
Qty: 90 TABLET | Refills: 0 | Status: SHIPPED | OUTPATIENT
Start: 2022-02-08 | End: 2022-05-25

## 2022-02-08 NOTE — TELEPHONE ENCOUNTER
Care Due:                  Date            Visit Type   Department     Provider  --------------------------------------------------------------------------------                                EP -                              PRIMARY      [facility] PRIMARY  Last Visit: 01-      CARE ([facility])   ARTURO Dalal                              EP -                              PRIMARY      [facility] PRIMARY  Next Visit: 03-      CARE ([facility])   [facility]           Ronna Dalal                                                            Last  Test          Frequency    Reason                     Performed    Due Date  --------------------------------------------------------------------------------    CMP.........  12 months..  rosuvastatin.............  Not Found    Overdue    Lipid Panel.  12 months..  rosuvastatin.............  Not Found    Overdue    Powered by [employer]. Reference number: [id number].   2/08/2022 12:51:00 PM CST

## 2022-02-14 ENCOUNTER — OFFICE VISIT (OUTPATIENT)
Dept: PSYCHIATRY | Facility: CLINIC | Age: 62
End: 2022-02-14
Payer: COMMERCIAL

## 2022-02-14 DIAGNOSIS — F31.61 BIPOLAR DISORDER, CURRENT EPISODE MIXED, MILD: Primary | ICD-10-CM

## 2022-02-14 DIAGNOSIS — Z63.9 FAMILY PROBLEMS: ICD-10-CM

## 2022-02-14 DIAGNOSIS — F43.0 ACUTE STRESS REACTION: ICD-10-CM

## 2022-02-14 DIAGNOSIS — F41.1 GAD (GENERALIZED ANXIETY DISORDER): ICD-10-CM

## 2022-02-14 PROCEDURE — 90834 PR PSYCHOTHERAPY W/PATIENT, 45 MIN: ICD-10-PCS | Mod: S$GLB,,, | Performed by: SOCIAL WORKER

## 2022-02-14 PROCEDURE — 99999 PR PBB SHADOW E&M-EST. PATIENT-LVL I: CPT | Mod: PBBFAC,,, | Performed by: SOCIAL WORKER

## 2022-02-14 PROCEDURE — 1159F PR MEDICATION LIST DOCUMENTED IN MEDICAL RECORD: ICD-10-PCS | Mod: CPTII,S$GLB,, | Performed by: SOCIAL WORKER

## 2022-02-14 PROCEDURE — 90834 PSYTX W PT 45 MINUTES: CPT | Mod: S$GLB,,, | Performed by: SOCIAL WORKER

## 2022-02-14 PROCEDURE — 99999 PR PBB SHADOW E&M-EST. PATIENT-LVL I: ICD-10-PCS | Mod: PBBFAC,,, | Performed by: SOCIAL WORKER

## 2022-02-14 PROCEDURE — 1159F MED LIST DOCD IN RCRD: CPT | Mod: CPTII,S$GLB,, | Performed by: SOCIAL WORKER

## 2022-02-14 SDOH — SOCIAL DETERMINANTS OF HEALTH (SDOH): PROBLEM RELATED TO PRIMARY SUPPORT GROUP, UNSPECIFIED: Z63.9

## 2022-02-21 ENCOUNTER — LAB VISIT (OUTPATIENT)
Dept: LAB | Facility: HOSPITAL | Age: 62
End: 2022-02-21
Attending: FAMILY MEDICINE
Payer: COMMERCIAL

## 2022-02-21 DIAGNOSIS — Z00.00 ANNUAL PHYSICAL EXAM: ICD-10-CM

## 2022-02-21 LAB
ALBUMIN SERPL BCP-MCNC: 3.9 G/DL (ref 3.5–5.2)
ALP SERPL-CCNC: 97 U/L (ref 55–135)
ALT SERPL W/O P-5'-P-CCNC: 19 U/L (ref 10–44)
ANION GAP SERPL CALC-SCNC: 10 MMOL/L (ref 8–16)
AST SERPL-CCNC: 21 U/L (ref 10–40)
BASOPHILS # BLD AUTO: 0.09 K/UL (ref 0–0.2)
BASOPHILS NFR BLD: 1.2 % (ref 0–1.9)
BILIRUB SERPL-MCNC: 0.6 MG/DL (ref 0.1–1)
BUN SERPL-MCNC: 16 MG/DL (ref 8–23)
CALCIUM SERPL-MCNC: 10.2 MG/DL (ref 8.7–10.5)
CHLORIDE SERPL-SCNC: 103 MMOL/L (ref 95–110)
CHOLEST SERPL-MCNC: 215 MG/DL (ref 120–199)
CHOLEST/HDLC SERPL: 3.5 {RATIO} (ref 2–5)
CO2 SERPL-SCNC: 27 MMOL/L (ref 23–29)
CREAT SERPL-MCNC: 1 MG/DL (ref 0.5–1.4)
DIFFERENTIAL METHOD: ABNORMAL
EOSINOPHIL # BLD AUTO: 0.2 K/UL (ref 0–0.5)
EOSINOPHIL NFR BLD: 2 % (ref 0–8)
ERYTHROCYTE [DISTWIDTH] IN BLOOD BY AUTOMATED COUNT: 14 % (ref 11.5–14.5)
EST. GFR  (AFRICAN AMERICAN): >60 ML/MIN/1.73 M^2
EST. GFR  (NON AFRICAN AMERICAN): 59.7 ML/MIN/1.73 M^2
ESTIMATED AVG GLUCOSE: 111 MG/DL (ref 68–131)
GLUCOSE SERPL-MCNC: 96 MG/DL (ref 70–110)
HBA1C MFR BLD: 5.5 % (ref 4–5.6)
HCT VFR BLD AUTO: 41.1 % (ref 37–48.5)
HDLC SERPL-MCNC: 62 MG/DL (ref 40–75)
HDLC SERPL: 28.8 % (ref 20–50)
HGB BLD-MCNC: 13.1 G/DL (ref 12–16)
IMM GRANULOCYTES # BLD AUTO: 0.02 K/UL (ref 0–0.04)
IMM GRANULOCYTES NFR BLD AUTO: 0.3 % (ref 0–0.5)
LDLC SERPL CALC-MCNC: 130.4 MG/DL (ref 63–159)
LYMPHOCYTES # BLD AUTO: 3.3 K/UL (ref 1–4.8)
LYMPHOCYTES NFR BLD: 44.2 % (ref 18–48)
MCH RBC QN AUTO: 32.1 PG (ref 27–31)
MCHC RBC AUTO-ENTMCNC: 31.9 G/DL (ref 32–36)
MCV RBC AUTO: 101 FL (ref 82–98)
MONOCYTES # BLD AUTO: 0.7 K/UL (ref 0.3–1)
MONOCYTES NFR BLD: 9.7 % (ref 4–15)
NEUTROPHILS # BLD AUTO: 3.2 K/UL (ref 1.8–7.7)
NEUTROPHILS NFR BLD: 42.6 % (ref 38–73)
NONHDLC SERPL-MCNC: 153 MG/DL
NRBC BLD-RTO: 0 /100 WBC
PLATELET # BLD AUTO: 302 K/UL (ref 150–450)
PMV BLD AUTO: 10.1 FL (ref 9.2–12.9)
POTASSIUM SERPL-SCNC: 4.1 MMOL/L (ref 3.5–5.1)
PROT SERPL-MCNC: 7.6 G/DL (ref 6–8.4)
RBC # BLD AUTO: 4.08 M/UL (ref 4–5.4)
SODIUM SERPL-SCNC: 140 MMOL/L (ref 136–145)
TRIGL SERPL-MCNC: 113 MG/DL (ref 30–150)
TSH SERPL DL<=0.005 MIU/L-ACNC: 0.68 UIU/ML (ref 0.4–4)
WBC # BLD AUTO: 7.45 K/UL (ref 3.9–12.7)

## 2022-02-21 PROCEDURE — 83036 HEMOGLOBIN GLYCOSYLATED A1C: CPT | Performed by: FAMILY MEDICINE

## 2022-02-21 PROCEDURE — 85025 COMPLETE CBC W/AUTO DIFF WBC: CPT | Performed by: FAMILY MEDICINE

## 2022-02-21 PROCEDURE — 80053 COMPREHEN METABOLIC PANEL: CPT | Performed by: FAMILY MEDICINE

## 2022-02-21 PROCEDURE — 36415 COLL VENOUS BLD VENIPUNCTURE: CPT | Mod: PN | Performed by: FAMILY MEDICINE

## 2022-02-21 PROCEDURE — 80061 LIPID PANEL: CPT | Performed by: FAMILY MEDICINE

## 2022-02-21 PROCEDURE — 84443 ASSAY THYROID STIM HORMONE: CPT | Performed by: FAMILY MEDICINE
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
## 2022-03-03 ENCOUNTER — HOSPITAL ENCOUNTER (OUTPATIENT)
Dept: RADIOLOGY | Facility: HOSPITAL | Age: 62
Discharge: HOME | End: 2022-03-03
Attending: FAMILY MEDICINE
Payer: COMMERCIAL

## 2022-03-03 ENCOUNTER — OFFICE VISIT (OUTPATIENT)
Dept: PRIMARY CARE CLINIC | Facility: CLINIC | Age: 62
End: 2022-03-03
Payer: COMMERCIAL

## 2022-03-03 VITALS
HEART RATE: 98 BPM | DIASTOLIC BLOOD PRESSURE: 62 MMHG | TEMPERATURE: 98 F | HEIGHT: 63 IN | OXYGEN SATURATION: 99 % | SYSTOLIC BLOOD PRESSURE: 100 MMHG | WEIGHT: 145.5 LBS | BODY MASS INDEX: 25.78 KG/M2

## 2022-03-03 DIAGNOSIS — M79.604 LOW BACK PAIN RADIATING TO RIGHT LEG: ICD-10-CM

## 2022-03-03 DIAGNOSIS — F31.61 BIPOLAR DISORDER, CURRENT EPISODE MIXED, MILD: ICD-10-CM

## 2022-03-03 DIAGNOSIS — I47.10 SVT (SUPRAVENTRICULAR TACHYCARDIA): ICD-10-CM

## 2022-03-03 DIAGNOSIS — Z12.31 OTHER SCREENING MAMMOGRAM: ICD-10-CM

## 2022-03-03 DIAGNOSIS — F41.1 GAD (GENERALIZED ANXIETY DISORDER): ICD-10-CM

## 2022-03-03 DIAGNOSIS — M54.50 LOW BACK PAIN RADIATING TO RIGHT LEG: ICD-10-CM

## 2022-03-03 DIAGNOSIS — Z00.00 ROUTINE GENERAL MEDICAL EXAMINATION AT A HEALTH CARE FACILITY: Primary | ICD-10-CM

## 2022-03-03 DIAGNOSIS — E78.2 MIXED HYPERLIPIDEMIA: ICD-10-CM

## 2022-03-03 PROCEDURE — 72100 XR LUMBAR SPINE AP AND LATERAL: ICD-10-PCS | Mod: 26,,, | Performed by: RADIOLOGY

## 2022-03-03 PROCEDURE — 99999 PR PBB SHADOW E&M-EST. PATIENT-LVL IV: ICD-10-PCS | Mod: PBBFAC,,, | Performed by: FAMILY MEDICINE

## 2022-03-03 PROCEDURE — 1160F RVW MEDS BY RX/DR IN RCRD: CPT | Mod: CPTII,S$GLB,, | Performed by: FAMILY MEDICINE

## 2022-03-03 PROCEDURE — 72100 X-RAY EXAM L-S SPINE 2/3 VWS: CPT | Mod: 26,,, | Performed by: RADIOLOGY

## 2022-03-03 PROCEDURE — 99396 PR PREVENTIVE VISIT,EST,40-64: ICD-10-PCS | Mod: S$GLB,,, | Performed by: FAMILY MEDICINE

## 2022-03-03 PROCEDURE — 1159F MED LIST DOCD IN RCRD: CPT | Mod: CPTII,S$GLB,, | Performed by: FAMILY MEDICINE

## 2022-03-03 PROCEDURE — 77063 BREAST TOMOSYNTHESIS BI: CPT | Mod: 26,,, | Performed by: RADIOLOGY

## 2022-03-03 PROCEDURE — 1159F PR MEDICATION LIST DOCUMENTED IN MEDICAL RECORD: ICD-10-PCS | Mod: CPTII,S$GLB,, | Performed by: FAMILY MEDICINE

## 2022-03-03 PROCEDURE — 99999 PR PBB SHADOW E&M-EST. PATIENT-LVL IV: CPT | Mod: PBBFAC,,, | Performed by: FAMILY MEDICINE

## 2022-03-03 PROCEDURE — 77067 SCR MAMMO BI INCL CAD: CPT | Mod: TC,PN

## 2022-03-03 PROCEDURE — 3044F PR MOST RECENT HEMOGLOBIN A1C LEVEL <7.0%: ICD-10-PCS | Mod: CPTII,S$GLB,, | Performed by: FAMILY MEDICINE

## 2022-03-03 PROCEDURE — 3078F DIAST BP <80 MM HG: CPT | Mod: CPTII,S$GLB,, | Performed by: FAMILY MEDICINE

## 2022-03-03 PROCEDURE — 3074F PR MOST RECENT SYSTOLIC BLOOD PRESSURE < 130 MM HG: ICD-10-PCS | Mod: CPTII,S$GLB,, | Performed by: FAMILY MEDICINE

## 2022-03-03 PROCEDURE — 3074F SYST BP LT 130 MM HG: CPT | Mod: CPTII,S$GLB,, | Performed by: FAMILY MEDICINE

## 2022-03-03 PROCEDURE — 3078F PR MOST RECENT DIASTOLIC BLOOD PRESSURE < 80 MM HG: ICD-10-PCS | Mod: CPTII,S$GLB,, | Performed by: FAMILY MEDICINE

## 2022-03-03 PROCEDURE — 99396 PREV VISIT EST AGE 40-64: CPT | Mod: S$GLB,,, | Performed by: FAMILY MEDICINE

## 2022-03-03 PROCEDURE — 3044F HG A1C LEVEL LT 7.0%: CPT | Mod: CPTII,S$GLB,, | Performed by: FAMILY MEDICINE

## 2022-03-03 PROCEDURE — 3008F PR BODY MASS INDEX (BMI) DOCUMENTED: ICD-10-PCS | Mod: CPTII,S$GLB,, | Performed by: FAMILY MEDICINE

## 2022-03-03 PROCEDURE — 3008F BODY MASS INDEX DOCD: CPT | Mod: CPTII,S$GLB,, | Performed by: FAMILY MEDICINE

## 2022-03-03 PROCEDURE — 77063 MAMMO DIGITAL SCREENING BILAT WITH TOMO: ICD-10-PCS | Mod: 26,,, | Performed by: RADIOLOGY

## 2022-03-03 PROCEDURE — 77067 MAMMO DIGITAL SCREENING BILAT WITH TOMO: ICD-10-PCS | Mod: 26,,, | Performed by: RADIOLOGY

## 2022-03-03 PROCEDURE — 1160F PR REVIEW ALL MEDS BY PRESCRIBER/CLIN PHARMACIST DOCUMENTED: ICD-10-PCS | Mod: CPTII,S$GLB,, | Performed by: FAMILY MEDICINE

## 2022-03-03 PROCEDURE — 72100 X-RAY EXAM L-S SPINE 2/3 VWS: CPT | Mod: TC,PN

## 2022-03-03 PROCEDURE — 77067 SCR MAMMO BI INCL CAD: CPT | Mod: 26,,, | Performed by: RADIOLOGY

## 2022-03-03 RX ORDER — ZINC GLUCONATE 50 MG
50 TABLET ORAL DAILY
COMMUNITY

## 2022-03-03 RX ORDER — QUETIAPINE 400 MG/1
400 TABLET, FILM COATED, EXTENDED RELEASE ORAL NIGHTLY
COMMUNITY
Start: 2022-02-25

## 2022-03-03 RX ORDER — LATANOPROST 50 UG/ML
SOLUTION/ DROPS OPHTHALMIC
COMMUNITY
Start: 2022-02-17

## 2022-03-03 RX ORDER — DIAZEPAM 2 MG/1
2 TABLET ORAL 3 TIMES DAILY PRN
COMMUNITY
Start: 2022-02-11 | End: 2023-06-28

## 2022-03-03 RX ORDER — UBIDECARENONE 75 MG
5000 CAPSULE ORAL DAILY
COMMUNITY
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
## 2022-03-03 NOTE — PROGRESS NOTES
Hello.     Your back LUMBAR Xray does not show anything worrisome.     Please let me know if you have any worsening or persistent symptoms.    Take care

## 2022-03-08 ENCOUNTER — OFFICE VISIT (OUTPATIENT)
Dept: PSYCHIATRY | Facility: CLINIC | Age: 62
End: 2022-03-08
Payer: COMMERCIAL

## 2022-03-08 DIAGNOSIS — F43.0 ACUTE STRESS REACTION: ICD-10-CM

## 2022-03-08 DIAGNOSIS — F31.61 BIPOLAR DISORDER, CURRENT EPISODE MIXED, MILD: Primary | ICD-10-CM

## 2022-03-08 DIAGNOSIS — F41.1 GAD (GENERALIZED ANXIETY DISORDER): ICD-10-CM

## 2022-03-08 DIAGNOSIS — Z63.9 FAMILY PROBLEMS: ICD-10-CM

## 2022-03-08 PROCEDURE — 3044F HG A1C LEVEL LT 7.0%: CPT | Mod: CPTII,S$GLB,, | Performed by: SOCIAL WORKER

## 2022-03-08 PROCEDURE — 90834 PR PSYCHOTHERAPY W/PATIENT, 45 MIN: ICD-10-PCS | Mod: S$GLB,,, | Performed by: SOCIAL WORKER

## 2022-03-08 PROCEDURE — 90834 PSYTX W PT 45 MINUTES: CPT | Mod: S$GLB,,, | Performed by: SOCIAL WORKER

## 2022-03-08 PROCEDURE — 3044F PR MOST RECENT HEMOGLOBIN A1C LEVEL <7.0%: ICD-10-PCS | Mod: CPTII,S$GLB,, | Performed by: SOCIAL WORKER

## 2022-03-08 SDOH — SOCIAL DETERMINANTS OF HEALTH (SDOH): PROBLEM RELATED TO PRIMARY SUPPORT GROUP, UNSPECIFIED: Z63.9

## 2022-03-09 ENCOUNTER — TELEPHONE (OUTPATIENT)
Dept: PRIMARY CARE CLINIC | Facility: CLINIC | Age: 62
End: 2022-03-09
Payer: COMMERCIAL

## 2022-03-09 NOTE — TELEPHONE ENCOUNTER
----- Message from Whitney Fofana sent at 3/9/2022 12:00 PM CST -----  Contact: Pt [phone]  Calling to get test results.  Name of test (lab, x-ray): X-Ray   Date of test: 3/3  Where was the test performed: [facility]  Would you like a call back, or a response through your [facility] portal?:   call back   Comments:

## 2022-03-10 NOTE — TELEPHONE ENCOUNTER
My chart and voice message sent to patient Dr. Dalal sent message via my [facility].

## 2022-03-16 ENCOUNTER — TELEPHONE (OUTPATIENT)
Dept: PRIMARY CARE CLINIC | Facility: CLINIC | Age: 62
End: 2022-03-16
Payer: COMMERCIAL

## 2022-03-16 NOTE — PROGRESS NOTES
Individual Psychotherapy (PhD/LCSW)    3/8/2022    Site:  [facility]         Therapeutic Intervention: Met with patient.  Outpatient - Insight oriented psychotherapy 45 min - CPT code 22664, Outpatient - Behavior modifying psychotherapy 45 min - CPT code 73696, Outpatient - Supportive psychotherapy 45 min - CPT Code 83713 and Outpatient - Interactive psychotherapy 45 min - CPT code 35886    Chief complaint/reason for encounter: depression, mood swings, anxiety, behavior and interpersonal.     Interval history and content of current session: Pt was last seen 2-weeks ago. Pt continues to report she has had another difficult 2 weeks. Pt continues to be overwhelmed with her current family situation. Pt continue s to work on adopting her 19 month grand daughter and has her 3 month old grand baby-sister of the 19-month old. Pt describes having to continue to work with this new born who continues to be weaned of illicit drugs. Pt reports her niece continues to be pursuing steps to become the other baby's  with the intention of pursuing adoption. Pt continues to report she has struggled with dealing with her own depression and not letting it get her down and where she can't function.    Clinician continues to work with pt on ways pt can work on resuming some sense of her usual routine- work on securing regular - work on self care.  Pt reports she continues to be fostering her 19-month old grand-daughter who has been in their care for the past 18-months. Pt reports they have a hearing for the baby in March and then they will be able to move the case to adoptions. Pt continues to report she has received a lot of support from her friends however the demand of having to raise her grandbaby, care for her , and her sister-in-law (Marisela) with terminal cancer is taking a toll on her. Pt reports to continue to follow Dr. Lourdes Mack for medication mgmt and reports to be keeping up with monthly  appts with her treating psychiatrist.  Pt reports she continues to be very involved with her family.  Pt stated she and her  and continue to stay connected to her close friends.  Insight/Judgement: adequate. Denies any SI/HI, NO A/V/H.   Focus: self care- exploring her anger about her daughter and fears her daughter is going to interfere with either adoption.   Clinician recommended pt to check in sooner with her treating psychiatrist pt presents manic with pressured rapid speech and racing thoughts.     Treatment plan:  · Target symptoms: manic sx's, dis tractability, lack of focus, anxiety, recurrent depression, mood disorder, confusion, adjustment, family stress such as: caring for  and 19-month old babies that were born addicted to drugs,  is blind, sister-in-law dx'd with stage IV cancer-is her care taker and transports to all appts, legal issues with adoption and dealing with her daughter who is an addict.   · Why chosen therapy is appropriate versus another modality: relevant to diagnosis, patient responds to this modality, evidence based practice  · Outcome monitoring methods: self-report, observation, feedback from family  · Therapeutic intervention type: insight oriented psychotherapy, behavior modifying psychotherapy, supportive psychotherapy, interactive psychotherapy    Risk parameters:  Patient reports no suicidal ideation  Patient reports no homicidal ideation  Patient reports no self-injurious behavior  Patient reports no violent behavior    Verbal deficits: None    Patient's response to intervention:  The patient's response to intervention is accepting.    Progress toward goals and other mental status changes:  The patient's progress toward goals is progressing. .    Diagnosis:     ICD-10-CM  PL            1. Bipolar disorder, current episode mixed, mild F31.61 296.61    2. HUMPHREY (generalized anxiety disorder) F41.1 300.02    3. Acute stress reaction F43.0 308.9    4. Death of  family member Z63.4 V62.82    5. Sudden death R99 798.1           Plan:  individual psychotherapy and medication management by physician    Return to clinic: 2 weeks    Length of Service (minutes): 45

## 2022-03-16 NOTE — TELEPHONE ENCOUNTER
----- Message from Nova Hernandez sent at 3/16/2022 10:01 AM CDT -----  Contact: Pt [phone]  Calling to get test results.  Name of test (lab, x-ray): xray  Date of test: 3/3/22  Where was the test performed: [facility]  Would you like a call back, or a response through your [facility] portal?:   Call back  Comments:     Please call and advise.    Thank You

## 2022-04-04 ENCOUNTER — OFFICE VISIT (OUTPATIENT)
Dept: PSYCHIATRY | Facility: CLINIC | Age: 62
End: 2022-04-04
Payer: COMMERCIAL

## 2022-04-04 DIAGNOSIS — Z63.9 FAMILY PROBLEMS: ICD-10-CM

## 2022-04-04 DIAGNOSIS — F43.0 ACUTE STRESS REACTION: ICD-10-CM

## 2022-04-04 DIAGNOSIS — F41.1 GAD (GENERALIZED ANXIETY DISORDER): ICD-10-CM

## 2022-04-04 DIAGNOSIS — F31.61 BIPOLAR DISORDER, CURRENT EPISODE MIXED, MILD: Primary | ICD-10-CM

## 2022-04-04 PROCEDURE — 90834 PR PSYCHOTHERAPY W/PATIENT, 45 MIN: ICD-10-PCS | Mod: S$GLB,,, | Performed by: SOCIAL WORKER

## 2022-04-04 PROCEDURE — 99999 PR PBB SHADOW E&M-EST. PATIENT-LVL I: CPT | Mod: PBBFAC,,, | Performed by: SOCIAL WORKER

## 2022-04-04 PROCEDURE — 1159F PR MEDICATION LIST DOCUMENTED IN MEDICAL RECORD: ICD-10-PCS | Mod: CPTII,S$GLB,, | Performed by: SOCIAL WORKER

## 2022-04-04 PROCEDURE — 3044F PR MOST RECENT HEMOGLOBIN A1C LEVEL <7.0%: ICD-10-PCS | Mod: CPTII,S$GLB,, | Performed by: SOCIAL WORKER

## 2022-04-04 PROCEDURE — 99999 PR PBB SHADOW E&M-EST. PATIENT-LVL I: ICD-10-PCS | Mod: PBBFAC,,, | Performed by: SOCIAL WORKER

## 2022-04-04 PROCEDURE — 3044F HG A1C LEVEL LT 7.0%: CPT | Mod: CPTII,S$GLB,, | Performed by: SOCIAL WORKER

## 2022-04-04 PROCEDURE — 90834 PSYTX W PT 45 MINUTES: CPT | Mod: S$GLB,,, | Performed by: SOCIAL WORKER

## 2022-04-04 PROCEDURE — 1159F MED LIST DOCD IN RCRD: CPT | Mod: CPTII,S$GLB,, | Performed by: SOCIAL WORKER

## 2022-04-04 SDOH — SOCIAL DETERMINANTS OF HEALTH (SDOH): PROBLEM RELATED TO PRIMARY SUPPORT GROUP, UNSPECIFIED: Z63.9

## 2022-04-05 NOTE — PROGRESS NOTES
Individual Psychotherapy (PhD/LCSW)    4/4/2022    Site:  [facility]         Therapeutic Intervention: Met with patient.  Outpatient - Insight oriented psychotherapy 45 min - CPT code 91882, Outpatient - Behavior modifying psychotherapy 45 min - CPT code 96697, Outpatient - Supportive psychotherapy 45 min - CPT Code 03987 and Outpatient - Interactive psychotherapy 45 min - CPT code 42622    Chief complaint/reason for encounter: depression, mood swings, anxiety, behavior and interpersonal.     Interval history and content of current session: Pt was last seen 1-month ago. Pt reports there have been more developments with her niece who now us willing to adopt both of her grand babies that she and her  are currently raising. This is definitely the best option for the 2-yr old & the 5-month old baby girls who were both born drug dependent and have been weaned off of drugs by pt and her .  While pt is aware that this offer makes sense it is going to be very hard to let them go and transition to her niece & nephew's home. Pt reviewed a list of concerns with clinician on items she would like to inquire about with her grandchildren and her level of involvement.  Pt continues to be overwhelmed with her current family situation. Pt continues to report she has struggled with dealing with her own depression and not letting it get her down and where she can't function. Clinician continues to work with pt on ways pt can work on resuming some sense of her usual routine- work on securing regular - work on self care.  Pt reports the hearing did occur earlier in March where her daughter's rights to Fina have been officially terminated and so now the adoption process will be in full swing however this will transition to her niece and nephew instead of pt and her .  Pt continues to report she has received a lot of support from her friends however the demand of having to raise her grandbaby, care  for her , and her sister-in-law (Marisela) with terminal cancer is taking a toll on her. Pt reports to continue to follow Dr. Lourdes Mack for medication mgmt and reports to be keeping up with monthly appts with her treating psychiatrist.  Pt reports she continues to be very involved with her family.  Pt stated she and her  and continue to stay connected to her close friends.  Insight/Judgement: adequate. Denies any SI/HI, NO A/V/H.   Focus: self care- exploring her anger about her daughter and fears her daughter is going to interfere with either adoption.   Clinician recommended pt to check in sooner with her treating psychiatrist pt presents manic with pressured rapid speech and racing thoughts.     Treatment plan:  · Target symptoms: manic sx's, dis tractability, lack of focus, anxiety, recurrent depression, mood disorder, confusion, adjustment, family stress such as: caring for  and 19-month old babies that were born addicted to drugs,  is blind, sister-in-law dx'd with stage IV cancer-is her care taker and transports to all appts, legal issues with adoption and dealing with her daughter who is an addict.   · Why chosen therapy is appropriate versus another modality: relevant to diagnosis, patient responds to this modality, evidence based practice  · Outcome monitoring methods: self-report, observation, feedback from family  · Therapeutic intervention type: insight oriented psychotherapy, behavior modifying psychotherapy, supportive psychotherapy, interactive psychotherapy    Risk parameters:  Patient reports no suicidal ideation  Patient reports no homicidal ideation  Patient reports no self-injurious behavior  Patient reports no violent behavior    Verbal deficits: None    Patient's response to intervention:  The patient's response to intervention is accepting.    Progress toward goals and other mental status changes:  The patient's progress toward goals is progressing.  .    Diagnosis:     ICD-10-CM  PL            1. Bipolar disorder, current episode mixed, mild F31.61 296.61    2. HUMPHREY (generalized anxiety disorder) F41.1 300.02    3. Acute stress reaction F43.0 308.9    4. Death of family member Z63.4 V62.82    5. Sudden death R99 798.1           Plan:  individual psychotherapy and medication management by physician    Return to clinic: 2 weeks    Length of Service (minutes): 45

## 2022-04-18 ENCOUNTER — TELEPHONE (OUTPATIENT)
Dept: PRIMARY CARE CLINIC | Facility: CLINIC | Age: 62
End: 2022-04-18
Payer: COMMERCIAL

## 2022-04-19 ENCOUNTER — TELEPHONE (OUTPATIENT)
Dept: PRIMARY CARE CLINIC | Facility: CLINIC | Age: 62
End: 2022-04-19
Payer: COMMERCIAL

## 2022-04-19 NOTE — TELEPHONE ENCOUNTER
----- Message from Whitney Fofana sent at 4/19/2022 12:01 PM CDT -----  Contact: Arpan [phone]  Fax Number to Dr Mack fax Number [phone] last lab work needs to be faxed.     Thank you

## 2022-05-09 ENCOUNTER — OFFICE VISIT (OUTPATIENT)
Dept: PSYCHIATRY | Facility: CLINIC | Age: 62
End: 2022-05-09
Payer: COMMERCIAL

## 2022-05-09 DIAGNOSIS — Z63.9 FAMILY PROBLEMS: ICD-10-CM

## 2022-05-09 DIAGNOSIS — F31.61 BIPOLAR DISORDER, CURRENT EPISODE MIXED, MILD: Primary | ICD-10-CM

## 2022-05-09 DIAGNOSIS — F41.1 GAD (GENERALIZED ANXIETY DISORDER): ICD-10-CM

## 2022-05-09 PROCEDURE — 99999 PR PBB SHADOW E&M-EST. PATIENT-LVL I: ICD-10-PCS | Mod: PBBFAC,,, | Performed by: SOCIAL WORKER

## 2022-05-09 PROCEDURE — 99999 PR PBB SHADOW E&M-EST. PATIENT-LVL I: CPT | Mod: PBBFAC,,, | Performed by: SOCIAL WORKER

## 2022-05-09 PROCEDURE — 90834 PR PSYCHOTHERAPY W/PATIENT, 45 MIN: ICD-10-PCS | Mod: S$GLB,,, | Performed by: SOCIAL WORKER

## 2022-05-09 PROCEDURE — 3044F HG A1C LEVEL LT 7.0%: CPT | Mod: CPTII,S$GLB,, | Performed by: SOCIAL WORKER

## 2022-05-09 PROCEDURE — 90834 PSYTX W PT 45 MINUTES: CPT | Mod: S$GLB,,, | Performed by: SOCIAL WORKER

## 2022-05-09 PROCEDURE — 1159F MED LIST DOCD IN RCRD: CPT | Mod: CPTII,S$GLB,, | Performed by: SOCIAL WORKER

## 2022-05-09 PROCEDURE — 1159F PR MEDICATION LIST DOCUMENTED IN MEDICAL RECORD: ICD-10-PCS | Mod: CPTII,S$GLB,, | Performed by: SOCIAL WORKER

## 2022-05-09 PROCEDURE — 3044F PR MOST RECENT HEMOGLOBIN A1C LEVEL <7.0%: ICD-10-PCS | Mod: CPTII,S$GLB,, | Performed by: SOCIAL WORKER

## 2022-05-09 SDOH — SOCIAL DETERMINANTS OF HEALTH (SDOH): PROBLEM RELATED TO PRIMARY SUPPORT GROUP, UNSPECIFIED: Z63.9

## 2022-05-23 NOTE — PROGRESS NOTES
"  Individual Psychotherapy (PhD/LCSW)    5/9/2022    Site:  [facility]         Therapeutic Intervention: Met with patient.  Outpatient - Insight oriented psychotherapy 45 min - CPT code 36970, Outpatient - Behavior modifying psychotherapy 45 min - CPT code 15287, Outpatient - Supportive psychotherapy 45 min - CPT Code 59271 and Outpatient - Interactive psychotherapy 45 min - CPT code 66986    Chief complaint/reason for encounter: depression, mood swings, anxiety, behavior and interpersonal.     Interval history and content of current session: Pt was last seen 1-month ago. Pt reports she continues to work with her niece who plans to adopt both of her grand babies that she has been caring for since they were born. Pt continues to report she is struggling with symptoms of depression and worries that this will progress and she is not able to "shut down" as she would normally do.  Pt reviewed a list of concerns with clinician on items she would like to inquire about with her grandchildren and her level of involvement.  Pt continues to be overwhelmed with her current family situation-Pt continues to discuss the anger she has for her daughter who is a severe meth/heroin addict and delivered two babies less than a year apart both babies were born with severe drug withdrawal symptoms. Daughter abandoned the second baby at the hospital the first baby was taken into custody due to testing positive for both heroin/cociaine. Pt reports her daughter has attempted to reach out to her and her  requesting to see the babies and now that she is no longer required to have visitations with her daughter and the babies she is not taking her calls. Clinician continues to work with pt on ways pt can work on resuming some sense of her usual routine- work on securing regular - work on self care. Pt continues to report she has received a lot of support from her friends however the demand of having to raise her " terry, care for her , and her sister-in-law (Marisela) with terminal cancer is taking a toll on her. Pt reports to continue to follow Dr. Lourdes Mack for medication mgmt and reports to be keeping up with monthly appts with her treating psychiatrist.  Pt reports she continues to be very involved with her family.  Pt stated she and her  and continue to stay connected to her close friends.  Insight/Judgement: adequate. Denies any SI/HI, NO A/V/H.   Focus: self care- exploring her anger about her daughter and fears her daughter is going to interfere with either adoption.   Clinician recommended pt to check in sooner with her treating psychiatrist pt presents manic with pressured rapid speech and racing thoughts.     Treatment plan:  · Target symptoms: manic sx's, dis tractability, lack of focus, anxiety, recurrent depression, mood disorder, confusion, adjustment, family stress such as: caring for  and 19-month old babies that were born addicted to drugs,  is blind, sister-in-law dx'd with stage IV cancer-is her care taker and transports to all appts, legal issues with adoption and dealing with her daughter who is an addict.   · Why chosen therapy is appropriate versus another modality: relevant to diagnosis, patient responds to this modality, evidence based practice  · Outcome monitoring methods: self-report, observation, feedback from family  · Therapeutic intervention type: insight oriented psychotherapy, behavior modifying psychotherapy, supportive psychotherapy, interactive psychotherapy    Risk parameters:  Patient reports no suicidal ideation  Patient reports no homicidal ideation  Patient reports no self-injurious behavior  Patient reports no violent behavior    Verbal deficits: None    Patient's response to intervention:  The patient's response to intervention is accepting.    Progress toward goals and other mental status changes:  The patient's progress toward goals is progressing.  .    Diagnosis:     ICD-10-CM  PL            1. Bipolar disorder, current episode mixed, mild F31.61 296.61    2. HUMPHREY (generalized anxiety disorder) F41.1 300.02    3. Acute stress reaction F43.0 308.9    4. Death of family member Z63.4 V62.82    5. Sudden death R99 798.1           Plan:  individual psychotherapy and medication management by physician    Return to clinic: 2 weeks    Length of Service (minutes): 45

## 2022-05-25 RX ORDER — ROSUVASTATIN CALCIUM 10 MG/1
TABLET, COATED ORAL
Qty: 90 TABLET | Refills: 2 | Status: SHIPPED | OUTPATIENT
Start: 2022-05-25 | End: 2022-12-09

## 2022-05-25 NOTE — TELEPHONE ENCOUNTER
Refill Authorization Note   Kimberly Sandro  is requesting a refill authorization.  Brief Assessment and Rationale for Refill:  Approve     Medication Therapy Plan:       Medication Reconciliation Completed: No   Comments:     No Care Gaps recommended.     Note composed:11:56 AM 05/25/2022

## 2022-05-25 NOTE — TELEPHONE ENCOUNTER
No new care gaps identified.  [facility] Embedded Care Gaps. Reference number: [id number]. 5/25/2022   8:52:48 AM CDT

## 2022-07-25 ENCOUNTER — OFFICE VISIT (OUTPATIENT)
Dept: PSYCHIATRY | Facility: CLINIC | Age: 62
End: 2022-07-25
Payer: COMMERCIAL

## 2022-07-25 DIAGNOSIS — F31.61 BIPOLAR DISORDER, CURRENT EPISODE MIXED, MILD: ICD-10-CM

## 2022-07-25 DIAGNOSIS — F41.1 GAD (GENERALIZED ANXIETY DISORDER): Primary | ICD-10-CM

## 2022-07-25 DIAGNOSIS — Z63.9 FAMILY PROBLEMS: ICD-10-CM

## 2022-07-25 PROCEDURE — 3044F PR MOST RECENT HEMOGLOBIN A1C LEVEL <7.0%: ICD-10-PCS | Mod: CPTII,S$GLB,, | Performed by: SOCIAL WORKER

## 2022-07-25 PROCEDURE — 90834 PR PSYCHOTHERAPY W/PATIENT, 45 MIN: ICD-10-PCS | Mod: S$GLB,,, | Performed by: SOCIAL WORKER

## 2022-07-25 PROCEDURE — 99999 PR PBB SHADOW E&M-EST. PATIENT-LVL I: ICD-10-PCS | Mod: PBBFAC,,, | Performed by: SOCIAL WORKER

## 2022-07-25 PROCEDURE — 99999 PR PBB SHADOW E&M-EST. PATIENT-LVL I: CPT | Mod: PBBFAC,,, | Performed by: SOCIAL WORKER

## 2022-07-25 PROCEDURE — 3044F HG A1C LEVEL LT 7.0%: CPT | Mod: CPTII,S$GLB,, | Performed by: SOCIAL WORKER

## 2022-07-25 PROCEDURE — 90834 PSYTX W PT 45 MINUTES: CPT | Mod: S$GLB,,, | Performed by: SOCIAL WORKER

## 2022-07-25 PROCEDURE — 1159F MED LIST DOCD IN RCRD: CPT | Mod: CPTII,S$GLB,, | Performed by: SOCIAL WORKER

## 2022-07-25 PROCEDURE — 1159F PR MEDICATION LIST DOCUMENTED IN MEDICAL RECORD: ICD-10-PCS | Mod: CPTII,S$GLB,, | Performed by: SOCIAL WORKER

## 2022-07-25 SDOH — SOCIAL DETERMINANTS OF HEALTH (SDOH): PROBLEM RELATED TO PRIMARY SUPPORT GROUP, UNSPECIFIED: Z63.9

## 2022-08-10 ENCOUNTER — OFFICE VISIT (OUTPATIENT)
Dept: PSYCHIATRY | Facility: CLINIC | Age: 62
End: 2022-08-10
Payer: COMMERCIAL

## 2022-08-10 DIAGNOSIS — Z63.9 FAMILY PROBLEMS: ICD-10-CM

## 2022-08-10 DIAGNOSIS — F43.0 ACUTE STRESS REACTION: ICD-10-CM

## 2022-08-10 DIAGNOSIS — F31.61 BIPOLAR DISORDER, CURRENT EPISODE MIXED, MILD: Primary | ICD-10-CM

## 2022-08-10 DIAGNOSIS — F41.1 GAD (GENERALIZED ANXIETY DISORDER): ICD-10-CM

## 2022-08-10 PROCEDURE — 3044F HG A1C LEVEL LT 7.0%: CPT | Mod: CPTII,S$GLB,, | Performed by: SOCIAL WORKER

## 2022-08-10 PROCEDURE — 90834 PR PSYCHOTHERAPY W/PATIENT, 45 MIN: ICD-10-PCS | Mod: S$GLB,,, | Performed by: SOCIAL WORKER

## 2022-08-10 PROCEDURE — 99999 PR PBB SHADOW E&M-EST. PATIENT-LVL I: CPT | Mod: PBBFAC,,, | Performed by: SOCIAL WORKER

## 2022-08-10 PROCEDURE — 1159F MED LIST DOCD IN RCRD: CPT | Mod: CPTII,S$GLB,, | Performed by: SOCIAL WORKER

## 2022-08-10 PROCEDURE — 1159F PR MEDICATION LIST DOCUMENTED IN MEDICAL RECORD: ICD-10-PCS | Mod: CPTII,S$GLB,, | Performed by: SOCIAL WORKER

## 2022-08-10 PROCEDURE — 99999 PR PBB SHADOW E&M-EST. PATIENT-LVL I: ICD-10-PCS | Mod: PBBFAC,,, | Performed by: SOCIAL WORKER

## 2022-08-10 PROCEDURE — 3044F PR MOST RECENT HEMOGLOBIN A1C LEVEL <7.0%: ICD-10-PCS | Mod: CPTII,S$GLB,, | Performed by: SOCIAL WORKER

## 2022-08-10 PROCEDURE — 90834 PSYTX W PT 45 MINUTES: CPT | Mod: S$GLB,,, | Performed by: SOCIAL WORKER

## 2022-08-10 SDOH — SOCIAL DETERMINANTS OF HEALTH (SDOH): PROBLEM RELATED TO PRIMARY SUPPORT GROUP, UNSPECIFIED: Z63.9

## 2022-08-10 NOTE — PROGRESS NOTES
"  Individual Psychotherapy (PhD/LCSW)    7/25/2022    Site:  [facility]         Therapeutic Intervention: Met with patient.  Outpatient - Insight oriented psychotherapy 45 min - CPT code 30946, Outpatient - Behavior modifying psychotherapy 45 min - CPT code 66964, Outpatient - Supportive psychotherapy 45 min - CPT Code 12943 and Outpatient - Interactive psychotherapy 45 min - CPT code 59964    Chief complaint/reason for encounter: depression, mood swings, anxiety, behavior and interpersonal.     Interval history and content of current session: Pt was last seen 1-month ago. Pt reports she continues to work with her niece who plans to adopt both of her grand babies that she has been caring for since they were born. Pt continues to report she is struggling with symptoms of depression and worries that this will progress and she is not able to "shut down" as she would normally do.  Pt reviewed a list of concerns with clinician on items she would like to inquire about with her grandchildren and her level of involvement. Pt reports her two grand-daughters (3 yr & 1.5yrs) have been officially transferred and adopted by her niece.  Pt continues to report she has received a lot of support from her friends however the demand of having to raise her grandbaby, care for her , and her sister-in-law (Marisela) with terminal cancer is taking a toll on her. Pt reports to continue to follow Dr. Lourdes Mack for medication mgmt and reports to be keeping up with monthly appts with her treating psychiatrist.  Pt reports she continues to be very involved with her family.  Pt stated she and her  and continue to stay connected to her close friends.  Insight/Judgement: adequate. Denies any SI/HI, NO A/V/H.   Focus: self care- exploring her anger about her daughter and fears her daughter is going to interfere with either adoption.   Clinician recommended pt to check in sooner with her treating psychiatrist pt presents manic " with pressured rapid speech and racing thoughts.     Treatment plan:  · Target symptoms: manic sx's, dis tractability, lack of focus, anxiety, recurrent depression, mood disorder, confusion, adjustment, family stress such as: caring for  and 19-month old babies that were born addicted to drugs,  is blind, sister-in-law dx'd with stage IV cancer-is her care taker and transports to all appts, legal issues with adoption and dealing with her daughter who is an addict.   · Why chosen therapy is appropriate versus another modality: relevant to diagnosis, patient responds to this modality, evidence based practice  · Outcome monitoring methods: self-report, observation, feedback from family  · Therapeutic intervention type: insight oriented psychotherapy, behavior modifying psychotherapy, supportive psychotherapy, interactive psychotherapy    Risk parameters:  Patient reports no suicidal ideation  Patient reports no homicidal ideation  Patient reports no self-injurious behavior  Patient reports no violent behavior    Verbal deficits: None    Patient's response to intervention:  The patient's response to intervention is accepting.    Progress toward goals and other mental status changes:  The patient's progress toward goals is progressing. .    Diagnosis:     ICD-10-CM  PL            1. Bipolar disorder, current episode mixed, mild F31.61 296.61    2. HUMPHREY (generalized anxiety disorder) F41.1 300.02    3. Acute stress reaction F43.0 308.9    4. Death of family member Z63.4 V62.82    5. Sudden death R99 798.1           Plan:  individual psychotherapy and medication management by physician    Return to clinic: 2 weeks    Length of Service (minutes): 45

## 2022-08-23 NOTE — PROGRESS NOTES
"  Individual Psychotherapy (PhD/LCSW)    8/10/2022    Site:  [facility]         Therapeutic Intervention: Met with patient.  Outpatient - Insight oriented psychotherapy 45 min - CPT code 16640, Outpatient - Behavior modifying psychotherapy 45 min - CPT code 20036, Outpatient - Supportive psychotherapy 45 min - CPT Code 00111 and Outpatient - Interactive psychotherapy 45 min - CPT code 06411    Chief complaint/reason for encounter: depression, mood swings, anxiety, behavior and interpersonal.     Interval history and content of current session: Pt was last seen 1-month ago. Pt reports her grand daughters have finally been transitioned to her niece's home full time. Pt continues to report she is struggling with symptoms of depression and worries that this will progress and she is not able to "shut down" as she would normally do.  Pt reviewed a list of concerns with clinician on items she would like to inquire about with her grandchildren and her level of involvement. Pt reports her two grand-daughters (3 yr & 1.5yrs) have been officially transferred and adopted by her niece.  Pt reports to continue to follow Dr. Lourdes Mack for medication mgmt and reports to be keeping up with monthly appts with her treating psychiatrist.  Pt reports she continues to be very involved with her family.  Pt stated she and her  and continue to stay connected to her close friends.  Insight/Judgement: adequate. Denies any SI/HI, NO A/V/H.   Focus: self care- exploring her anger about her daughter and fears her daughter is going to interfere with either adoption.   Clinician recommended pt to check in sooner with her treating psychiatrist pt presents manic with pressured rapid speech and racing thoughts.     Treatment plan:  · Target symptoms: manic sx's, dis tractability, lack of focus, anxiety, recurrent depression, mood disorder, confusion, adjustment, family stress such as: caring for  and 19-month old babies that " were born addicted to drugs,  is blind, sister-in-law dx'd with stage IV cancer-is her care taker and transports to all appts, legal issues with adoption and dealing with her daughter who is an addict.   · Why chosen therapy is appropriate versus another modality: relevant to diagnosis, patient responds to this modality, evidence based practice  · Outcome monitoring methods: self-report, observation, feedback from family  · Therapeutic intervention type: insight oriented psychotherapy, behavior modifying psychotherapy, supportive psychotherapy, interactive psychotherapy    Risk parameters:  Patient reports no suicidal ideation  Patient reports no homicidal ideation  Patient reports no self-injurious behavior  Patient reports no violent behavior    Verbal deficits: None    Patient's response to intervention:  The patient's response to intervention is accepting.    Progress toward goals and other mental status changes:  The patient's progress toward goals is progressing. .    Diagnosis:     ICD-10-CM  PL            1. Bipolar disorder, current episode mixed, mild F31.61 296.61    2. HUMPHREY (generalized anxiety disorder) F41.1 300.02    3. Acute stress reaction F43.0 308.9    4. Death of family member Z63.4 V62.82    5. Sudden death R99 798.1           Plan:  individual psychotherapy and medication management by physician    Return to clinic: 2 weeks    Length of Service (minutes): 45

## 2022-08-27 ENCOUNTER — TELEPHONE (OUTPATIENT)
Dept: FAMILY MEDICINE | Facility: CLINIC | Age: 62
End: 2022-08-27
Payer: COMMERCIAL

## 2022-08-27 ENCOUNTER — OFFICE VISIT (OUTPATIENT)
Dept: FAMILY MEDICINE | Facility: CLINIC | Age: 62
End: 2022-08-27
Payer: COMMERCIAL

## 2022-08-27 VITALS
BODY MASS INDEX: 23.96 KG/M2 | HEIGHT: 63 IN | OXYGEN SATURATION: 98 % | HEART RATE: 93 BPM | DIASTOLIC BLOOD PRESSURE: 78 MMHG | SYSTOLIC BLOOD PRESSURE: 142 MMHG | WEIGHT: 135.25 LBS

## 2022-08-27 DIAGNOSIS — N75.1 ABSCESS OF RIGHT BARTHOLIN'S GLAND: Primary | ICD-10-CM

## 2022-08-27 PROCEDURE — 99213 PR OFFICE/OUTPT VISIT, EST, LEVL III, 20-29 MIN: ICD-10-PCS | Mod: 25,S$GLB,, | Performed by: FAMILY MEDICINE

## 2022-08-27 PROCEDURE — 3078F DIAST BP <80 MM HG: CPT | Mod: CPTII,S$GLB,, | Performed by: FAMILY MEDICINE

## 2022-08-27 PROCEDURE — 3008F BODY MASS INDEX DOCD: CPT | Mod: CPTII,S$GLB,, | Performed by: FAMILY MEDICINE

## 2022-08-27 PROCEDURE — 99213 OFFICE O/P EST LOW 20 MIN: CPT | Mod: 25,S$GLB,, | Performed by: FAMILY MEDICINE

## 2022-08-27 PROCEDURE — 3078F PR MOST RECENT DIASTOLIC BLOOD PRESSURE < 80 MM HG: ICD-10-PCS | Mod: CPTII,S$GLB,, | Performed by: FAMILY MEDICINE

## 2022-08-27 PROCEDURE — 3077F PR MOST RECENT SYSTOLIC BLOOD PRESSURE >= 140 MM HG: ICD-10-PCS | Mod: CPTII,S$GLB,, | Performed by: FAMILY MEDICINE

## 2022-08-27 PROCEDURE — 1160F RVW MEDS BY RX/DR IN RCRD: CPT | Mod: CPTII,S$GLB,, | Performed by: FAMILY MEDICINE

## 2022-08-27 PROCEDURE — 99999 PR PBB SHADOW E&M-EST. PATIENT-LVL IV: ICD-10-PCS | Mod: PBBFAC,,, | Performed by: FAMILY MEDICINE

## 2022-08-27 PROCEDURE — 1160F PR REVIEW ALL MEDS BY PRESCRIBER/CLIN PHARMACIST DOCUMENTED: ICD-10-PCS | Mod: CPTII,S$GLB,, | Performed by: FAMILY MEDICINE

## 2022-08-27 PROCEDURE — 3077F SYST BP >= 140 MM HG: CPT | Mod: CPTII,S$GLB,, | Performed by: FAMILY MEDICINE

## 2022-08-27 PROCEDURE — 1159F MED LIST DOCD IN RCRD: CPT | Mod: CPTII,S$GLB,, | Performed by: FAMILY MEDICINE

## 2022-08-27 PROCEDURE — 56420 I&D BARTHOLINS GLAND ABSCESS: CPT | Mod: S$GLB,,, | Performed by: FAMILY MEDICINE

## 2022-08-27 PROCEDURE — 56420 PR I&D BARTHOLIN GLAND ABSCESS: ICD-10-PCS | Mod: S$GLB,,, | Performed by: FAMILY MEDICINE

## 2022-08-27 PROCEDURE — 99999 PR PBB SHADOW E&M-EST. PATIENT-LVL IV: CPT | Mod: PBBFAC,,, | Performed by: FAMILY MEDICINE

## 2022-08-27 PROCEDURE — 3008F PR BODY MASS INDEX (BMI) DOCUMENTED: ICD-10-PCS | Mod: CPTII,S$GLB,, | Performed by: FAMILY MEDICINE

## 2022-08-27 PROCEDURE — 1159F PR MEDICATION LIST DOCUMENTED IN MEDICAL RECORD: ICD-10-PCS | Mod: CPTII,S$GLB,, | Performed by: FAMILY MEDICINE

## 2022-08-27 PROCEDURE — 3044F HG A1C LEVEL LT 7.0%: CPT | Mod: CPTII,S$GLB,, | Performed by: FAMILY MEDICINE

## 2022-08-27 PROCEDURE — 3044F PR MOST RECENT HEMOGLOBIN A1C LEVEL <7.0%: ICD-10-PCS | Mod: CPTII,S$GLB,, | Performed by: FAMILY MEDICINE

## 2022-08-27 RX ORDER — GABAPENTIN 100 MG/1
300 CAPSULE ORAL 2 TIMES DAILY
COMMUNITY
Start: 2022-08-15 | End: 2023-03-29

## 2022-08-27 RX ORDER — AMOXICILLIN AND CLAVULANATE POTASSIUM 875; 125 MG/1; MG/1
1 TABLET, FILM COATED ORAL 2 TIMES DAILY
Qty: 20 TABLET | Refills: 0 | Status: SHIPPED | OUTPATIENT
Start: 2022-08-27 | End: 2022-10-11 | Stop reason: ALTCHOICE

## 2022-08-27 NOTE — PROGRESS NOTES
Office Visit    Patient Name: Kimberly Jo    : 1960  MRN: [id number]    Subjective:  Kimberly is a 62 y.o. female who presents today for:    bump on private area    63 yo  female patient of Dr Dalal with h/o SVT/HUMPHREY/Bipolar DO, chronic constipation, no h/o prior boils or abscesses, who presents today with 2 weeks of vaginal pain/ discharge that has increased to a severe right sided vaginal pain in the last 2 days. Over the last 2 days she has noticed severe pain of her right labia especially with pressure, intermittent jelly like discharge, and swelling. No f/c/n/v.    No known inciting factors though she was extensively cleaning her house recently and did have some increased friction of her underwear over her genital area.         PAST MEDICAL HISTORY, SURGICAL/SOCIAL/FAMILY HISTORY REVIEWED AS PER CHART, WITH PERTINENT FINDINGS INCLUDED IN HISTORY SECTION OF NOTE.     Current Medications    Medication List with Changes/Refills   New Medications    AMOXICILLIN-CLAVULANATE 875-125MG (AUGMENTIN) 875-125 MG PER TABLET    Take 1 tablet by mouth 2 (two) times a day.   Current Medications    BIOTIN 2,500 MCG TAB    Take 50,000 mcg by mouth once daily.    CHOLECALCIFEROL, VITAMIN D3, (VITAMIN D3) 50 MCG (2,000 UNIT) TAB    Take by mouth once daily.    CITALOPRAM (CELEXA) 20 MG TABLET    Take 20 mg by mouth once daily.    CYANOCOBALAMIN 500 MCG TABLET    Take 5,000 mcg by mouth once daily.    DIAZEPAM (VALIUM) 2 MG TABLET    Take 2 mg by mouth 3 (three) times daily as needed.    GABAPENTIN (NEURONTIN) 100 MG CAPSULE    Take 300 mg by mouth 2 (two) times daily.    LAMOTRIGINE (LAMICTAL) 200 MG TABLET    2 (two) times daily.     LATANOPROST 0.005 % OPHTHALMIC SOLUTION    INSTILL 1 DROP INTO EACH EYE AT BEDTIME    QUETIAPINE (SEROQUEL XR) 400 MG TB24    Take 400 mg by mouth nightly.    ROSUVASTATIN (CRESTOR) 10 MG TABLET    Take 1 tablet by mouth once daily    VIT A/VIT C/VIT E/ZINC/COPPER (PRESERVISION AREDS ORAL)   "  Take by mouth.    ZINC GLUCONATE 50 MG TABLET    Take 50 mg by mouth once daily.   Discontinued Medications    VARICELLA-ZOSTER GE-AS01B, PF, (SHINGRIX, PF,) 50 MCG/0.5 ML INJECTION    Inject into the muscle.       Allergies   Review of patient's allergies indicates:   Allergen Reactions    Iodine      Other reaction(s): Vomiting    Sulfa (sulfonamide antibiotics)      Other reaction(s): Swelling         Review of Systems (Pertinent positives)  Review of Systems   Constitutional:  Negative for chills and fever.   Gastrointestinal:  Positive for constipation (chronic). Negative for nausea and vomiting.   Genitourinary:  Positive for genital sores and vaginal discharge. Negative for frequency and urgency.   Skin:  Positive for wound.     BP (!) 142/78   Pulse 93   Ht 5' 3" (1.6 m)   Wt 61.4 kg (135 lb 4 oz)   SpO2 98%   BMI 23.96 kg/m²     Physical Exam  Vitals reviewed.   Constitutional:       General: She is not in acute distress.     Appearance: Normal appearance. She is well-developed.   HENT:      Head: Normocephalic and atraumatic.   Eyes:      Conjunctiva/sclera: Conjunctivae normal.   Pulmonary:      Effort: Pulmonary effort is normal.   Genitourinary:     Labia:         Right: Lesion (right bartholin abscess) present.           Comments: Tender swollen 2 cm diameter bartholin gland abscess  Musculoskeletal:      Right lower leg: No edema.      Left lower leg: No edema.   Skin:     General: Skin is warm and dry.   Neurological:      General: No focal deficit present.      Mental Status: She is alert and oriented to person, place, and time.     Bartholin Abscess incised and drained: Significant Discharge of thick white purulent material, no residual fluctuance, slight residual serosanguinous discharge, gauze pads provided. No complications observed and patient experienced significant relief in pain follow I&D.     Assessment/Plan:  Kimberly Jo is a 62 y.o. female who presents today for :        " ICD-10-CM ICD-9-CM    1. Abscess of right Bartholin's gland  N75.1 616.3 amoxicillin-clavulanate 875-125mg (AUGMENTIN) 875-125 mg per tablet      Ambulatory referral/consult to Obstetrics / Gynecology        I&D of Bartholin abscess, no Word Catheters on site so will allow to drain and gauze pads provided.    Advised on Antibiotics-- Augmentin-- and cleaning the area with warm saopy water 1-2 times daily and patting dry with a clean towl.     Replace gauze/ pads frequently as needed.    Gyn referral provided to follow up next week.     There are no Patient Instructions on file for this visit.      Follow up for return as needed for new concerns.

## 2022-08-27 NOTE — TELEPHONE ENCOUNTER
Spoke with patient. Notified patient of doctor advice.  stated no swim or soaking in wet anything until she sees gyn first about it. Patient understood and agreed.

## 2022-08-27 NOTE — TELEPHONE ENCOUNTER
----- Message from Cornelius Garvey sent at 8/27/2022 11:08 AM CDT -----  .Type:  Needs Medical Advice    Who Called: patient   Would the patient rather a call back or a response via [facility]? Call back   Best Call Back Number: [phone]  Additional Information: pt would like to know if it is okay for his wife tomasa hernandez who was seen today if it is okay if she can swim , call back please

## 2022-08-29 ENCOUNTER — OFFICE VISIT (OUTPATIENT)
Dept: PSYCHIATRY | Facility: CLINIC | Age: 62
End: 2022-08-29
Payer: COMMERCIAL

## 2022-08-29 DIAGNOSIS — F31.61 BIPOLAR DISORDER, CURRENT EPISODE MIXED, MILD: Primary | ICD-10-CM

## 2022-08-29 DIAGNOSIS — F41.1 GAD (GENERALIZED ANXIETY DISORDER): ICD-10-CM

## 2022-08-29 DIAGNOSIS — Z63.9 FAMILY PROBLEMS: ICD-10-CM

## 2022-08-29 PROCEDURE — 3044F HG A1C LEVEL LT 7.0%: CPT | Mod: CPTII,S$GLB,, | Performed by: SOCIAL WORKER

## 2022-08-29 PROCEDURE — 90834 PR PSYCHOTHERAPY W/PATIENT, 45 MIN: ICD-10-PCS | Mod: S$GLB,,, | Performed by: SOCIAL WORKER

## 2022-08-29 PROCEDURE — 1159F PR MEDICATION LIST DOCUMENTED IN MEDICAL RECORD: ICD-10-PCS | Mod: CPTII,S$GLB,, | Performed by: SOCIAL WORKER

## 2022-08-29 PROCEDURE — 90834 PSYTX W PT 45 MINUTES: CPT | Mod: S$GLB,,, | Performed by: SOCIAL WORKER

## 2022-08-29 PROCEDURE — 99999 PR PBB SHADOW E&M-EST. PATIENT-LVL I: ICD-10-PCS | Mod: PBBFAC,,, | Performed by: SOCIAL WORKER

## 2022-08-29 PROCEDURE — 3044F PR MOST RECENT HEMOGLOBIN A1C LEVEL <7.0%: ICD-10-PCS | Mod: CPTII,S$GLB,, | Performed by: SOCIAL WORKER

## 2022-08-29 PROCEDURE — 1159F MED LIST DOCD IN RCRD: CPT | Mod: CPTII,S$GLB,, | Performed by: SOCIAL WORKER

## 2022-08-29 PROCEDURE — 99999 PR PBB SHADOW E&M-EST. PATIENT-LVL I: CPT | Mod: PBBFAC,,, | Performed by: SOCIAL WORKER

## 2022-08-29 SDOH — SOCIAL DETERMINANTS OF HEALTH (SDOH): PROBLEM RELATED TO PRIMARY SUPPORT GROUP, UNSPECIFIED: Z63.9

## 2022-09-02 ENCOUNTER — PATIENT OUTREACH (OUTPATIENT)
Dept: ADMINISTRATIVE | Facility: HOSPITAL | Age: 62
End: 2022-09-02
Payer: COMMERCIAL

## 2022-09-02 ENCOUNTER — OFFICE VISIT (OUTPATIENT)
Dept: OBSTETRICS AND GYNECOLOGY | Facility: CLINIC | Age: 62
End: 2022-09-02
Payer: COMMERCIAL

## 2022-09-02 VITALS
BODY MASS INDEX: 23.24 KG/M2 | WEIGHT: 131.19 LBS | DIASTOLIC BLOOD PRESSURE: 69 MMHG | SYSTOLIC BLOOD PRESSURE: 110 MMHG

## 2022-09-02 DIAGNOSIS — N75.0 INFECTED CYST OF BARTHOLIN'S GLAND DUCT: Primary | ICD-10-CM

## 2022-09-02 DIAGNOSIS — N75.1 ABSCESS OF RIGHT BARTHOLIN'S GLAND: ICD-10-CM

## 2022-09-02 PROCEDURE — 87070 CULTURE OTHR SPECIMN AEROBIC: CPT | Performed by: STUDENT IN AN ORGANIZED HEALTH CARE EDUCATION/TRAINING PROGRAM

## 2022-09-02 PROCEDURE — 3078F PR MOST RECENT DIASTOLIC BLOOD PRESSURE < 80 MM HG: ICD-10-PCS | Mod: CPTII,S$GLB,, | Performed by: STUDENT IN AN ORGANIZED HEALTH CARE EDUCATION/TRAINING PROGRAM

## 2022-09-02 PROCEDURE — 99999 PR PBB SHADOW E&M-EST. PATIENT-LVL III: CPT | Mod: PBBFAC,,, | Performed by: STUDENT IN AN ORGANIZED HEALTH CARE EDUCATION/TRAINING PROGRAM

## 2022-09-02 PROCEDURE — 88305 TISSUE EXAM BY PATHOLOGIST: ICD-10-PCS | Mod: 26,,, | Performed by: PATHOLOGY

## 2022-09-02 PROCEDURE — 3078F DIAST BP <80 MM HG: CPT | Mod: CPTII,S$GLB,, | Performed by: STUDENT IN AN ORGANIZED HEALTH CARE EDUCATION/TRAINING PROGRAM

## 2022-09-02 PROCEDURE — 3074F PR MOST RECENT SYSTOLIC BLOOD PRESSURE < 130 MM HG: ICD-10-PCS | Mod: CPTII,S$GLB,, | Performed by: STUDENT IN AN ORGANIZED HEALTH CARE EDUCATION/TRAINING PROGRAM

## 2022-09-02 PROCEDURE — 1159F PR MEDICATION LIST DOCUMENTED IN MEDICAL RECORD: ICD-10-PCS | Mod: CPTII,S$GLB,, | Performed by: STUDENT IN AN ORGANIZED HEALTH CARE EDUCATION/TRAINING PROGRAM

## 2022-09-02 PROCEDURE — 1159F MED LIST DOCD IN RCRD: CPT | Mod: CPTII,S$GLB,, | Performed by: STUDENT IN AN ORGANIZED HEALTH CARE EDUCATION/TRAINING PROGRAM

## 2022-09-02 PROCEDURE — 3044F HG A1C LEVEL LT 7.0%: CPT | Mod: CPTII,S$GLB,, | Performed by: STUDENT IN AN ORGANIZED HEALTH CARE EDUCATION/TRAINING PROGRAM

## 2022-09-02 PROCEDURE — 3074F SYST BP LT 130 MM HG: CPT | Mod: CPTII,S$GLB,, | Performed by: STUDENT IN AN ORGANIZED HEALTH CARE EDUCATION/TRAINING PROGRAM

## 2022-09-02 PROCEDURE — 99999 PR PBB SHADOW E&M-EST. PATIENT-LVL III: ICD-10-PCS | Mod: PBBFAC,,, | Performed by: STUDENT IN AN ORGANIZED HEALTH CARE EDUCATION/TRAINING PROGRAM

## 2022-09-02 PROCEDURE — 56420 INCISION AND DRAINAGE: ICD-10-PCS | Mod: S$GLB,,, | Performed by: STUDENT IN AN ORGANIZED HEALTH CARE EDUCATION/TRAINING PROGRAM

## 2022-09-02 PROCEDURE — 56420 I&D BARTHOLINS GLAND ABSCESS: CPT | Mod: S$GLB,,, | Performed by: STUDENT IN AN ORGANIZED HEALTH CARE EDUCATION/TRAINING PROGRAM

## 2022-09-02 PROCEDURE — 99214 OFFICE O/P EST MOD 30 MIN: CPT | Mod: 25,S$GLB,, | Performed by: STUDENT IN AN ORGANIZED HEALTH CARE EDUCATION/TRAINING PROGRAM

## 2022-09-02 PROCEDURE — 99214 PR OFFICE/OUTPT VISIT, EST, LEVL IV, 30-39 MIN: ICD-10-PCS | Mod: 25,S$GLB,, | Performed by: STUDENT IN AN ORGANIZED HEALTH CARE EDUCATION/TRAINING PROGRAM

## 2022-09-02 PROCEDURE — 3044F PR MOST RECENT HEMOGLOBIN A1C LEVEL <7.0%: ICD-10-PCS | Mod: CPTII,S$GLB,, | Performed by: STUDENT IN AN ORGANIZED HEALTH CARE EDUCATION/TRAINING PROGRAM

## 2022-09-02 PROCEDURE — 3008F BODY MASS INDEX DOCD: CPT | Mod: CPTII,S$GLB,, | Performed by: STUDENT IN AN ORGANIZED HEALTH CARE EDUCATION/TRAINING PROGRAM

## 2022-09-02 PROCEDURE — 3008F PR BODY MASS INDEX (BMI) DOCUMENTED: ICD-10-PCS | Mod: CPTII,S$GLB,, | Performed by: STUDENT IN AN ORGANIZED HEALTH CARE EDUCATION/TRAINING PROGRAM

## 2022-09-02 PROCEDURE — 88305 TISSUE EXAM BY PATHOLOGIST: CPT | Performed by: PATHOLOGY

## 2022-09-02 PROCEDURE — 88305 TISSUE EXAM BY PATHOLOGIST: CPT | Mod: 26,,, | Performed by: PATHOLOGY

## 2022-09-02 RX ORDER — AZITHROMYCIN 250 MG/1
TABLET, FILM COATED ORAL
COMMUNITY
Start: 2022-06-23 | End: 2022-10-11 | Stop reason: ALTCHOICE

## 2022-09-02 NOTE — PROCEDURES
"INCISION AND DRAINAGE    Date/Time: 9/2/2022 8:00 AM  Performed by: Alicia Beaver MD  Authorized by: Alicia Beaver MD     Time out: Immediately prior to procedure a "time out" was called to verify the correct patient, procedure, equipment, support staff and site/side marked as required.    Consent Done?:  Yes (Written)    Type:  Abscess  Body area:  Anogenital (bathrolin's gland)  Location details:  Bartholin's gland  Anesthesia:  Local infiltration  Local anesthetic: Lidocaine 1% without epinephrine  Anesthetic total (ml):  3  Scalpel size:  11  Incision type:  Single straight  Incision depth: subcutaneous    Complexity:  Simple  Drainage:  Pus  Drainage amount:  Moderate  Wound treatment:  Wound left open, expression of material, incision and drainage  Packing material:  1/4 in gauze  Patient tolerance:  Patient tolerated the procedure well with no immediate complications  Biopsy (Gynecological)    Date/Time: 9/2/2022 8:00 AM  Performed by: Alicia Beaver MD  Authorized by: Alicia Beaver MD     Consent Done?:  Yes (Written)   Patient was prepped and draped in the normal sterile fashion.  Local anesthesia used?: Yes    Anesthesia:  Local infiltration  Local anesthetic:  Lidocaine 1% without epinephrine  Anesthetic total (ml):  3    Biopsy Location:  Vulva  Vulva:     # of lesions:  1  Estimated blood loss (cc):  2   Patient tolerated the procedure well with no immediate complications.  "

## 2022-09-02 NOTE — PROGRESS NOTES
CC: Gynecologic Exam    HPI:  Kimberly Jo is a 62 y.o. female  presents with complaint of right bartholin's gland cyst. Patient felt swelling and pain in right labia/vagina over two weeks ago, was concerned about cancer. Went to see family med physician who I&D the bartholin's gland, copious return of pus fluid. Patient started on augmentin and still currently taking. Pain significantly improved after drainage but swelling still persisted. Patient reports no fevers at home, no significant discomfort or drainage from vagina since that time.     ROS:  GENERAL: No fever, chills, fatigability or weight loss.  VULVAR: No pain, no itching. Minimal to no drainage, still feels deep lump on right side.   VAGINAL: No relaxation, no itching, no discharge, no abnormal bleeding and no lesions.  ABDOMEN: No abdominal pain. Denies nausea. Denies vomiting. No diarrhea. No constipation  BREAST: Denies pain. No lumps. No discharge.  URINARY: No incontinence, no nocturia, no frequency and no dysuria.  CARDIOVASCULAR: No chest pain. No shortness of breath. No leg cramps.  NEUROLOGICAL: No headaches. No vision changes.      Patient History:  Past Medical History:   Diagnosis Date    Acute appendicitis with localized peritonitis, without perforation, abscess, or gangrene 2020    Death of family member 2021    Depression     Dr Lourdes Mack & therapist Jaziel    Grief 10/28/2021    History of herpes zoster 2017    tx GYN    Interpersonal problem 2016    Mixed hyperlipidemia 2016    Moderate episode of recurrent major depressive disorder 2016    Slow transit constipation 2016    CS 50 yo    SVT (supraventricular tachycardia)     2015 ablated with Adenosine EMT (too much caffeine), cardiologist rec bblocker if it recurrs    Vitamin D deficiency     2015 OTC suppl    Word finding difficulty 07/10/2019    MRI nml , refer to Neurol 2019     Past Surgical History:   Procedure Laterality Date     "APPENDECTOMY  2020    Procedure: APPENDECTOMY;  Surgeon: David Eldridge MD;  Location: [facility] OR [address] [place] [address];  Service: General;;    LAPAROSCOPIC APPENDECTOMY N/A 2020    Procedure: APPENDECTOMY, LAPAROSCOPIC CONVERTED TO OPEN;  Surgeon: David Eldridge MD;  Location: [facility] OR [address] [place] [address];  Service: General;  Laterality: N/A;     Social History     Tobacco Use    Smoking status: Never    Smokeless tobacco: Never   Substance Use Topics    Alcohol use: No     Alcohol/week: 2.5 standard drinks     Types: 3 Standard drinks or equivalent per week    Drug use: No     Family History   Problem Relation Age of Onset    Melanoma Father     Cancer Father     Heart disease Brother 28         "blood clot" after injury hit head    Hypertension Mother     Alzheimer's disease Mother     Breast cancer Neg Hx     Colon cancer Neg Hx     Ovarian cancer Neg Hx      OB History    Para Term  AB Living   5 4 2 0 1 2   SAB IAB Ectopic Multiple Live Births   1 0 0 0        # Outcome Date GA Lbr Darrell/2nd Weight Sex Delivery Anes PTL Lv   5 Term            4 Term            3 SAB            2 Para      Vag-Spont      1 Para      Vag-Spont          Objective:   /69   Wt 59.5 kg (131 lb 2.8 oz)   BMI 23.24 kg/m²   No LMP recorded. Patient is postmenopausal.      PHYSICAL EXAM:  APPEARANCE: Well nourished, well developed, in no acute distress.  AFFECT: WNL, alert and oriented x 3  SKIN: No acne or hirsutism  NECK: Neck symmetric without masses or thyromegaly  NODES: No inguinal, cervical, axillary, or femoral lymph node enlargement  CHEST: Good respiratory effect  ABDOMEN: Soft.  No tenderness or masses.  No hepatosplenomegaly.  No hernias.  BREASTS: no completed  PELVIC: Normal external genitalia without lesions.  Normal hair distribution.  Adequate perineal body, normal urethral meatus.  Right bartholin's gland 2x2cm, mobile, non-tender. Small healed pinpoint of previous I&D noted above gland cyst.   EXTREMITIES: " No edema.      ASSESSMENT and PLAN:    ICD-10-CM ICD-9-CM    1. Infected cyst of Bartholin's gland duct  N75.0 616.2 Genital Culture      Specimen to Pathology, Ob/Gyn      INCISION AND DRAINAGE      Biopsy (Gynecological)          Right bartholin gland cyst/abscess   - I&D with PCP 8/27, completed Augmentin course   - exam today with persistent cyst, discussed repeat I&D with word catheter placement versus simple I&D. Reviewed recommendation for biopsy of cyst with patient as she is postmenopausal. Patient verbalized understanding and all questions answered. Shared decision making today to complete I&D and biopsy without placement of word catheter.   - I&D with pus noted, cultured and sent to pathology. Drained cyst completely and no further cyst palpated on exam, incision 5mm left open and hemostasis of edges with sliver nitrate. See procedure note. Will notify patient if abx need to be altered.   - punch biopsy of cyst wall completed, sent to pathology.   - patient to follow up in 2 weeks for repeat exam and review results     Follow up: as needed if symptoms worsen or \in 1 year for well woman exam       Alicia Beaver MD  OBGYN [facility]

## 2022-09-06 LAB — BACTERIA GENITAL AEROBE CULT: NO GROWTH

## 2022-09-06 NOTE — PROGRESS NOTES
"  Individual Psychotherapy (PhD/LCSW)    8/29/2022    Site:  [facility]         Therapeutic Intervention: Met with patient.  Outpatient - Insight oriented psychotherapy 45 min - CPT code 67780, Outpatient - Behavior modifying psychotherapy 45 min - CPT code 84407, Outpatient - Supportive psychotherapy 45 min - CPT Code 85824 and Outpatient - Interactive psychotherapy 45 min - CPT code 11002    Chief complaint/reason for encounter: depression, mood swings, anxiety, behavior and interpersonal.     Interval history and content of current session: Pt was last seen 2-weeks ago. Pt continues to discuss her new role and adjustment with her grand daughters have finally been transitioned to her niece's home full time. Pt continues to report she is struggling with symptoms of depression and worries that this will progress and she is not able to "shut down" as she would normally do.  Pt reviewed a list of concerns with clinician on items she would like to inquire about with her grandchildren and her level of involvement. Pt reports her two grand-daughters (3 yr & 1.5yrs) have been officially transferred and adopted by her niece.  Pt reports to continue to follow Dr. Lourdes Mack for medication mgmt and reports to be keeping up with monthly appts with her treating psychiatrist.  Pt reports she continues to be very involved with her family.  Pt stated she and her  and continue to stay connected to her close friends.  Insight/Judgement: adequate. Denies any SI/HI, NO A/V/H.   Focus: self care- exploring her anger about her daughter and fears her daughter is going to interfere with either adoption.   Clinician recommended pt to check in sooner with her treating psychiatrist pt presents manic with pressured rapid speech and racing thoughts.     Treatment plan:  Target symptoms: manic sx's, dis tractability, lack of focus, anxiety, recurrent depression, mood disorder, confusion, adjustment, family stress such as: caring " for  and 19-month old babies that were born addicted to drugs,  is blind, sister-in-law dx'd with stage IV cancer-is her care taker and transports to all appts, legal issues with adoption and dealing with her daughter who is an addict.   Why chosen therapy is appropriate versus another modality: relevant to diagnosis, patient responds to this modality, evidence based practice  Outcome monitoring methods: self-report, observation, feedback from family  Therapeutic intervention type: insight oriented psychotherapy, behavior modifying psychotherapy, supportive psychotherapy, interactive psychotherapy    Risk parameters:  Patient reports no suicidal ideation  Patient reports no homicidal ideation  Patient reports no self-injurious behavior  Patient reports no violent behavior    Verbal deficits: None    Patient's response to intervention:  The patient's response to intervention is accepting.    Progress toward goals and other mental status changes:  The patient's progress toward goals is  progressing.  .    Diagnosis:     ICD-10-CM  PL            1. Bipolar disorder, current episode mixed, mild F31.61 296.61    2. HUMPHREY (generalized anxiety disorder) F41.1 300.02    3. Acute stress reaction F43.0 308.9    4. Death of family member Z63.4 V62.82    5. Sudden death R99 798.1           Plan:  individual psychotherapy and medication management by physician    Return to clinic: 2 weeks    Length of Service (minutes): 45

## 2022-09-09 ENCOUNTER — PATIENT MESSAGE (OUTPATIENT)
Dept: OBSTETRICS AND GYNECOLOGY | Facility: CLINIC | Age: 62
End: 2022-09-09
Payer: COMMERCIAL

## 2022-09-09 LAB
FINAL PATHOLOGIC DIAGNOSIS: NORMAL
GROSS: NORMAL
Lab: NORMAL

## 2022-09-14 ENCOUNTER — TELEPHONE (OUTPATIENT)
Dept: OBSTETRICS AND GYNECOLOGY | Facility: CLINIC | Age: 62
End: 2022-09-14
Payer: COMMERCIAL

## 2022-09-14 NOTE — TELEPHONE ENCOUNTER
Reached patient today 9/14/22 to discuss her questions and biopsy results.       ----- Message from Heath Rodrigez sent at 9/14/2022 10:02 AM CDT -----  Contact: pt  Type: Requesting to speak with nurse        Who Called: PT  Regarding: results for Biopsy  Would the patient rather a call back or a response via [facility]? Call back  Best Call Back Number: [phone]  Additional Information:

## 2022-09-26 ENCOUNTER — OFFICE VISIT (OUTPATIENT)
Dept: PSYCHIATRY | Facility: CLINIC | Age: 62
End: 2022-09-26
Payer: COMMERCIAL

## 2022-09-26 DIAGNOSIS — F31.61 BIPOLAR DISORDER, CURRENT EPISODE MIXED, MILD: Primary | ICD-10-CM

## 2022-09-26 DIAGNOSIS — F41.1 GAD (GENERALIZED ANXIETY DISORDER): ICD-10-CM

## 2022-09-26 DIAGNOSIS — Z63.9 FAMILY PROBLEMS: ICD-10-CM

## 2022-09-26 DIAGNOSIS — F43.0 ACUTE STRESS REACTION: ICD-10-CM

## 2022-09-26 PROCEDURE — 3044F PR MOST RECENT HEMOGLOBIN A1C LEVEL <7.0%: ICD-10-PCS | Mod: CPTII,S$GLB,, | Performed by: SOCIAL WORKER

## 2022-09-26 PROCEDURE — 1159F MED LIST DOCD IN RCRD: CPT | Mod: CPTII,S$GLB,, | Performed by: SOCIAL WORKER

## 2022-09-26 PROCEDURE — 1159F PR MEDICATION LIST DOCUMENTED IN MEDICAL RECORD: ICD-10-PCS | Mod: CPTII,S$GLB,, | Performed by: SOCIAL WORKER

## 2022-09-26 PROCEDURE — 90834 PSYTX W PT 45 MINUTES: CPT | Mod: S$GLB,,, | Performed by: SOCIAL WORKER

## 2022-09-26 PROCEDURE — 99999 PR PBB SHADOW E&M-EST. PATIENT-LVL I: ICD-10-PCS | Mod: PBBFAC,,, | Performed by: SOCIAL WORKER

## 2022-09-26 PROCEDURE — 90834 PR PSYCHOTHERAPY W/PATIENT, 45 MIN: ICD-10-PCS | Mod: S$GLB,,, | Performed by: SOCIAL WORKER

## 2022-09-26 PROCEDURE — 3044F HG A1C LEVEL LT 7.0%: CPT | Mod: CPTII,S$GLB,, | Performed by: SOCIAL WORKER

## 2022-09-26 PROCEDURE — 99999 PR PBB SHADOW E&M-EST. PATIENT-LVL I: CPT | Mod: PBBFAC,,, | Performed by: SOCIAL WORKER

## 2022-09-26 SDOH — SOCIAL DETERMINANTS OF HEALTH (SDOH): PROBLEM RELATED TO PRIMARY SUPPORT GROUP, UNSPECIFIED: Z63.9

## 2022-09-26 NOTE — PROGRESS NOTES
"  Individual Psychotherapy (PhD/LCSW)    9/26/2022    Site:  [facility]         Therapeutic Intervention: Met with patient.  Outpatient - Insight oriented psychotherapy 45 min - CPT code 30453, Outpatient - Behavior modifying psychotherapy 45 min - CPT code 63697, Outpatient - Supportive psychotherapy 45 min - CPT Code 53664 and Outpatient - Interactive psychotherapy 45 min - CPT code 02908    Chief complaint/reason for encounter: depression, mood swings, anxiety, behavior and interpersonal.     Interval history and content of current session: Pt was last seen 2-weeks ago. Pt continues to discuss her new role and adjustment with her grand daughters have finally been transitioned to her niece's home full time. Pt reports she has been trying to be there more now that her granddaughters have moved and she has more time to help her sister-in-law (Yesica) who has a terminal cancer. Pt continues to report she is struggling with symptoms of depression and worries that this will progress and she is not able to "shut down" as she would normally do.  Pt reviewed a list of concerns with clinician on items she would like to inquire about with her grandchildren and her level of involvement. Pt reports her two grand-daughters (3 yr & 1.5yrs) have been officially transferred and adopted by her niece.  Pt reports to continue to follow Dr. Lourdes Mack for medication mgmt and reports to be keeping up with monthly appts with her treating psychiatrist.  Pt reports she continues to be very involved with her family.  Pt stated she and her  and continue to stay connected to her close friends.  Insight/Judgement: adequate. Denies any SI/HI, NO A/V/H.   Focus: self care- exploring her anger about her daughter and fears her daughter is going to interfere with either adoption.   Clinician recommended pt to check in sooner with her treating psychiatrist pt presents manic with pressured rapid speech and racing thoughts. "     Treatment plan:  Target symptoms: manic sx's, dis tractability, lack of focus, anxiety, recurrent depression, mood disorder, confusion, adjustment, family stress such as: caring for  and 19-month old babies that were born addicted to drugs,  is blind, sister-in-law dx'd with stage IV cancer-is her care taker and transports to all appts, legal issues with adoption and dealing with her daughter who is an addict.   Why chosen therapy is appropriate versus another modality: relevant to diagnosis, patient responds to this modality, evidence based practice  Outcome monitoring methods: self-report, observation, feedback from family  Therapeutic intervention type: insight oriented psychotherapy, behavior modifying psychotherapy, supportive psychotherapy, interactive psychotherapy    Risk parameters:  Patient reports no suicidal ideation  Patient reports no homicidal ideation  Patient reports no self-injurious behavior  Patient reports no violent behavior    Verbal deficits: None    Patient's response to intervention:  The patient's response to intervention is accepting.    Progress toward goals and other mental status changes:  The patient's progress toward goals is  progressing.  .    Diagnosis:     ICD-10-CM  PL            1. Bipolar disorder, current episode mixed, mild F31.61 296.61    2. HUMPHREY (generalized anxiety disorder) F41.1 300.02    3. Acute stress reaction F43.0 308.9    4. Death of family member Z63.4 V62.82    5. Sudden death R99 798.1           Plan:  individual psychotherapy and medication management by physician    Return to clinic: 2 weeks    Length of Service (minutes): 45

## 2022-10-11 ENCOUNTER — OFFICE VISIT (OUTPATIENT)
Dept: PRIMARY CARE CLINIC | Facility: CLINIC | Age: 62
End: 2022-10-11
Payer: COMMERCIAL

## 2022-10-11 VITALS
TEMPERATURE: 99 F | HEART RATE: 70 BPM | BODY MASS INDEX: 23.28 KG/M2 | DIASTOLIC BLOOD PRESSURE: 70 MMHG | SYSTOLIC BLOOD PRESSURE: 102 MMHG | HEIGHT: 63 IN | WEIGHT: 131.38 LBS

## 2022-10-11 DIAGNOSIS — M54.50 ACUTE RIGHT-SIDED LOW BACK PAIN WITHOUT SCIATICA: Primary | ICD-10-CM

## 2022-10-11 PROCEDURE — 3078F DIAST BP <80 MM HG: CPT | Mod: CPTII,S$GLB,, | Performed by: STUDENT IN AN ORGANIZED HEALTH CARE EDUCATION/TRAINING PROGRAM

## 2022-10-11 PROCEDURE — 1159F PR MEDICATION LIST DOCUMENTED IN MEDICAL RECORD: ICD-10-PCS | Mod: CPTII,S$GLB,, | Performed by: STUDENT IN AN ORGANIZED HEALTH CARE EDUCATION/TRAINING PROGRAM

## 2022-10-11 PROCEDURE — 3008F BODY MASS INDEX DOCD: CPT | Mod: CPTII,S$GLB,, | Performed by: STUDENT IN AN ORGANIZED HEALTH CARE EDUCATION/TRAINING PROGRAM

## 2022-10-11 PROCEDURE — 1159F MED LIST DOCD IN RCRD: CPT | Mod: CPTII,S$GLB,, | Performed by: STUDENT IN AN ORGANIZED HEALTH CARE EDUCATION/TRAINING PROGRAM

## 2022-10-11 PROCEDURE — 99212 OFFICE O/P EST SF 10 MIN: CPT | Mod: S$GLB,,, | Performed by: STUDENT IN AN ORGANIZED HEALTH CARE EDUCATION/TRAINING PROGRAM

## 2022-10-11 PROCEDURE — 99212 PR OFFICE/OUTPT VISIT, EST, LEVL II, 10-19 MIN: ICD-10-PCS | Mod: S$GLB,,, | Performed by: STUDENT IN AN ORGANIZED HEALTH CARE EDUCATION/TRAINING PROGRAM

## 2022-10-11 PROCEDURE — 1160F PR REVIEW ALL MEDS BY PRESCRIBER/CLIN PHARMACIST DOCUMENTED: ICD-10-PCS | Mod: CPTII,S$GLB,, | Performed by: STUDENT IN AN ORGANIZED HEALTH CARE EDUCATION/TRAINING PROGRAM

## 2022-10-11 PROCEDURE — 3008F PR BODY MASS INDEX (BMI) DOCUMENTED: ICD-10-PCS | Mod: CPTII,S$GLB,, | Performed by: STUDENT IN AN ORGANIZED HEALTH CARE EDUCATION/TRAINING PROGRAM

## 2022-10-11 PROCEDURE — 3044F HG A1C LEVEL LT 7.0%: CPT | Mod: CPTII,S$GLB,, | Performed by: STUDENT IN AN ORGANIZED HEALTH CARE EDUCATION/TRAINING PROGRAM

## 2022-10-11 PROCEDURE — 3074F PR MOST RECENT SYSTOLIC BLOOD PRESSURE < 130 MM HG: ICD-10-PCS | Mod: CPTII,S$GLB,, | Performed by: STUDENT IN AN ORGANIZED HEALTH CARE EDUCATION/TRAINING PROGRAM

## 2022-10-11 PROCEDURE — 3078F PR MOST RECENT DIASTOLIC BLOOD PRESSURE < 80 MM HG: ICD-10-PCS | Mod: CPTII,S$GLB,, | Performed by: STUDENT IN AN ORGANIZED HEALTH CARE EDUCATION/TRAINING PROGRAM

## 2022-10-11 PROCEDURE — 1160F RVW MEDS BY RX/DR IN RCRD: CPT | Mod: CPTII,S$GLB,, | Performed by: STUDENT IN AN ORGANIZED HEALTH CARE EDUCATION/TRAINING PROGRAM

## 2022-10-11 PROCEDURE — 3044F PR MOST RECENT HEMOGLOBIN A1C LEVEL <7.0%: ICD-10-PCS | Mod: CPTII,S$GLB,, | Performed by: STUDENT IN AN ORGANIZED HEALTH CARE EDUCATION/TRAINING PROGRAM

## 2022-10-11 PROCEDURE — 3074F SYST BP LT 130 MM HG: CPT | Mod: CPTII,S$GLB,, | Performed by: STUDENT IN AN ORGANIZED HEALTH CARE EDUCATION/TRAINING PROGRAM

## 2022-10-11 PROCEDURE — 99999 PR PBB SHADOW E&M-EST. PATIENT-LVL IV: CPT | Mod: PBBFAC,,, | Performed by: STUDENT IN AN ORGANIZED HEALTH CARE EDUCATION/TRAINING PROGRAM

## 2022-10-11 PROCEDURE — 99999 PR PBB SHADOW E&M-EST. PATIENT-LVL IV: ICD-10-PCS | Mod: PBBFAC,,, | Performed by: STUDENT IN AN ORGANIZED HEALTH CARE EDUCATION/TRAINING PROGRAM

## 2022-10-11 NOTE — PROGRESS NOTES
Primary Care  Return/Acute Office Visit - In Person  10/11/2022  Noe Ndhlovu      [facility]    Patient is a 62 y.o.   Kimberly Jo  has a past medical history of Acute appendicitis with localized peritonitis, without perforation, abscess, or gangrene (8/28/2020), Acute stress reaction (9/9/2021), Death of family member (9/9/2021), Depression, Grief (10/28/2021), History of herpes zoster (06/21/2017), Interpersonal problem (11/17/2016), Mixed hyperlipidemia (2/2/2016), Moderate episode of recurrent major depressive disorder (8/16/2016), Slow transit constipation (2/2/2016), SVT (supraventricular tachycardia), Vitamin D deficiency, and Word finding difficulty (07/10/2019).    Patient presents with   Chief Complaint   Patient presents with    Low-back Pain     Right region, on going a few months    Eye Problem     Bump on socket region, left eye    Stress       Lower right sided back pain   Intermittent pain, unsure of duration. Feels like bruising, no radiation. Has not noted any swelling or bruising in the area. No injuries.   Has not tried to take anything for the pain.     Lesion on left eye   Has possibly been ongoing for 3 months. Soreness above eyelid. Feels like raised lump. Patient denies any changes in vision. Denies any redness or discharge in eye. Denies any injuries       Social History     Social History Narrative    Homemaker, enjoys yoga,  Jimbo with glaucoma, 2 children, nonsmoker, ETOH socially, GYN here & [place], neg HPV 2018, repeat 2021,  normal colonoscopy 50 yo per pt with doctor on [place]     Kimberly Jo family history includes Alzheimer's disease in her mother; Cancer in her father; Heart disease (age of onset: 28) in her brother; Hypertension in her mother; Melanoma in her father.    Active Medications:  Review of patient's allergies indicates:   Allergen Reactions    Iodine      Other reaction(s): Vomiting    Sulfa (sulfonamide antibiotics)      Other reaction(s): Swelling  "    Current Outpatient Medications   Medication Instructions    biotin 50,000 mcg, Oral, Daily    cholecalciferol, vitamin D3, (VITAMIN D3) 50 mcg (2,000 unit) Tab Oral, Daily    citalopram (CELEXA) 20 mg, Oral, Daily    cyanocobalamin 5,000 mcg, Oral, Daily    diazePAM (VALIUM) 2 mg, Oral, 3 times daily PRN    gabapentin (NEURONTIN) 300 mg, Oral, 2 times daily    lamotrigine (LAMICTAL) 200 MG tablet 2 times daily    latanoprost 0.005 % ophthalmic solution INSTILL 1 DROP INTO EACH EYE AT BEDTIME    QUEtiapine (SEROQUEL XR) 400 mg, Oral, Nightly    rosuvastatin (CRESTOR) 10 MG tablet Take 1 tablet by mouth once daily    vit A/vit C/vit E/zinc/copper (PRESERVISION AREDS ORAL) Oral    zinc gluconate 50 mg, Oral, Daily       Review of Systems   Eyes:  Negative for pain, discharge and redness.   Musculoskeletal:  Positive for back pain.   All other systems reviewed and are negative.    Vitals:    10/11/22 0945   BP: 102/70   BP Location: Right arm   Pulse: 70   Temp: 98.9 °F (37.2 °C)   SpO2: Comment: cold fingers   Weight: 59.6 kg (131 lb 6.3 oz)   Height: 5' 3" (1.6 m)       Physical Exam  Vitals reviewed.   Constitutional:       General: She is not in acute distress.  Eyes:      General: Lids are normal.      Extraocular Movements: Extraocular movements intact.      Conjunctiva/sclera: Conjunctivae normal.   Cardiovascular:      Rate and Rhythm: Normal rate and regular rhythm.   Musculoskeletal:      Lumbar back: Normal. No swelling, deformity, tenderness or bony tenderness. Negative right straight leg raise test and negative left straight leg raise test.   Psychiatric:         Attention and Perception: Attention normal.         Mood and Affect: Mood is anxious.        Assessment and Plan     Lumbar back pain   Likely muscular pain. Instructed patient to use heat/ice and take ibuprofen     Left eye lesion   No abnormalities noted on physical exam   Instructed to use warm compress         Orders Placed This Visit  No " orders of the defined types were placed in this encounter.        Upcoming Scheduled Appointments and Follow Up:    Future Appointments   Date Time Provider Department Center   10/24/2022 10:30 AM Jody Sheriff PhD [facility] [address]   11/14/2022 10:30 AM Jody Sheriff PhD [facility] [address]   12/5/2022 11:30 AM Jody Sheriff PhD [facility] [address]   12/19/2022 10:30 AM Jody Sheriff PhD [facility] [address]   1/9/2023 11:30 AM Jody Sheriff PhD [facility] [address]   1/30/2023 11:30 AM Jody Sheriff PhD [facility] [address]   2/13/2023 11:30 AM Jody Sheriff PhD [facility] [address]       Follow Up [facility] (with who? when?): No follow-ups on file.      Extended Emergency Contact Information  Primary Emergency Contact: Jimbo Najera  Address: [address] [place] [id number] [facility]  Home Phone: [phone]  Mobile Phone: [phone]  Relation: Spouse      Noe Wheatley MD   Attending Physician  Primary Care  10/11/2022 - 10:05 AM    I spent a total of 25 minutes on the day of the visit.This includes face to face time and non-face to face time preparing to see the patient (eg, review of tests), obtaining and/or reviewing separately obtained history, documenting clinical information in the electronic or other health record, independently interpreting results and communicating results to the patient/family/caregiver, or care coordinator.

## 2022-10-24 ENCOUNTER — OFFICE VISIT (OUTPATIENT)
Dept: PSYCHIATRY | Facility: CLINIC | Age: 62
End: 2022-10-24
Payer: COMMERCIAL

## 2022-10-24 DIAGNOSIS — Z63.9 FAMILY PROBLEMS: ICD-10-CM

## 2022-10-24 DIAGNOSIS — F31.61 BIPOLAR DISORDER, CURRENT EPISODE MIXED, MILD: Primary | ICD-10-CM

## 2022-10-24 DIAGNOSIS — F41.1 GAD (GENERALIZED ANXIETY DISORDER): ICD-10-CM

## 2022-10-24 PROCEDURE — 3044F PR MOST RECENT HEMOGLOBIN A1C LEVEL <7.0%: ICD-10-PCS | Mod: CPTII,S$GLB,, | Performed by: SOCIAL WORKER

## 2022-10-24 PROCEDURE — 1159F MED LIST DOCD IN RCRD: CPT | Mod: CPTII,S$GLB,, | Performed by: SOCIAL WORKER

## 2022-10-24 PROCEDURE — 1159F PR MEDICATION LIST DOCUMENTED IN MEDICAL RECORD: ICD-10-PCS | Mod: CPTII,S$GLB,, | Performed by: SOCIAL WORKER

## 2022-10-24 PROCEDURE — 99999 PR PBB SHADOW E&M-EST. PATIENT-LVL II: ICD-10-PCS | Mod: PBBFAC,,, | Performed by: SOCIAL WORKER

## 2022-10-24 PROCEDURE — 90834 PSYTX W PT 45 MINUTES: CPT | Mod: S$GLB,,, | Performed by: SOCIAL WORKER

## 2022-10-24 PROCEDURE — 99999 PR PBB SHADOW E&M-EST. PATIENT-LVL II: CPT | Mod: PBBFAC,,, | Performed by: SOCIAL WORKER

## 2022-10-24 PROCEDURE — 90834 PR PSYCHOTHERAPY W/PATIENT, 45 MIN: ICD-10-PCS | Mod: S$GLB,,, | Performed by: SOCIAL WORKER

## 2022-10-24 PROCEDURE — 3044F HG A1C LEVEL LT 7.0%: CPT | Mod: CPTII,S$GLB,, | Performed by: SOCIAL WORKER

## 2022-10-24 SDOH — SOCIAL DETERMINANTS OF HEALTH (SDOH): PROBLEM RELATED TO PRIMARY SUPPORT GROUP, UNSPECIFIED: Z63.9

## 2022-11-11 NOTE — PROGRESS NOTES
"  Individual Psychotherapy (PhD/LCSW)    10/24/2022    Site:  [facility]         Therapeutic Intervention: Met with patient.  Outpatient - Insight oriented psychotherapy 45 min - CPT code 18320, Outpatient - Behavior modifying psychotherapy 45 min - CPT code 86643, Outpatient - Supportive psychotherapy 45 min - CPT Code 16502 and Outpatient - Interactive psychotherapy 45 min - CPT code 07337    Chief complaint/reason for encounter: depression, mood swings, anxiety, behavior and interpersonal.     Interval history and content of current session: Pt was last seen 2-weeks ago. Pt continues to discuss her new role and adjustment with her grand daughters have finally been transitioned to her niece's home full time. Pt reports she has been trying to be there more now that her granddaughters have moved and she has more time to help her sister-in-law (Yesica) who has a terminal cancer. Pt continues to report she is struggling with symptoms of depression and worries that this will progress and she is not able to "shut down" as she would normally do.  Pt reviewed a list of concerns with clinician on items she would like to inquire about with her grandchildren and her level of involvement. Pt reports her two grand-daughters (3 yr & 1.5yrs) have been officially transferred and adopted by her niece.  Pt reports to continue to follow Dr. Lourdes Mack for medication mgmt and reports to be keeping up with monthly appts with her treating psychiatrist.  Pt reports she continues to be very involved with her family.  Pt stated she and her  and continue to stay connected to her close friends.  Insight/Judgement: adequate. Denies any SI/HI, NO A/V/H.   Focus: self care- exploring her anger about her daughter and fears her daughter is going to interfere with either adoption.   Clinician recommended pt to check in sooner with her treating psychiatrist pt presents manic with pressured rapid speech and racing thoughts. "     Treatment plan:  Target symptoms: manic sx's, dis tractability, lack of focus, anxiety, recurrent depression, mood disorder, confusion, adjustment, family stress such as: caring for  and 19-month old babies that were born addicted to drugs,  is blind, sister-in-law dx'd with stage IV cancer-is her care taker and transports to all appts, legal issues with adoption and dealing with her daughter who is an addict.   Why chosen therapy is appropriate versus another modality: relevant to diagnosis, patient responds to this modality, evidence based practice  Outcome monitoring methods: self-report, observation, feedback from family  Therapeutic intervention type: insight oriented psychotherapy, behavior modifying psychotherapy, supportive psychotherapy, interactive psychotherapy    Risk parameters:  Patient reports no suicidal ideation  Patient reports no homicidal ideation  Patient reports no self-injurious behavior  Patient reports no violent behavior    Verbal deficits: None    Patient's response to intervention:  The patient's response to intervention is accepting.    Progress toward goals and other mental status changes:  The patient's progress toward goals is  progressing.  .    Diagnosis:     ICD-10-CM  PL            1. Bipolar disorder, current episode mixed, mild F31.61 296.61    2. HUMPHREY (generalized anxiety disorder) F41.1 300.02    3. Acute stress reaction F43.0 308.9    4. Death of family member Z63.4 V62.82    5. Sudden death R99 798.1           Plan:  individual psychotherapy and medication management by physician    Return to clinic: 2 weeks    Length of Service (minutes): 45

## 2022-11-14 ENCOUNTER — OFFICE VISIT (OUTPATIENT)
Dept: PSYCHIATRY | Facility: CLINIC | Age: 62
End: 2022-11-14
Payer: COMMERCIAL

## 2022-11-14 DIAGNOSIS — F31.0 BIPOLAR AFFECTIVE DISORDER, CURRENT EPISODE HYPOMANIC: Primary | ICD-10-CM

## 2022-11-14 DIAGNOSIS — F41.1 GAD (GENERALIZED ANXIETY DISORDER): ICD-10-CM

## 2022-11-14 DIAGNOSIS — Z63.9 FAMILY PROBLEMS: ICD-10-CM

## 2022-11-14 DIAGNOSIS — F32.A DEPRESSION, UNSPECIFIED DEPRESSION TYPE: ICD-10-CM

## 2022-11-14 PROCEDURE — 90834 PSYTX W PT 45 MINUTES: CPT | Mod: S$GLB,,, | Performed by: SOCIAL WORKER

## 2022-11-14 PROCEDURE — 3044F PR MOST RECENT HEMOGLOBIN A1C LEVEL <7.0%: ICD-10-PCS | Mod: CPTII,S$GLB,, | Performed by: SOCIAL WORKER

## 2022-11-14 PROCEDURE — 3044F HG A1C LEVEL LT 7.0%: CPT | Mod: CPTII,S$GLB,, | Performed by: SOCIAL WORKER

## 2022-11-14 PROCEDURE — 90834 PR PSYCHOTHERAPY W/PATIENT, 45 MIN: ICD-10-PCS | Mod: S$GLB,,, | Performed by: SOCIAL WORKER

## 2022-11-14 SDOH — SOCIAL DETERMINANTS OF HEALTH (SDOH): PROBLEM RELATED TO PRIMARY SUPPORT GROUP, UNSPECIFIED: Z63.9

## 2022-11-29 ENCOUNTER — OFFICE VISIT (OUTPATIENT)
Dept: INTERNAL MEDICINE | Facility: CLINIC | Age: 62
End: 2022-11-29
Payer: COMMERCIAL

## 2022-11-29 VITALS
DIASTOLIC BLOOD PRESSURE: 66 MMHG | HEART RATE: 98 BPM | OXYGEN SATURATION: 97 % | HEIGHT: 63 IN | TEMPERATURE: 98 F | RESPIRATION RATE: 18 BRPM | WEIGHT: 131.63 LBS | SYSTOLIC BLOOD PRESSURE: 98 MMHG | BODY MASS INDEX: 23.32 KG/M2

## 2022-11-29 DIAGNOSIS — G89.29 CHRONIC RIGHT-SIDED LOW BACK PAIN WITHOUT SCIATICA: ICD-10-CM

## 2022-11-29 DIAGNOSIS — M54.50 CHRONIC RIGHT-SIDED LOW BACK PAIN WITHOUT SCIATICA: ICD-10-CM

## 2022-11-29 DIAGNOSIS — I95.9 HYPOTENSION, UNSPECIFIED HYPOTENSION TYPE: Primary | ICD-10-CM

## 2022-11-29 PROCEDURE — 99999 PR PBB SHADOW E&M-EST. PATIENT-LVL IV: CPT | Mod: PBBFAC,,, | Performed by: STUDENT IN AN ORGANIZED HEALTH CARE EDUCATION/TRAINING PROGRAM

## 2022-11-29 PROCEDURE — 99214 PR OFFICE/OUTPT VISIT, EST, LEVL IV, 30-39 MIN: ICD-10-PCS | Mod: S$GLB,,, | Performed by: STUDENT IN AN ORGANIZED HEALTH CARE EDUCATION/TRAINING PROGRAM

## 2022-11-29 PROCEDURE — 3078F PR MOST RECENT DIASTOLIC BLOOD PRESSURE < 80 MM HG: ICD-10-PCS | Mod: CPTII,S$GLB,, | Performed by: STUDENT IN AN ORGANIZED HEALTH CARE EDUCATION/TRAINING PROGRAM

## 2022-11-29 PROCEDURE — 3074F SYST BP LT 130 MM HG: CPT | Mod: CPTII,S$GLB,, | Performed by: STUDENT IN AN ORGANIZED HEALTH CARE EDUCATION/TRAINING PROGRAM

## 2022-11-29 PROCEDURE — 3044F PR MOST RECENT HEMOGLOBIN A1C LEVEL <7.0%: ICD-10-PCS | Mod: CPTII,S$GLB,, | Performed by: STUDENT IN AN ORGANIZED HEALTH CARE EDUCATION/TRAINING PROGRAM

## 2022-11-29 PROCEDURE — 3008F BODY MASS INDEX DOCD: CPT | Mod: CPTII,S$GLB,, | Performed by: STUDENT IN AN ORGANIZED HEALTH CARE EDUCATION/TRAINING PROGRAM

## 2022-11-29 PROCEDURE — 3008F PR BODY MASS INDEX (BMI) DOCUMENTED: ICD-10-PCS | Mod: CPTII,S$GLB,, | Performed by: STUDENT IN AN ORGANIZED HEALTH CARE EDUCATION/TRAINING PROGRAM

## 2022-11-29 PROCEDURE — 1159F MED LIST DOCD IN RCRD: CPT | Mod: CPTII,S$GLB,, | Performed by: STUDENT IN AN ORGANIZED HEALTH CARE EDUCATION/TRAINING PROGRAM

## 2022-11-29 PROCEDURE — 99999 PR PBB SHADOW E&M-EST. PATIENT-LVL IV: ICD-10-PCS | Mod: PBBFAC,,, | Performed by: STUDENT IN AN ORGANIZED HEALTH CARE EDUCATION/TRAINING PROGRAM

## 2022-11-29 PROCEDURE — 3074F PR MOST RECENT SYSTOLIC BLOOD PRESSURE < 130 MM HG: ICD-10-PCS | Mod: CPTII,S$GLB,, | Performed by: STUDENT IN AN ORGANIZED HEALTH CARE EDUCATION/TRAINING PROGRAM

## 2022-11-29 PROCEDURE — 3044F HG A1C LEVEL LT 7.0%: CPT | Mod: CPTII,S$GLB,, | Performed by: STUDENT IN AN ORGANIZED HEALTH CARE EDUCATION/TRAINING PROGRAM

## 2022-11-29 PROCEDURE — 99214 OFFICE O/P EST MOD 30 MIN: CPT | Mod: S$GLB,,, | Performed by: STUDENT IN AN ORGANIZED HEALTH CARE EDUCATION/TRAINING PROGRAM

## 2022-11-29 PROCEDURE — 1159F PR MEDICATION LIST DOCUMENTED IN MEDICAL RECORD: ICD-10-PCS | Mod: CPTII,S$GLB,, | Performed by: STUDENT IN AN ORGANIZED HEALTH CARE EDUCATION/TRAINING PROGRAM

## 2022-11-29 PROCEDURE — 3078F DIAST BP <80 MM HG: CPT | Mod: CPTII,S$GLB,, | Performed by: STUDENT IN AN ORGANIZED HEALTH CARE EDUCATION/TRAINING PROGRAM

## 2022-11-29 RX ORDER — QUETIAPINE 300 MG/1
300 TABLET, FILM COATED, EXTENDED RELEASE ORAL NIGHTLY
COMMUNITY
Start: 2022-11-28

## 2022-11-29 NOTE — PROGRESS NOTES
Subjective:      Chief Complaint: Hypotension    HPI  Ms. Jo is a 62-year-old woman with bipolar/general anxiety disorder, hyperlipidemia, vitamin-D deficiency, and slow transit chronic constipation presenting for evaluation of back pain; of note this was evaluated in March of this year with reassuring x-rays and offer for physical therapy.      Hypotension:   - patient is very concerned for subjectively extremely low blood pressures  - 98/66 on rooming asymptomatically  - no prior episodes of lightheadedness or presyncope reported   - denies chest pain, shortness breast, or lower extremity edema   - her psychiatric medications are currently being titrated in an effort to improve blood pressure as there suspected to be causing suppression    Back pain:   - similar presentation to aforementioned as above   - disinterested in physical therapy    Past Medical History:   Diagnosis Date    Acute appendicitis with localized peritonitis, without perforation, abscess, or gangrene 8/28/2020    Acute stress reaction 9/9/2021    Death of family member 9/9/2021    Depression     Dr Lourdes Mack & therapist Jaziel    Grief 10/28/2021    History of herpes zoster 06/21/2017    tx GYN    Interpersonal problem 11/17/2016    Mixed hyperlipidemia 2/2/2016    Moderate episode of recurrent major depressive disorder 8/16/2016    Slow transit constipation 2/2/2016    CS 50 yo    SVT (supraventricular tachycardia)     2015 ablated with Adenosine EMT (too much caffeine), cardiologist rec bblocker if it recurrs    Vitamin D deficiency     2015 OTC suppl    Word finding difficulty 07/10/2019    MRI nml 2019, refer to Neurol 7/2019     Past Surgical History:   Procedure Laterality Date    APPENDECTOMY  8/28/2020    Procedure: APPENDECTOMY;  Surgeon: David Eldridge MD;  Location: [facility] OR [address] [place] [address];  Service: General;;    LAPAROSCOPIC APPENDECTOMY N/A 8/28/2020    Procedure: APPENDECTOMY, LAPAROSCOPIC CONVERTED TO OPEN;  Surgeon: David  "SLOAN Eldridge MD;  Location: [facility] OR [address] [place] [address];  Service: General;  Laterality: N/A;     Family History   Problem Relation Age of Onset    Melanoma Father     Cancer Father     Heart disease Brother 28         "blood clot" after injury hit head    Hypertension Mother     Alzheimer's disease Mother     Breast cancer Neg Hx     Colon cancer Neg Hx     Ovarian cancer Neg Hx      Social History     Socioeconomic History    Marital status:    Tobacco Use    Smoking status: Never    Smokeless tobacco: Never   Substance and Sexual Activity    Alcohol use: No     Alcohol/week: 2.5 standard drinks     Types: 3 Standard drinks or equivalent per week    Drug use: No    Sexual activity: Yes     Partners: Male     Birth control/protection: Post-menopausal     Comment:    Social History Narrative    Homemaker, enjoys yoga,  Jimbo with glaucoma, 2 children, nonsmoker, ETOH socially, GYN here & [place], neg HPV 2018, repeat 2021,  normal colonoscopy 50 yo per pt with doctor on [place]     Review of patient's allergies indicates:   Allergen Reactions    Iodine      Other reaction(s): Vomiting    Sulfa (sulfonamide antibiotics)      Other reaction(s): Swelling     Kimberly SOLIMANSalvador Jo had no medications administered during this visit.    Review of Systems   Constitutional:  Negative for appetite change, chills and fever.   HENT: Negative.     Respiratory:  Negative for cough, chest tightness and shortness of breath.    Cardiovascular:  Negative for chest pain, palpitations and leg swelling.   Gastrointestinal:  Negative for abdominal distention, abdominal pain, blood in stool, constipation, diarrhea, nausea and vomiting.   Endocrine: Negative.    Genitourinary:  Negative for difficulty urinating, dysuria, frequency and hematuria.   Musculoskeletal: Negative.    Integumentary:  Negative.   Neurological: Negative.    Psychiatric/Behavioral: Negative.         Objective:      Vitals:    11/29/22 1404   BP: 98/66   Pulse: 98 "   Resp: 18   Temp: 97.5 °F (36.4 °C)      Physical Exam  Vitals reviewed.   Constitutional:       General: She is not in acute distress.     Appearance: Normal appearance.   HENT:      Head: Normocephalic and atraumatic.      Comments: Facial features are symmetric      Nose: Nose normal. No congestion or rhinorrhea.      Mouth/Throat:      Mouth: Mucous membranes are moist.      Pharynx: Oropharynx is clear. No oropharyngeal exudate or posterior oropharyngeal erythema.   Eyes:      General: No scleral icterus.     Extraocular Movements: Extraocular movements intact.      Conjunctiva/sclera: Conjunctivae normal.   Cardiovascular:      Rate and Rhythm: Normal rate and regular rhythm.      Pulses: Normal pulses.      Heart sounds: Normal heart sounds.   Pulmonary:      Effort: Pulmonary effort is normal. No respiratory distress.      Breath sounds: Normal breath sounds.   Musculoskeletal:         General: No deformity or signs of injury. Normal range of motion.      Cervical back: Normal range of motion.      Comments: Gait normal    Skin:     General: Skin is warm and dry.      Findings: No rash.   Neurological:      General: No focal deficit present.      Mental Status: She is alert and oriented to person, place, and time. Mental status is at baseline.   Psychiatric:         Mood and Affect: Mood normal.         Behavior: Behavior normal.         Thought Content: Thought content normal.     Current Outpatient Medications on File Prior to Visit   Medication Sig Dispense Refill    biotin 2,500 mcg Tab Take 50,000 mcg by mouth once daily.      cholecalciferol, vitamin D3, (VITAMIN D3) 50 mcg (2,000 unit) Tab Take by mouth once daily.      citalopram (CELEXA) 20 MG tablet Take 20 mg by mouth once daily.      clarithromycin (BIAXIN) 250 MG tablet Take 1 tablet (250 mg total) by mouth every 12 (twelve) hours. 10 tablet 0    cyanocobalamin 500 MCG tablet Take 5,000 mcg by mouth once daily.      diazePAM (VALIUM) 2 MG  tablet Take 2 mg by mouth 3 (three) times daily as needed.      gabapentin (NEURONTIN) 100 MG capsule Take 300 mg by mouth 2 (two) times daily.      lamotrigine (LAMICTAL) 200 MG tablet 2 (two) times daily.       latanoprost 0.005 % ophthalmic solution INSTILL 1 DROP INTO EACH EYE AT BEDTIME      QUEtiapine (SEROQUEL XR) 300 MG Tb24 Take 300 mg by mouth every evening.      rosuvastatin (CRESTOR) 10 MG tablet Take 1 tablet by mouth once daily 90 tablet 2    vit A/vit C/vit E/zinc/copper (PRESERVISION AREDS ORAL) Take by mouth.      zinc gluconate 50 mg tablet Take 50 mg by mouth once daily.      QUEtiapine (SEROQUEL XR) 400 MG Tb24 Take 400 mg by mouth nightly.       No current facility-administered medications on file prior to visit.         Assessment:       1. Hypotension, unspecified hypotension type    2. Chronic right-sided low back pain without sciatica        Plan:       Hypotension, unspecified hypotension type   - I agree that hypotensive episodes are most likely related to antipsychotics, but as she is entirely asymptomatic do not think further evaluation (in excess of CBC, CMP, TSH/T4 pending via psychiatry) is needed)     Chronic right-sided low back pain without sciatica    - Pt deferrs PT; can contact me if she changes her mind

## 2022-12-05 ENCOUNTER — OFFICE VISIT (OUTPATIENT)
Dept: PSYCHIATRY | Facility: CLINIC | Age: 62
End: 2022-12-05
Payer: COMMERCIAL

## 2022-12-05 DIAGNOSIS — F41.1 GAD (GENERALIZED ANXIETY DISORDER): ICD-10-CM

## 2022-12-05 DIAGNOSIS — F31.61 BIPOLAR DISORDER, CURRENT EPISODE MIXED, MILD: Primary | ICD-10-CM

## 2022-12-05 DIAGNOSIS — Z63.9 FAMILY PROBLEMS: ICD-10-CM

## 2022-12-05 PROCEDURE — 1159F PR MEDICATION LIST DOCUMENTED IN MEDICAL RECORD: ICD-10-PCS | Mod: CPTII,S$GLB,, | Performed by: SOCIAL WORKER

## 2022-12-05 PROCEDURE — 90834 PR PSYCHOTHERAPY W/PATIENT, 45 MIN: ICD-10-PCS | Mod: S$GLB,,, | Performed by: SOCIAL WORKER

## 2022-12-05 PROCEDURE — 3044F PR MOST RECENT HEMOGLOBIN A1C LEVEL <7.0%: ICD-10-PCS | Mod: CPTII,S$GLB,, | Performed by: SOCIAL WORKER

## 2022-12-05 PROCEDURE — 99999 PR PBB SHADOW E&M-EST. PATIENT-LVL I: CPT | Mod: PBBFAC,,, | Performed by: SOCIAL WORKER

## 2022-12-05 PROCEDURE — 3044F HG A1C LEVEL LT 7.0%: CPT | Mod: CPTII,S$GLB,, | Performed by: SOCIAL WORKER

## 2022-12-05 PROCEDURE — 99999 PR PBB SHADOW E&M-EST. PATIENT-LVL I: ICD-10-PCS | Mod: PBBFAC,,, | Performed by: SOCIAL WORKER

## 2022-12-05 PROCEDURE — 1159F MED LIST DOCD IN RCRD: CPT | Mod: CPTII,S$GLB,, | Performed by: SOCIAL WORKER

## 2022-12-05 PROCEDURE — 90834 PSYTX W PT 45 MINUTES: CPT | Mod: S$GLB,,, | Performed by: SOCIAL WORKER

## 2022-12-05 SDOH — SOCIAL DETERMINANTS OF HEALTH (SDOH): PROBLEM RELATED TO PRIMARY SUPPORT GROUP, UNSPECIFIED: Z63.9

## 2022-12-05 NOTE — PROGRESS NOTES
"  Individual Psychotherapy (PhD/LCSW)    12/5/2022    Site:  [facility]         Therapeutic Intervention: Met with patient.  Outpatient - Insight oriented psychotherapy 45 min - CPT code 65403, Outpatient - Behavior modifying psychotherapy 45 min - CPT code 09097, Outpatient - Supportive psychotherapy 45 min - CPT Code 47045 and Outpatient - Interactive psychotherapy 45 min - CPT code 90836    Chief complaint/reason for encounter: depression, mood swings, anxiety, behavior and interpersonal.     Interval history and content of current session: Pt was last seen 3-weeks ago. Pt continues to discuss her new role and adjustment with her grand daughters have finally been transitioned to her niece's home full time. Pt reports she is not doing well and has been struggling more with depression.  Pt reports she has felt very overwhelmed with the onset of the holidays. Pt presents tearful in the session.   Pt continues to report she is struggling with symptoms of depression and worries that this will progress and she is not able to "shut down" as she would normally do.  Pt reviewed a list of concerns with clinician on items she would like to inquire about with her grandchildren and her level of involvement. Pt reports her two grand-daughters (3 yr & 1.5yrs) have been officially transferred and adopted by her niece.  Pt reports to continue to follow Dr. Lourdes Mack for medication mgmt and reports to be keeping up with monthly appts with her treating psychiatrist.  Pt reports she continues to be very involved with her family.  Pt stated she and her  and continue to stay connected to her close friends.  Insight/Judgement: adequate. Denies any SI/HI, NO A/V/H.   Focus: self care- exploring her anger about her daughter and fears her daughter is going to interfere with either adoption.   Clinician recommended pt to check in sooner with her treating psychiatrist pt presents manic with pressured rapid speech and racing " thoughts.     Treatment plan:  Target symptoms: manic sx's, dis tractability, lack of focus, anxiety, recurrent depression, mood disorder, confusion, adjustment, family stress such as: caring for  and 19-month old babies that were born addicted to drugs,  is blind, sister-in-law dx'd with stage IV cancer-is her care taker and transports to all appts, legal issues with adoption and dealing with her daughter who is an addict.   Why chosen therapy is appropriate versus another modality: relevant to diagnosis, patient responds to this modality, evidence based practice  Outcome monitoring methods: self-report, observation, feedback from family  Therapeutic intervention type: insight oriented psychotherapy, behavior modifying psychotherapy, supportive psychotherapy, interactive psychotherapy    Risk parameters:  Patient reports no suicidal ideation  Patient reports no homicidal ideation  Patient reports no self-injurious behavior  Patient reports no violent behavior    Verbal deficits: None    Patient's response to intervention:  The patient's response to intervention is accepting.    Progress toward goals and other mental status changes:  The patient's progress toward goals is  progressing.  .    Diagnosis:     ICD-10-CM  PL            1. Bipolar disorder, current episode mixed, mild F31.61 296.61    2. HUMPHREY (generalized anxiety disorder) F41.1 300.02    3. Acute stress reaction F43.0 308.9    4. Death of family member Z63.4 V62.82    5. Sudden death R99 798.1           Plan:  individual psychotherapy and medication management by physician    Return to clinic: 2 weeks    Length of Service (minutes): 45

## 2023-01-09 ENCOUNTER — OFFICE VISIT (OUTPATIENT)
Dept: PSYCHIATRY | Facility: CLINIC | Age: 63
End: 2023-01-09
Payer: MEDICARE

## 2023-01-09 DIAGNOSIS — Z63.9 FAMILY PROBLEMS: ICD-10-CM

## 2023-01-09 DIAGNOSIS — F31.61 BIPOLAR DISORDER, CURRENT EPISODE MIXED, MILD: Primary | ICD-10-CM

## 2023-01-09 DIAGNOSIS — F41.1 GAD (GENERALIZED ANXIETY DISORDER): ICD-10-CM

## 2023-01-09 PROCEDURE — 99999 PR PBB SHADOW E&M-EST. PATIENT-LVL I: ICD-10-PCS | Mod: PBBFAC,,, | Performed by: SOCIAL WORKER

## 2023-01-09 PROCEDURE — 99999 PR PBB SHADOW E&M-EST. PATIENT-LVL I: CPT | Mod: PBBFAC,,, | Performed by: SOCIAL WORKER

## 2023-01-09 PROCEDURE — 1159F MED LIST DOCD IN RCRD: CPT | Mod: CPTII,S$GLB,, | Performed by: SOCIAL WORKER

## 2023-01-09 PROCEDURE — 90834 PR PSYCHOTHERAPY W/PATIENT, 45 MIN: ICD-10-PCS | Mod: S$GLB,,, | Performed by: SOCIAL WORKER

## 2023-01-09 PROCEDURE — 90834 PSYTX W PT 45 MINUTES: CPT | Mod: S$GLB,,, | Performed by: SOCIAL WORKER

## 2023-01-09 PROCEDURE — 1159F PR MEDICATION LIST DOCUMENTED IN MEDICAL RECORD: ICD-10-PCS | Mod: CPTII,S$GLB,, | Performed by: SOCIAL WORKER

## 2023-01-09 SDOH — SOCIAL DETERMINANTS OF HEALTH (SDOH): PROBLEM RELATED TO PRIMARY SUPPORT GROUP, UNSPECIFIED: Z63.9

## 2023-01-10 NOTE — PROGRESS NOTES
Individual Psychotherapy (PhD/LCSW)    2023    Site:  [facility]         Therapeutic Intervention: Met with patient.  Outpatient - Insight oriented psychotherapy 45 min - CPT code 57168, Outpatient - Behavior modifying psychotherapy 45 min - CPT code 33374, Outpatient - Supportive psychotherapy 45 min - CPT Code 25745 and Outpatient - Interactive psychotherapy 45 min - CPT code 58400    Chief complaint/reason for encounter: depression, mood swings, anxiety, behavior and interpersonal.     Interval history and content of current session: Pt was last seen 3-weeks ago. Pt continues to discuss her new role and adjustment with her grand daughters have finally been transitioned to her niece's home full time.  Pt reviewed a list of concerns with clinician on items she would like to inquire about with her grandchildren and her level of involvement. Pt reports her two grand-daughters (3 yr & 1.5yrs) have been officially transferred and adopted by her niece.  Pt reports to continue to follow Dr. Lourdes Mack for medication mgmt and reports to be keeping up with monthly appts with her treating psychiatrist.  Pt reports she continues to be very involved with her family.  Pt stated she and her  and continue to stay connected to her close friends.  Insight/Judgement: adequate. Denies any SI/HI, NO A/V/H.   Focus: self care- exploring her anger about her daughter and fears her daughter is going to interfere with either adoption.   Clinician recommended pt to check in sooner with her treating psychiatrist pt presents manic with pressured rapid speech and racing thoughts.     Treatment plan:  Target symptoms: manic sx's, dis tractability, lack of focus, anxiety, recurrent depression, mood disorder, confusion, adjustment, family stress such as: caring for  and 19-month old babies that were born addicted to drugs,  is blind, sister-in-law dx'd with stage IV cancer-is her care taker and transports to  all appts, legal issues with adoption and dealing with her daughter who is an addict.   Why chosen therapy is appropriate versus another modality: relevant to diagnosis, patient responds to this modality, evidence based practice  Outcome monitoring methods: self-report, observation, feedback from family  Therapeutic intervention type: insight oriented psychotherapy, behavior modifying psychotherapy, supportive psychotherapy, interactive psychotherapy    Risk parameters:  Patient reports no suicidal ideation  Patient reports no homicidal ideation  Patient reports no self-injurious behavior  Patient reports no violent behavior    Verbal deficits: None    Patient's response to intervention:  The patient's response to intervention is accepting.    Progress toward goals and other mental status changes:  The patient's progress toward goals is  progressing.  .    Diagnosis:     ICD-10-CM  PL            1. Bipolar disorder, current episode mixed, mild F31.61 296.61    2. HUMPHREY (generalized anxiety disorder) F41.1 300.02    3. Acute stress reaction F43.0 308.9    4. Death of family member Z63.4 V62.82    5. Sudden death R99 798.1           Plan:  individual psychotherapy and medication management by physician    Return to clinic: 2 weeks    Length of Service (minutes): 45

## 2023-01-12 ENCOUNTER — TELEPHONE (OUTPATIENT)
Dept: PRIMARY CARE CLINIC | Facility: CLINIC | Age: 63
End: 2023-01-12
Payer: MEDICARE

## 2023-01-12 DIAGNOSIS — E78.2 MIXED HYPERLIPIDEMIA: ICD-10-CM

## 2023-01-12 DIAGNOSIS — Z00.00 ROUTINE GENERAL MEDICAL EXAMINATION AT A HEALTH CARE FACILITY: Primary | ICD-10-CM

## 2023-01-12 NOTE — TELEPHONE ENCOUNTER
"----- Message from Angella Lindquits sent at 1/12/2023 10:56 AM CST -----  Contact: KATELYN TEIXEIRA [[id number]]@ [phone]  type: Lab    Caller is requesting to schedule their Lab appointment prior to annual appointment.  Order is not listed in EPIC.  Please enter order and contact patient to schedule.    Name of Caller: Self    Preferred Date and Time of Labs: 03/22 @  10:00    Date of Annual Physical Appointment: 03/29    Where would they like the lab performed?  [facility]    Would the patient rather a call back or a response via [facility]? Call back    Best Call Back Number:same    Additional Information: N/a    "Do not send messages requesting lab orders prior to Physical appt on these providers: [facility] Zafar Giambrone,  Terrance Albarran and Anthony."       "

## 2023-01-30 ENCOUNTER — OFFICE VISIT (OUTPATIENT)
Dept: PSYCHIATRY | Facility: CLINIC | Age: 63
End: 2023-01-30
Payer: MEDICARE

## 2023-01-30 DIAGNOSIS — F41.1 GAD (GENERALIZED ANXIETY DISORDER): ICD-10-CM

## 2023-01-30 DIAGNOSIS — F31.61 BIPOLAR DISORDER, CURRENT EPISODE MIXED, MILD: Primary | ICD-10-CM

## 2023-01-30 DIAGNOSIS — Z63.9 FAMILY PROBLEMS: ICD-10-CM

## 2023-01-30 PROCEDURE — 1159F MED LIST DOCD IN RCRD: CPT | Mod: CPTII,S$GLB,, | Performed by: SOCIAL WORKER

## 2023-01-30 PROCEDURE — 99999 PR PBB SHADOW E&M-EST. PATIENT-LVL I: ICD-10-PCS | Mod: PBBFAC,,, | Performed by: SOCIAL WORKER

## 2023-01-30 PROCEDURE — 99999 PR PBB SHADOW E&M-EST. PATIENT-LVL I: CPT | Mod: PBBFAC,,, | Performed by: SOCIAL WORKER

## 2023-01-30 PROCEDURE — 90834 PSYTX W PT 45 MINUTES: CPT | Mod: S$GLB,,, | Performed by: SOCIAL WORKER

## 2023-01-30 PROCEDURE — 90834 PR PSYCHOTHERAPY W/PATIENT, 45 MIN: ICD-10-PCS | Mod: S$GLB,,, | Performed by: SOCIAL WORKER

## 2023-01-30 PROCEDURE — 1159F PR MEDICATION LIST DOCUMENTED IN MEDICAL RECORD: ICD-10-PCS | Mod: CPTII,S$GLB,, | Performed by: SOCIAL WORKER

## 2023-01-30 SDOH — SOCIAL DETERMINANTS OF HEALTH (SDOH): PROBLEM RELATED TO PRIMARY SUPPORT GROUP, UNSPECIFIED: Z63.9

## 2023-01-31 NOTE — PROGRESS NOTES
Individual Psychotherapy (PhD/LCSW)    2023    Site:  [facility]         Therapeutic Intervention: Met with patient.  Outpatient - Insight oriented psychotherapy 45 min - CPT code 83643, Outpatient - Behavior modifying psychotherapy 45 min - CPT code 26663, Outpatient - Supportive psychotherapy 45 min - CPT Code 56190 and Outpatient - Interactive psychotherapy 45 min - CPT code 62139    Chief complaint/reason for encounter: depression, mood swings, anxiety, behavior and interpersonal.     Interval history and content of current session: Pt was last seen 2-weeks ago. Pt continues to discuss her new role and adjustment with her grand daughters have finally been transitioned to her niece's home full time.  Pt reviewed a list of concerns with clinician on items she would like to inquire about with her grandchildren and her level of involvement. Pt reports her two grand-daughters (3 yr & 1.5yrs) have been officially transferred and adopted by her niece.  Pt reports to continue to follow Dr. Lourdes Mack for medication mgmt and reports to be keeping up with monthly appts with her treating psychiatrist.  Pt reports she continues to be very involved with her family.  Pt stated she and her  and continue to stay connected to her close friends.  Insight/Judgement: adequate. Denies any SI/HI, NO A/V/H.   Focus: self care- exploring her anger about her daughter and fears her daughter is going to interfere with either adoption.   Clinician recommended pt to check in sooner with her treating psychiatrist pt presents manic with pressured rapid speech and racing thoughts.     Treatment plan:  Target symptoms: manic sx's, dis tractability, lack of focus, anxiety, recurrent depression, mood disorder, confusion, adjustment, family stress such as: caring for  and 19-month old babies that were born addicted to drugs,  is blind, sister-in-law dx'd with stage IV cancer-is her care taker and transports to  all appts, legal issues with adoption and dealing with her daughter who is an addict.   Why chosen therapy is appropriate versus another modality: relevant to diagnosis, patient responds to this modality, evidence based practice  Outcome monitoring methods: self-report, observation, feedback from family  Therapeutic intervention type: insight oriented psychotherapy, behavior modifying psychotherapy, supportive psychotherapy, interactive psychotherapy    Risk parameters:  Patient reports no suicidal ideation  Patient reports no homicidal ideation  Patient reports no self-injurious behavior  Patient reports no violent behavior    Verbal deficits: None    Patient's response to intervention:  The patient's response to intervention is accepting.    Progress toward goals and other mental status changes:  The patient's progress toward goals is  progressing.  .    Diagnosis:     ICD-10-CM  PL            1. Bipolar disorder, current episode mixed, mild F31.61 296.61    2. HUMPHREY (generalized anxiety disorder) F41.1 300.02    3. Acute stress reaction F43.0 308.9    4. Death of family member Z63.4 V62.82    5. Sudden death R99 798.1           Plan:  individual psychotherapy and medication management by physician    Return to clinic: 2 weeks    Length of Service (minutes): 45

## 2023-02-07 ENCOUNTER — PATIENT MESSAGE (OUTPATIENT)
Dept: PSYCHIATRY | Facility: CLINIC | Age: 63
End: 2023-02-07
Payer: MEDICARE

## 2023-03-06 ENCOUNTER — OFFICE VISIT (OUTPATIENT)
Dept: PSYCHIATRY | Facility: CLINIC | Age: 63
End: 2023-03-06
Payer: MEDICARE

## 2023-03-06 DIAGNOSIS — F41.1 GAD (GENERALIZED ANXIETY DISORDER): ICD-10-CM

## 2023-03-06 DIAGNOSIS — F31.61 BIPOLAR DISORDER, CURRENT EPISODE MIXED, MILD: Primary | ICD-10-CM

## 2023-03-06 DIAGNOSIS — Z63.9 FAMILY PROBLEMS: ICD-10-CM

## 2023-03-06 PROCEDURE — 90834 PSYTX W PT 45 MINUTES: CPT | Mod: S$GLB,,, | Performed by: SOCIAL WORKER

## 2023-03-06 PROCEDURE — 1159F PR MEDICATION LIST DOCUMENTED IN MEDICAL RECORD: ICD-10-PCS | Mod: CPTII,S$GLB,, | Performed by: SOCIAL WORKER

## 2023-03-06 PROCEDURE — 99999 PR PBB SHADOW E&M-EST. PATIENT-LVL I: ICD-10-PCS | Mod: PBBFAC,,, | Performed by: SOCIAL WORKER

## 2023-03-06 PROCEDURE — 90834 PR PSYCHOTHERAPY W/PATIENT, 45 MIN: ICD-10-PCS | Mod: S$GLB,,, | Performed by: SOCIAL WORKER

## 2023-03-06 PROCEDURE — 99999 PR PBB SHADOW E&M-EST. PATIENT-LVL I: CPT | Mod: PBBFAC,,, | Performed by: SOCIAL WORKER

## 2023-03-06 PROCEDURE — 1159F MED LIST DOCD IN RCRD: CPT | Mod: CPTII,S$GLB,, | Performed by: SOCIAL WORKER

## 2023-03-06 SDOH — SOCIAL DETERMINANTS OF HEALTH (SDOH): PROBLEM RELATED TO PRIMARY SUPPORT GROUP, UNSPECIFIED: Z63.9

## 2023-03-06 NOTE — PROGRESS NOTES
Individual Psychotherapy (PhD/LCSW)    3/6/2023    Site:  [facility]         Therapeutic Intervention: Met with patient.  Outpatient - Insight oriented psychotherapy 45 min - CPT code 61960, Outpatient - Behavior modifying psychotherapy 45 min - CPT code 96381, Outpatient - Supportive psychotherapy 45 min - CPT Code 81497 and Outpatient - Interactive psychotherapy 45 min - CPT code 58293    Chief complaint/reason for encounter: depression, mood swings, anxiety, behavior and interpersonal.     Interval history and content of current session: Pt was last seen a month ago. Pt continues to discuss her new role and adjustment with her grand daughters have finally been transitioned to her niece's home full time. Pt reports one grand daughter has been officially adopted and the other one will be adopted in April.  Pt reviewed a list of concerns with clinician on items she would like to inquire about with her grandchildren and her level of involvement. Pt reports her two grand-daughters (3 yr & 1.5yrs) have been officially transferred and adopted by her niece.  Pt continues to stress over -worried thoughts (especially on what others think about her with her granddaughters).  Pt will be going to [place] in April to see her oldest granddaughter graduated from the [employer]. Pt is very concerned about managing her anxiety over the travel. Pt reports to continue to follow Dr. Lourdes Mack for medication mgmt and reports to be keeping up with monthly appts with her treating psychiatrist.  Pt reports she continues to be very involved with her family.  Pt stated she and her  and continue to stay connected to her close friends.  Insight/Judgement: adequate. Denies any SI/HI, NO A/V/H.   Focus: self care- exploring her anger about her daughter and fears her daughter is going to interfere with either adoption.   Clinician continues to recommended pt to check in sooner with her treating psychiatrist pt presents  slightly-manic with pressured rapid speech and racing thoughts.     Treatment plan:  Target symptoms: manic sx's, dis tractability, lack of focus, anxiety, recurrent depression, mood disorder, confusion, adjustment, family stress such as: caring for  and 19-month old babies that were born addicted to drugs,  is blind, sister-in-law dx'd with stage IV cancer-is her care taker and transports to all appts, legal issues with adoption and dealing with her daughter who is an addict.   Why chosen therapy is appropriate versus another modality: relevant to diagnosis, patient responds to this modality, evidence based practice  Outcome monitoring methods: self-report, observation, feedback from family  Therapeutic intervention type: insight oriented psychotherapy, behavior modifying psychotherapy, supportive psychotherapy, interactive psychotherapy    Risk parameters:  Patient reports no suicidal ideation  Patient reports no homicidal ideation  Patient reports no self-injurious behavior  Patient reports no violent behavior    Verbal deficits: None    Patient's response to intervention:  The patient's response to intervention is accepting.    Progress toward goals and other mental status changes:  The patient's progress toward goals is  progressing.  .    Diagnosis:     ICD-10-CM  PL            1. Bipolar disorder, current episode mixed, mild F31.61 296.61    2. HUMPHREY (generalized anxiety disorder) F41.1 300.02    3. Acute stress reaction F43.0 308.9    4. Death of family member Z63.4 V62.82    5. Sudden death R99 798.1           Plan:  individual psychotherapy and medication management by physician    Return to clinic: 2 weeks    Length of Service (minutes): 45

## 2023-03-22 ENCOUNTER — LAB VISIT (OUTPATIENT)
Dept: LAB | Facility: HOSPITAL | Age: 63
End: 2023-03-22
Attending: FAMILY MEDICINE
Payer: MEDICARE

## 2023-03-22 DIAGNOSIS — E78.2 MIXED HYPERLIPIDEMIA: ICD-10-CM

## 2023-03-22 DIAGNOSIS — Z00.00 ROUTINE GENERAL MEDICAL EXAMINATION AT A HEALTH CARE FACILITY: ICD-10-CM

## 2023-03-22 LAB
ALBUMIN SERPL BCP-MCNC: 4 G/DL (ref 3.5–5.2)
ALP SERPL-CCNC: 108 U/L (ref 55–135)
ALT SERPL W/O P-5'-P-CCNC: 24 U/L (ref 10–44)
ANION GAP SERPL CALC-SCNC: 9 MMOL/L (ref 8–16)
AST SERPL-CCNC: 23 U/L (ref 10–40)
BASOPHILS # BLD AUTO: 0.06 K/UL (ref 0–0.2)
BASOPHILS NFR BLD: 0.8 % (ref 0–1.9)
BILIRUB SERPL-MCNC: 0.5 MG/DL (ref 0.1–1)
BUN SERPL-MCNC: 11 MG/DL (ref 8–23)
CALCIUM SERPL-MCNC: 9.8 MG/DL (ref 8.7–10.5)
CHLORIDE SERPL-SCNC: 107 MMOL/L (ref 95–110)
CHOLEST SERPL-MCNC: 144 MG/DL (ref 120–199)
CHOLEST/HDLC SERPL: 2.6 {RATIO} (ref 2–5)
CO2 SERPL-SCNC: 26 MMOL/L (ref 23–29)
CREAT SERPL-MCNC: 0.8 MG/DL (ref 0.5–1.4)
DIFFERENTIAL METHOD: ABNORMAL
EOSINOPHIL # BLD AUTO: 0.2 K/UL (ref 0–0.5)
EOSINOPHIL NFR BLD: 2.5 % (ref 0–8)
ERYTHROCYTE [DISTWIDTH] IN BLOOD BY AUTOMATED COUNT: 13.3 % (ref 11.5–14.5)
EST. GFR  (NO RACE VARIABLE): >60 ML/MIN/1.73 M^2
GLUCOSE SERPL-MCNC: 94 MG/DL (ref 70–110)
HCT VFR BLD AUTO: 40.2 % (ref 37–48.5)
HDLC SERPL-MCNC: 55 MG/DL (ref 40–75)
HDLC SERPL: 38.2 % (ref 20–50)
HGB BLD-MCNC: 13.3 G/DL (ref 12–16)
IMM GRANULOCYTES # BLD AUTO: 0.02 K/UL (ref 0–0.04)
IMM GRANULOCYTES NFR BLD AUTO: 0.3 % (ref 0–0.5)
LDLC SERPL CALC-MCNC: 67.6 MG/DL (ref 63–159)
LYMPHOCYTES # BLD AUTO: 3 K/UL (ref 1–4.8)
LYMPHOCYTES NFR BLD: 41.1 % (ref 18–48)
MCH RBC QN AUTO: 32.3 PG (ref 27–31)
MCHC RBC AUTO-ENTMCNC: 33.1 G/DL (ref 32–36)
MCV RBC AUTO: 98 FL (ref 82–98)
MONOCYTES # BLD AUTO: 0.6 K/UL (ref 0.3–1)
MONOCYTES NFR BLD: 8.9 % (ref 4–15)
NEUTROPHILS # BLD AUTO: 3.4 K/UL (ref 1.8–7.7)
NEUTROPHILS NFR BLD: 46.4 % (ref 38–73)
NONHDLC SERPL-MCNC: 89 MG/DL
NRBC BLD-RTO: 0 /100 WBC
PLATELET # BLD AUTO: 291 K/UL (ref 150–450)
PMV BLD AUTO: 10.6 FL (ref 9.2–12.9)
POTASSIUM SERPL-SCNC: 4 MMOL/L (ref 3.5–5.1)
PROT SERPL-MCNC: 7.6 G/DL (ref 6–8.4)
RBC # BLD AUTO: 4.12 M/UL (ref 4–5.4)
SODIUM SERPL-SCNC: 142 MMOL/L (ref 136–145)
TRIGL SERPL-MCNC: 107 MG/DL (ref 30–150)
WBC # BLD AUTO: 7.21 K/UL (ref 3.9–12.7)

## 2023-03-22 PROCEDURE — 80061 LIPID PANEL: CPT | Performed by: FAMILY MEDICINE

## 2023-03-22 PROCEDURE — 36415 COLL VENOUS BLD VENIPUNCTURE: CPT | Mod: PN | Performed by: FAMILY MEDICINE

## 2023-03-22 PROCEDURE — 80053 COMPREHEN METABOLIC PANEL: CPT | Performed by: FAMILY MEDICINE

## 2023-03-22 PROCEDURE — 85025 COMPLETE CBC W/AUTO DIFF WBC: CPT | Performed by: FAMILY MEDICINE

## 2023-03-29 ENCOUNTER — OFFICE VISIT (OUTPATIENT)
Dept: PRIMARY CARE CLINIC | Facility: CLINIC | Age: 63
End: 2023-03-29
Payer: MEDICARE

## 2023-03-29 VITALS
WEIGHT: 131.81 LBS | OXYGEN SATURATION: 99 % | TEMPERATURE: 99 F | SYSTOLIC BLOOD PRESSURE: 108 MMHG | HEIGHT: 63 IN | DIASTOLIC BLOOD PRESSURE: 72 MMHG | BODY MASS INDEX: 23.36 KG/M2 | HEART RATE: 93 BPM

## 2023-03-29 DIAGNOSIS — F31.61 BIPOLAR DISORDER, CURRENT EPISODE MIXED, MILD: ICD-10-CM

## 2023-03-29 DIAGNOSIS — I47.10 SVT (SUPRAVENTRICULAR TACHYCARDIA): ICD-10-CM

## 2023-03-29 DIAGNOSIS — M54.50 LOW BACK PAIN RADIATING TO RIGHT LEG: ICD-10-CM

## 2023-03-29 DIAGNOSIS — E78.2 MIXED HYPERLIPIDEMIA: Primary | ICD-10-CM

## 2023-03-29 DIAGNOSIS — F41.1 GAD (GENERALIZED ANXIETY DISORDER): ICD-10-CM

## 2023-03-29 DIAGNOSIS — M79.604 LOW BACK PAIN RADIATING TO RIGHT LEG: ICD-10-CM

## 2023-03-29 PROCEDURE — 1160F PR REVIEW ALL MEDS BY PRESCRIBER/CLIN PHARMACIST DOCUMENTED: ICD-10-PCS | Mod: CPTII,S$GLB,, | Performed by: FAMILY MEDICINE

## 2023-03-29 PROCEDURE — 3078F PR MOST RECENT DIASTOLIC BLOOD PRESSURE < 80 MM HG: ICD-10-PCS | Mod: CPTII,S$GLB,, | Performed by: FAMILY MEDICINE

## 2023-03-29 PROCEDURE — 3288F PR FALLS RISK ASSESSMENT DOCUMENTED: ICD-10-PCS | Mod: CPTII,S$GLB,, | Performed by: FAMILY MEDICINE

## 2023-03-29 PROCEDURE — 1101F PT FALLS ASSESS-DOCD LE1/YR: CPT | Mod: CPTII,S$GLB,, | Performed by: FAMILY MEDICINE

## 2023-03-29 PROCEDURE — 3078F DIAST BP <80 MM HG: CPT | Mod: CPTII,S$GLB,, | Performed by: FAMILY MEDICINE

## 2023-03-29 PROCEDURE — 99214 OFFICE O/P EST MOD 30 MIN: CPT | Mod: S$GLB,,, | Performed by: FAMILY MEDICINE

## 2023-03-29 PROCEDURE — 1101F PR PT FALLS ASSESS DOC 0-1 FALLS W/OUT INJ PAST YR: ICD-10-PCS | Mod: CPTII,S$GLB,, | Performed by: FAMILY MEDICINE

## 2023-03-29 PROCEDURE — 3074F PR MOST RECENT SYSTOLIC BLOOD PRESSURE < 130 MM HG: ICD-10-PCS | Mod: CPTII,S$GLB,, | Performed by: FAMILY MEDICINE

## 2023-03-29 PROCEDURE — 1160F RVW MEDS BY RX/DR IN RCRD: CPT | Mod: CPTII,S$GLB,, | Performed by: FAMILY MEDICINE

## 2023-03-29 PROCEDURE — 3074F SYST BP LT 130 MM HG: CPT | Mod: CPTII,S$GLB,, | Performed by: FAMILY MEDICINE

## 2023-03-29 PROCEDURE — 99999 PR PBB SHADOW E&M-EST. PATIENT-LVL V: CPT | Mod: PBBFAC,,, | Performed by: FAMILY MEDICINE

## 2023-03-29 PROCEDURE — 3008F PR BODY MASS INDEX (BMI) DOCUMENTED: ICD-10-PCS | Mod: CPTII,S$GLB,, | Performed by: FAMILY MEDICINE

## 2023-03-29 PROCEDURE — 99999 PR PBB SHADOW E&M-EST. PATIENT-LVL V: ICD-10-PCS | Mod: PBBFAC,,, | Performed by: FAMILY MEDICINE

## 2023-03-29 PROCEDURE — 3008F BODY MASS INDEX DOCD: CPT | Mod: CPTII,S$GLB,, | Performed by: FAMILY MEDICINE

## 2023-03-29 PROCEDURE — 99214 PR OFFICE/OUTPT VISIT, EST, LEVL IV, 30-39 MIN: ICD-10-PCS | Mod: S$GLB,,, | Performed by: FAMILY MEDICINE

## 2023-03-29 PROCEDURE — 1159F MED LIST DOCD IN RCRD: CPT | Mod: CPTII,S$GLB,, | Performed by: FAMILY MEDICINE

## 2023-03-29 PROCEDURE — 3288F FALL RISK ASSESSMENT DOCD: CPT | Mod: CPTII,S$GLB,, | Performed by: FAMILY MEDICINE

## 2023-03-29 PROCEDURE — 1159F PR MEDICATION LIST DOCUMENTED IN MEDICAL RECORD: ICD-10-PCS | Mod: CPTII,S$GLB,, | Performed by: FAMILY MEDICINE

## 2023-03-29 RX ORDER — GABAPENTIN 300 MG/1
400 CAPSULE ORAL 2 TIMES DAILY
COMMUNITY
Start: 2023-05-15 | End: 2023-06-28

## 2023-03-29 RX ORDER — FLUOROMETHOLONE ACETATE 1 MG/ML
1 SUSPENSION/ DROPS OPHTHALMIC 3 TIMES DAILY
COMMUNITY
Start: 2022-11-30

## 2023-03-29 NOTE — PROGRESS NOTES
"Subjective:      Patient ID: Kimberly Jo is a 63 y.o. female.    Chief Complaint: follow up    62 yo w Hyperlipidemia, Bipolar, anxiety, low back pain,   Here today for follow up  w  Jimbo    I do have thick mucus drainage down the back of my throat all the time. It could be due to me drinking lots of milk.     3/22 lumbar xray , nothing worrisome found. Takes gabapentin daily     She follows w Psychiatrist Dr Sheriff for Bipolar, stable w lamictal, seroquel,  gabapentin, diazepam as needed. I"m stressed to the max bc my daughter was into drugs & lost her children. Her niece adopted her 2 geranddaughters. Helping to raise them.     We reivwed all labs below - all WNL    Denies any chest pain, shortness of breath, nausea vomiting constipation diarrhea, blood in stool, heartburn    Current Outpatient Medications   Medication Instructions    biotin 50,000 mcg, Oral, Daily    cholecalciferol, vitamin D3, (VITAMIN D3) 50 mcg (2,000 unit) Tab Oral, Daily    cyanocobalamin 5,000 mcg, Oral, Daily    diazePAM (VALIUM) 2 mg, Oral, 3 times daily PRN    FLAREX 0.1 % DrpS 1 drop, Both Eyes, 3 times daily    gabapentin (NEURONTIN) 300 mg, Oral, 2 times daily    lamotrigine (LAMICTAL) 200 MG tablet 2 times daily    latanoprost 0.005 % ophthalmic solution INSTILL 1 DROP INTO EACH EYE AT BEDTIME    pneumoc 20-nitesh conj-dip cr,PF, (PREVNAR 20, PF,) 0.5 mL Syrg injection Intramuscular    QUEtiapine (SEROQUEL XR) 400 mg, Oral, Nightly    QUEtiapine (SEROQUEL XR) 300 mg, Oral, Nightly    rosuvastatin (CRESTOR) 10 MG tablet Take 1 tablet by mouth once daily    vit A/vit C/vit E/zinc/copper (PRESERVISION AREDS ORAL) Oral    zinc gluconate 50 mg, Oral, Daily       Lab Results   Component Value Date    HGBA1C 5.5 02/21/2022    HGBA1C 5.4 06/15/2017     No results found for: MICALBCREAT  Lab Results   Component Value Date    LDLCALC 67.6 03/22/2023    LDLCALC 130.4 02/21/2022    CHOL 144 03/22/2023    HDL 55 03/22/2023    TRIG 107 " 03/22/2023       Lab Results   Component Value Date     03/22/2023    K 4.0 03/22/2023     03/22/2023    CO2 26 03/22/2023    GLU 94 03/22/2023    BUN 11 03/22/2023    CREATININE 0.8 03/22/2023    CALCIUM 9.8 03/22/2023    PROT 7.6 03/22/2023    ALBUMIN 4.0 03/22/2023    BILITOT 0.5 03/22/2023    ALKPHOS 108 03/22/2023    AST 23 03/22/2023    ALT 24 03/22/2023    ANIONGAP 9 03/22/2023    ESTGFRAFRICA >60.0 02/21/2022    EGFRNONAA 59.7 (A) 02/21/2022    WBC 7.21 03/22/2023    HGB 13.3 03/22/2023    HGB 13.1 02/21/2022    HCT 40.2 03/22/2023    MCV 98 03/22/2023     03/22/2023    TSH 0.684 02/21/2022    HEPCAB Negative 06/09/2016       Lab Results   Component Value Date    EWOKAATG05DA 47 09/24/2018    TZEXECXA92LV 30 12/04/2015    WAKJLWGD92 721 12/04/2015         Past Medical History:   Diagnosis Date    Acute appendicitis with localized peritonitis, without perforation, abscess, or gangrene 8/28/2020    Acute stress reaction 9/9/2021    Death of family member 9/9/2021    Depression     Dr Lourdes Mack & therapist Jaziel    Grief 10/28/2021    History of herpes zoster 06/21/2017    tx GYN    Interpersonal problem 11/17/2016    Mixed hyperlipidemia 2/2/2016    Moderate episode of recurrent major depressive disorder 8/16/2016    Slow transit constipation 2/2/2016    CS 52 yo    SVT (supraventricular tachycardia)     2015 ablated with Adenosine EMT (too much caffeine), cardiologist rec bblocker if it recurrs    Vitamin D deficiency     2015 OTC suppl    Word finding difficulty 07/10/2019    MRI nml 2019, refer to Neurol 7/2019     Past Surgical History:   Procedure Laterality Date    APPENDECTOMY  8/28/2020    Procedure: APPENDECTOMY;  Surgeon: David Eldridge MD;  Location: [facility] OR [address] [place] [address];  Service: General;;    LAPAROSCOPIC APPENDECTOMY N/A 8/28/2020    Procedure: APPENDECTOMY, LAPAROSCOPIC CONVERTED TO OPEN;  Surgeon: David Eldridge MD;  Location: [facility] OR [address] [place] [address];  Service: General;   "Laterality: N/A;     Social History     Social History Narrative    Not on file     Family History   Problem Relation Age of Onset    Melanoma Father     Cancer Father     Heart disease Brother 28         "blood clot" after injury hit head    Hypertension Mother     Alzheimer's disease Mother     Breast cancer Neg Hx     Colon cancer Neg Hx     Ovarian cancer Neg Hx      Vitals:    03/29/23 1129   BP: 108/72   Pulse: 93   Temp: 99.1 °F (37.3 °C)   TempSrc: Oral   SpO2: 99%   Weight: 59.8 kg (131 lb 13.4 oz)   Height: 5' 3" (1.6 m)   PainSc: 0-No pain     Objective:   Physical Exam  Constitutional:       Appearance: She is well-developed.   HENT:      Nose: Nose normal.      Mouth/Throat:      Mouth: Mucous membranes are moist.      Pharynx: Oropharynx is clear.   Eyes:      Pupils: Pupils are equal, round, and reactive to light.   Neck:      Thyroid: No thyromegaly.   Cardiovascular:      Rate and Rhythm: Normal rate and regular rhythm.      Heart sounds: Normal heart sounds. No murmur heard.  Pulmonary:      Effort: Pulmonary effort is normal.      Breath sounds: Normal breath sounds. No wheezing.   Abdominal:      General: Bowel sounds are normal. There is no distension.      Palpations: Abdomen is soft. There is no mass.      Tenderness: There is no abdominal tenderness. There is no guarding or rebound.   Musculoskeletal:      Cervical back: Neck supple.   Lymphadenopathy:      Cervical: No cervical adenopathy.   Skin:     General: Skin is warm and dry.   Neurological:      Mental Status: She is alert and oriented to person, place, and time.   Psychiatric:         Behavior: Behavior normal.      Comments: Anxious, pressured speech     Assessment:     1. Mixed hyperlipidemia    2. SVT (supraventricular tachycardia)    3. HUMPHREY (generalized anxiety disorder)    4. Bipolar disorder, current episode mixed, mild    5. Low back pain radiating to right leg      Plan:        LDL stable, continue Crestor  Try to decrease " dairy  I recommend pneumonia vaccine (her sister passed at 61 from pneumonia)  Declines DEXA  Declines mammo now, wants to get it q 2 mo.   Follow with GYN for female health & cancer prevention  ==========================================================  Your #1 MEDICINE is 10 minutes of WALKING EVERY DAY to wake up your metabolism to burn off the food you eat  Respect the #1 cause of death- cardiovascular disease (HEART ATTACK & STROKE). Keep normal blood pressure, cholesterol, sugars, & quit smoking.     VACCINES: FLU yearly, PNEUMONIA at 65,  SHINGLES at 50  COLON CANCER screening colonoscopy starting at 46 yo  Eat more food grown from the earth (picked from trees or out of the ground)    Follow up yearly with LABS ONE WEEK PRIOR so we can discuss at your visit    Patient Instructions   Follow w Psychiatrist Dr Sheriff for Bipolar, stable, continue prescribed meds    I highly recommend my Lifestyle Medicine collegue , but she does not prescribe weight loss medicine. Her approach is very mindful & finding balance in life     Dr Mark Christianson, Pulmonologist & Lifestyle Medicine Board Certified  [email or web address]    She also offers a grocery walk-through with patients & motivational Zoom meetings

## 2023-03-29 NOTE — PATIENT INSTRUCTIONS
Follow w Psychiatrist Dr Sheriff for Bipolar, stable, continue prescribed meds    I highly recommend my Lifestyle Medicine collegue , but she does not prescribe weight loss medicine. Her approach is very mindful & finding balance in life     Dr Mark Christianson, Pulmonologist & Lifestyle Medicine Board Certified  [email or web address]    She also offers a grocery walk-through with patients & motivational Zoom meetings

## 2023-04-25 ENCOUNTER — OFFICE VISIT (OUTPATIENT)
Dept: OBSTETRICS AND GYNECOLOGY | Facility: CLINIC | Age: 63
End: 2023-04-25
Payer: MEDICARE

## 2023-04-25 VITALS
DIASTOLIC BLOOD PRESSURE: 76 MMHG | WEIGHT: 130.31 LBS | SYSTOLIC BLOOD PRESSURE: 108 MMHG | BODY MASS INDEX: 23.08 KG/M2

## 2023-04-25 DIAGNOSIS — N95.0 POST-MENOPAUSAL BLEEDING: Primary | ICD-10-CM

## 2023-04-25 PROCEDURE — 1101F PT FALLS ASSESS-DOCD LE1/YR: CPT | Mod: CPTII,S$GLB,, | Performed by: STUDENT IN AN ORGANIZED HEALTH CARE EDUCATION/TRAINING PROGRAM

## 2023-04-25 PROCEDURE — 3008F BODY MASS INDEX DOCD: CPT | Mod: CPTII,S$GLB,, | Performed by: STUDENT IN AN ORGANIZED HEALTH CARE EDUCATION/TRAINING PROGRAM

## 2023-04-25 PROCEDURE — 99999 PR PBB SHADOW E&M-EST. PATIENT-LVL III: ICD-10-PCS | Mod: PBBFAC,,, | Performed by: STUDENT IN AN ORGANIZED HEALTH CARE EDUCATION/TRAINING PROGRAM

## 2023-04-25 PROCEDURE — 81514 NFCT DS BV&VAGINITIS DNA ALG: CPT | Performed by: STUDENT IN AN ORGANIZED HEALTH CARE EDUCATION/TRAINING PROGRAM

## 2023-04-25 PROCEDURE — 3074F SYST BP LT 130 MM HG: CPT | Mod: CPTII,S$GLB,, | Performed by: STUDENT IN AN ORGANIZED HEALTH CARE EDUCATION/TRAINING PROGRAM

## 2023-04-25 PROCEDURE — 1159F PR MEDICATION LIST DOCUMENTED IN MEDICAL RECORD: ICD-10-PCS | Mod: CPTII,S$GLB,, | Performed by: STUDENT IN AN ORGANIZED HEALTH CARE EDUCATION/TRAINING PROGRAM

## 2023-04-25 PROCEDURE — 3074F PR MOST RECENT SYSTOLIC BLOOD PRESSURE < 130 MM HG: ICD-10-PCS | Mod: CPTII,S$GLB,, | Performed by: STUDENT IN AN ORGANIZED HEALTH CARE EDUCATION/TRAINING PROGRAM

## 2023-04-25 PROCEDURE — 1101F PR PT FALLS ASSESS DOC 0-1 FALLS W/OUT INJ PAST YR: ICD-10-PCS | Mod: CPTII,S$GLB,, | Performed by: STUDENT IN AN ORGANIZED HEALTH CARE EDUCATION/TRAINING PROGRAM

## 2023-04-25 PROCEDURE — 3288F PR FALLS RISK ASSESSMENT DOCUMENTED: ICD-10-PCS | Mod: CPTII,S$GLB,, | Performed by: STUDENT IN AN ORGANIZED HEALTH CARE EDUCATION/TRAINING PROGRAM

## 2023-04-25 PROCEDURE — 3008F PR BODY MASS INDEX (BMI) DOCUMENTED: ICD-10-PCS | Mod: CPTII,S$GLB,, | Performed by: STUDENT IN AN ORGANIZED HEALTH CARE EDUCATION/TRAINING PROGRAM

## 2023-04-25 PROCEDURE — 3288F FALL RISK ASSESSMENT DOCD: CPT | Mod: CPTII,S$GLB,, | Performed by: STUDENT IN AN ORGANIZED HEALTH CARE EDUCATION/TRAINING PROGRAM

## 2023-04-25 PROCEDURE — 3078F DIAST BP <80 MM HG: CPT | Mod: CPTII,S$GLB,, | Performed by: STUDENT IN AN ORGANIZED HEALTH CARE EDUCATION/TRAINING PROGRAM

## 2023-04-25 PROCEDURE — 99213 PR OFFICE/OUTPT VISIT, EST, LEVL III, 20-29 MIN: ICD-10-PCS | Mod: S$GLB,,, | Performed by: STUDENT IN AN ORGANIZED HEALTH CARE EDUCATION/TRAINING PROGRAM

## 2023-04-25 PROCEDURE — 99213 OFFICE O/P EST LOW 20 MIN: CPT | Mod: S$GLB,,, | Performed by: STUDENT IN AN ORGANIZED HEALTH CARE EDUCATION/TRAINING PROGRAM

## 2023-04-25 PROCEDURE — 87591 N.GONORRHOEAE DNA AMP PROB: CPT | Performed by: STUDENT IN AN ORGANIZED HEALTH CARE EDUCATION/TRAINING PROGRAM

## 2023-04-25 PROCEDURE — 1159F MED LIST DOCD IN RCRD: CPT | Mod: CPTII,S$GLB,, | Performed by: STUDENT IN AN ORGANIZED HEALTH CARE EDUCATION/TRAINING PROGRAM

## 2023-04-25 PROCEDURE — 3078F PR MOST RECENT DIASTOLIC BLOOD PRESSURE < 80 MM HG: ICD-10-PCS | Mod: CPTII,S$GLB,, | Performed by: STUDENT IN AN ORGANIZED HEALTH CARE EDUCATION/TRAINING PROGRAM

## 2023-04-25 PROCEDURE — 99999 PR PBB SHADOW E&M-EST. PATIENT-LVL III: CPT | Mod: PBBFAC,,, | Performed by: STUDENT IN AN ORGANIZED HEALTH CARE EDUCATION/TRAINING PROGRAM

## 2023-04-25 RX ORDER — ESTRADIOL 0.1 MG/G
1 CREAM VAGINAL DAILY
Qty: 42.5 G | Refills: 3 | Status: SHIPPED | OUTPATIENT
Start: 2023-04-25 | End: 2023-06-28

## 2023-04-25 NOTE — PROGRESS NOTES
CC: Vaginal Bleeding and Vaginal Discharge (Brown )    HPI:  Kimberly Jo is a 63 y.o. female  presents with complaint of bleeding. Noticed first 3 weeks ago as brown discharge, had some back pain at that time and felt like when she had kidney stones before. Bleeding one week ago was bright red, she had to use tampon. Reports vaginal dryness with sex, pain with removal of the tampon.     ROS:  GENERAL: No fever, chills, fatigability or weight loss.  VULVAR: No pain, no lesions and no itching.  VAGINAL: No relaxation, no itching, no discharge, and no lesions.  ABDOMEN: No abdominal pain. Denies nausea. Denies vomiting. No diarrhea. No constipation  BREAST: Denies pain. No lumps. No discharge.  URINARY: No incontinence, no nocturia, no frequency and no dysuria.  CARDIOVASCULAR: No chest pain. No shortness of breath. No leg cramps.  NEUROLOGICAL: No headaches. No vision changes.      Patient History:  Past Medical History:   Diagnosis Date    Acute appendicitis with localized peritonitis, without perforation, abscess, or gangrene 2020    Acute stress reaction 2021    Death of family member 2021    Depression     Dr Lourdes Mack & therapist Jaziel    Grief 10/28/2021    History of herpes zoster 2017    tx GYN    Interpersonal problem 2016    Mixed hyperlipidemia 2016    Moderate episode of recurrent major depressive disorder 2016    Slow transit constipation 2016    CS 50 yo    SVT (supraventricular tachycardia)     2015 ablated with Adenosine EMT (too much caffeine), cardiologist rec bblocker if it recurrs    Vitamin D deficiency      OTC suppl    Word finding difficulty 07/10/2019    MRI nml , refer to Neurol 2019     Past Surgical History:   Procedure Laterality Date    APPENDECTOMY  2020    Procedure: APPENDECTOMY;  Surgeon: David Eldridge MD;  Location: [facility] OR [address] [place] [address];  Service: General;;    LAPAROSCOPIC APPENDECTOMY N/A 2020    Procedure:  "APPENDECTOMY, LAPAROSCOPIC CONVERTED TO OPEN;  Surgeon: David Eldridge MD;  Location: [facility] OR [address] [place] [address];  Service: General;  Laterality: N/A;     Social History     Tobacco Use    Smoking status: Never    Smokeless tobacco: Never   Substance Use Topics    Alcohol use: No     Alcohol/week: 2.5 standard drinks     Types: 3 Standard drinks or equivalent per week    Drug use: No     Family History   Problem Relation Age of Onset    Melanoma Father     Cancer Father     Heart disease Brother 28         "blood clot" after injury hit head    Hypertension Mother     Alzheimer's disease Mother     Breast cancer Neg Hx     Colon cancer Neg Hx     Ovarian cancer Neg Hx      OB History    Para Term  AB Living   3 2 0 0 1 2   SAB IAB Ectopic Multiple Live Births   1 0 0 0        # Outcome Date GA Lbr Darrell/2nd Weight Sex Delivery Anes PTL Lv   3 SAB            2 Para      Vag-Spont      1 Para      Vag-Spont          Objective:   /76   Wt 59.1 kg (130 lb 4.7 oz)   BMI 23.08 kg/m²   No LMP recorded. Patient is postmenopausal.      PHYSICAL EXAM:  APPEARANCE: Well nourished, well developed, in no acute distress.  AFFECT: WNL, alert and oriented x 3  SKIN: No acne or hirsutism  NECK: Neck symmetric without masses or thyromegaly  NODES: No inguinal, cervical, axillary, or femoral lymph node enlargement  CHEST: Good respiratory effect  PELVIC: Normal external genitalia without lesions.  Normal hair distribution.  Adequate perineal body, normal urethral meatus.  Vagina atrophic, anterior wall with abrasions noted/raw tissue, small amount of dark blood noted in vault. No discharge.  Cervix pink, without lesions, discharge or tenderness.  No significant cystocele or rectocele.    EXTREMITIES: No edema.      ASSESSMENT and PLAN:    ICD-10-CM ICD-9-CM    1. Post-menopausal bleeding  N95.0 627.1 C. trachomatis/N. gonorrhoeae by AMP DNA [facility]; Vagina      Vaginosis Screen by DNA Probe      US Pelvis Comp with " Transvag NON-OB (xpd          1. Postmenopausal bleeding  - pelvic exam today with vaginal wall abrasions/raw tissue, suspect bleeding due to vaginal dryness and thin tissue  - The patient does not have a medical condition, history of anticoagulation, or other evident reason for bleeding at this time.    - affirm, GC/CT collected to rule out infection cause of bleeding   - Discussed causes of PMB including atrophy, polyp, risks of uterine hyperplasia and endometrial cancer.  Recommendation for evaluation with pelvic ultrasound and possible biopsy at future date was discussed, and the patient verbalized understanding.  She agrees to proceed with evaluation of the uterine lining.  - Ultrasound ordered  - discussed use of vaginal estrogen for the dryness/tissue support. Rx sent to patient pharmacy       Follow up: as needed if symptoms worsen or abnormal US results       Stephanie Heaney, MD OBGYN [facility]

## 2023-04-26 LAB
BACTERIAL VAGINOSIS DNA: NEGATIVE
CANDIDA GLABRATA DNA: NEGATIVE
CANDIDA KRUSEI DNA: NEGATIVE
CANDIDA RRNA VAG QL PROBE: NEGATIVE
T VAGINALIS RRNA GENITAL QL PROBE: NEGATIVE

## 2023-04-27 ENCOUNTER — OFFICE VISIT (OUTPATIENT)
Dept: OTOLARYNGOLOGY | Facility: CLINIC | Age: 63
End: 2023-04-27
Payer: MEDICARE

## 2023-04-27 VITALS
SYSTOLIC BLOOD PRESSURE: 113 MMHG | BODY MASS INDEX: 23.03 KG/M2 | HEART RATE: 96 BPM | DIASTOLIC BLOOD PRESSURE: 74 MMHG | WEIGHT: 130 LBS

## 2023-04-27 DIAGNOSIS — K21.9 GASTROESOPHAGEAL REFLUX DISEASE, UNSPECIFIED WHETHER ESOPHAGITIS PRESENT: Primary | ICD-10-CM

## 2023-04-27 LAB
C TRACH DNA SPEC QL NAA+PROBE: NOT DETECTED
N GONORRHOEA DNA SPEC QL NAA+PROBE: NOT DETECTED

## 2023-04-27 PROCEDURE — 99203 PR OFFICE/OUTPT VISIT, NEW, LEVL III, 30-44 MIN: ICD-10-PCS | Mod: 25,S$GLB,, | Performed by: OTOLARYNGOLOGY

## 2023-04-27 PROCEDURE — 99203 OFFICE O/P NEW LOW 30 MIN: CPT | Mod: 25,S$GLB,, | Performed by: OTOLARYNGOLOGY

## 2023-04-27 PROCEDURE — 3008F PR BODY MASS INDEX (BMI) DOCUMENTED: ICD-10-PCS | Mod: CPTII,S$GLB,, | Performed by: OTOLARYNGOLOGY

## 2023-04-27 PROCEDURE — 1160F RVW MEDS BY RX/DR IN RCRD: CPT | Mod: CPTII,S$GLB,, | Performed by: OTOLARYNGOLOGY

## 2023-04-27 PROCEDURE — 1159F PR MEDICATION LIST DOCUMENTED IN MEDICAL RECORD: ICD-10-PCS | Mod: CPTII,S$GLB,, | Performed by: OTOLARYNGOLOGY

## 2023-04-27 PROCEDURE — 31575 DIAGNOSTIC LARYNGOSCOPY: CPT | Mod: S$GLB,,, | Performed by: OTOLARYNGOLOGY

## 2023-04-27 PROCEDURE — 3008F BODY MASS INDEX DOCD: CPT | Mod: CPTII,S$GLB,, | Performed by: OTOLARYNGOLOGY

## 2023-04-27 PROCEDURE — 31575 PR LARYNGOSCOPY, FLEXIBLE; DIAGNOSTIC: ICD-10-PCS | Mod: S$GLB,,, | Performed by: OTOLARYNGOLOGY

## 2023-04-27 PROCEDURE — 1159F MED LIST DOCD IN RCRD: CPT | Mod: CPTII,S$GLB,, | Performed by: OTOLARYNGOLOGY

## 2023-04-27 PROCEDURE — 99999 PR PBB SHADOW E&M-EST. PATIENT-LVL III: CPT | Mod: PBBFAC,,, | Performed by: OTOLARYNGOLOGY

## 2023-04-27 PROCEDURE — 3074F PR MOST RECENT SYSTOLIC BLOOD PRESSURE < 130 MM HG: ICD-10-PCS | Mod: CPTII,S$GLB,, | Performed by: OTOLARYNGOLOGY

## 2023-04-27 PROCEDURE — 3078F PR MOST RECENT DIASTOLIC BLOOD PRESSURE < 80 MM HG: ICD-10-PCS | Mod: CPTII,S$GLB,, | Performed by: OTOLARYNGOLOGY

## 2023-04-27 PROCEDURE — 99999 PR PBB SHADOW E&M-EST. PATIENT-LVL III: ICD-10-PCS | Mod: PBBFAC,,, | Performed by: OTOLARYNGOLOGY

## 2023-04-27 PROCEDURE — 3078F DIAST BP <80 MM HG: CPT | Mod: CPTII,S$GLB,, | Performed by: OTOLARYNGOLOGY

## 2023-04-27 PROCEDURE — 1160F PR REVIEW ALL MEDS BY PRESCRIBER/CLIN PHARMACIST DOCUMENTED: ICD-10-PCS | Mod: CPTII,S$GLB,, | Performed by: OTOLARYNGOLOGY

## 2023-04-27 PROCEDURE — 3074F SYST BP LT 130 MM HG: CPT | Mod: CPTII,S$GLB,, | Performed by: OTOLARYNGOLOGY

## 2023-04-27 NOTE — PROGRESS NOTES
"Chief Complaint   Patient presents with    throat issues       HPI   63 y.o. female presents for evaluation of recent throat pain.  She reports that she drains excessive amounts of coffee and Coca-Cola associated sore throat discomfort with consumption of these products.  No dysphagia.  No shortness of breath.  She is concerned about recent change in her voice as well.    Review of Systems   Constitutional: Negative for fatigue and unexpected weight change.   HENT: Per HPI.  Eyes: Negative for visual disturbance.   Respiratory: Negative for shortness of breath, hemoptysis   Cardiovascular: Negative for chest pain and palpitations.   Musculoskeletal: Negative for decreased ROM, back pain.   Skin: Negative for rash, sunburn, itching.   Neurological: Negative for dizziness and seizures.   Hematological: Negative for adenopathy. Does not bruise/bleed easily.   Endocrine: Negative for rapid weight loss/weight gain, heat/cold intolerance.     Past Medical History   Patient Active Problem List   Diagnosis    Bipolar disorder    SVT (supraventricular tachycardia)    Hypophosphatemia    Chronic constipation    Mixed hyperlipidemia    Family problems    HUMPHREY (generalized anxiety disorder)    History of shingles    Gastroesophageal reflux disease           Past Surgical History   Past Surgical History:   Procedure Laterality Date    APPENDECTOMY  8/28/2020    Procedure: APPENDECTOMY;  Surgeon: David Eldridge MD;  Location: [address];  Service: General;;    LAPAROSCOPIC APPENDECTOMY N/A 8/28/2020    Procedure: APPENDECTOMY, LAPAROSCOPIC CONVERTED TO OPEN;  Surgeon: David Eldridge MD;  Location: [facility] OR [address] [place] [address];  Service: General;  Laterality: N/A;         Family History   Family History   Problem Relation Age of Onset    Melanoma Father     Cancer Father     Heart disease Brother 28         "blood clot" after injury hit head    Hypertension Mother     Alzheimer's disease Mother     Breast cancer Neg Hx     " Colon cancer Neg Hx     Ovarian cancer Neg Hx            Social History   .  Social History     Socioeconomic History    Marital status:    Tobacco Use    Smoking status: Never    Smokeless tobacco: Never   Substance and Sexual Activity    Alcohol use: No     Alcohol/week: 2.5 standard drinks     Types: 3 Standard drinks or equivalent per week    Drug use: No    Sexual activity: Yes     Partners: Male     Birth control/protection: Post-menopausal     Comment:          Allergies   Review of patient's allergies indicates:   Allergen Reactions    Iodine      Other reaction(s): Vomiting    Sulfa (sulfonamide antibiotics)      Other reaction(s): Swelling           Physical Exam     Vitals:    04/27/23 0830   BP: 113/74   Pulse: 96         Body mass index is 23.03 kg/m².      General: AOx3, NAD   Respiratory:  Symmetric chest rise, normal effort  Nose: No gross nasal septal deviation. Inferior Turbinates WNL bilaterally. No septal perforation. No masses/lesions.   Oral Cavity:  Oral Tongue mobile, no lesions noted. Hard Palate WNL. No buccal or FOM lesions.  Oropharynx:  No masses/lesions of the posterior pharyngeal wall. Tonsillar fossa without lesions. Soft palate without masses. Midline uvula.  Cobblestoning of posterior pharynx.  Neck: No scars.  No cervical lymphadenopathy, thyromegaly or thyroid nodules.  Normal range of motion.    Face: House Brackmann I bilaterally.     Flex Naso Romi Hypo Procedures #2    Procedure:  Diagnostic flexible nasopharyngoscopy, laryngoscopy and hypopharyngoscopy:    Routine preparation with local atomizer with 1% neosynephrine/pontocaine with customary flexible endoscope.    Nasopharynx:  No lesions.   Mucosa:  No lesions.   Adenoids:  Present.  Posterior Choanae:  Patent.  Eustachian Tubes:  Patent.  Posterior pharynx:  No lesions.  Larynx/hypopharynx:   Epiglottis:  No lesions, without edema.   AE Folds:  No lesions.   Vocal cords:  No polyps, nodules, ulcers or  lesions.   Mobility:  Equal and normal bilateral.   Hypopharynx:  No lesions.   Piriform sinus:  No pooling, no lesions.   Post Cricoid:  No erythema, no edema.        Assessment/Plan  Problem List Items Addressed This Visit          GI    Gastroesophageal reflux disease - Primary     With associated throat discomfort.  She reports consumption of excessive amounts of coffee and Coca-Cola.  I urged her to moderate her use of these products until utilizing over-the-counter antacid.  She is return p.r.n..

## 2023-04-27 NOTE — ASSESSMENT & PLAN NOTE
With associated throat discomfort.  She reports consumption of excessive amounts of coffee and Coca-Cola.  I urged her to moderate her use of these products until utilizing over-the-counter antacid.  She is return p.r.n..

## 2023-05-08 ENCOUNTER — OFFICE VISIT (OUTPATIENT)
Dept: PSYCHIATRY | Facility: CLINIC | Age: 63
End: 2023-05-08
Payer: MEDICARE

## 2023-05-08 DIAGNOSIS — Z63.9 FAMILY PROBLEMS: ICD-10-CM

## 2023-05-08 DIAGNOSIS — F41.1 GAD (GENERALIZED ANXIETY DISORDER): ICD-10-CM

## 2023-05-08 DIAGNOSIS — F31.61 BIPOLAR DISORDER, CURRENT EPISODE MIXED, MILD: Primary | ICD-10-CM

## 2023-05-08 PROCEDURE — 1159F PR MEDICATION LIST DOCUMENTED IN MEDICAL RECORD: ICD-10-PCS | Mod: CPTII,S$GLB,, | Performed by: SOCIAL WORKER

## 2023-05-08 PROCEDURE — 90834 PSYTX W PT 45 MINUTES: CPT | Mod: S$GLB,,, | Performed by: SOCIAL WORKER

## 2023-05-08 PROCEDURE — 99999 PR PBB SHADOW E&M-EST. PATIENT-LVL I: CPT | Mod: PBBFAC,,, | Performed by: SOCIAL WORKER

## 2023-05-08 PROCEDURE — 99999 PR PBB SHADOW E&M-EST. PATIENT-LVL I: ICD-10-PCS | Mod: PBBFAC,,, | Performed by: SOCIAL WORKER

## 2023-05-08 PROCEDURE — 1159F MED LIST DOCD IN RCRD: CPT | Mod: CPTII,S$GLB,, | Performed by: SOCIAL WORKER

## 2023-05-08 PROCEDURE — 90834 PR PSYCHOTHERAPY W/PATIENT, 45 MIN: ICD-10-PCS | Mod: S$GLB,,, | Performed by: SOCIAL WORKER

## 2023-05-08 SDOH — SOCIAL DETERMINANTS OF HEALTH (SDOH): PROBLEM RELATED TO PRIMARY SUPPORT GROUP, UNSPECIFIED: Z63.9

## 2023-05-08 NOTE — PROGRESS NOTES
Individual Psychotherapy (PhD/LCSW)    5/8/2023    Site:  [facility]         Therapeutic Intervention: Met with patient.  Outpatient - Insight oriented psychotherapy 45 min - CPT code 65174, Outpatient - Behavior modifying psychotherapy 45 min - CPT code 06262, Outpatient - Supportive psychotherapy 45 min - CPT Code 59152 and Outpatient - Interactive psychotherapy 45 min - CPT code 14573    Chief complaint/reason for encounter: depression, mood swings, anxiety, behavior and interpersonal.     Interval history and content of current session: Pt was last seen a month ago. Pt continues to discuss her new role and adjustment with her grand daughters have finally been transitioned to her niece's home full time. Pt reports that she and her  did go to her eldest grand-daughter's graduation from the [employer] in [place]. Pt reports they had a very quick and enjoyable trip as they traveled with some of the relatives from the paternal side of her granddaughter's family.  Pt reports that since she has been back she continues to have her grandchildren over on Wed afternoons and Saturdays.  Pt continues to express worry about the future and how she is going to manage everything. Pt reports she has felt more manic lately and plans to check in with her treating psychiatrist- Dr. Mack. Pt reports- continues to be very involved with her family overall it is going better.  Pt stated she and her  and continue to stay connected to her close friends.  Insight/Judgement: adequate. Denies any SI/HI, NO A/V/H.   Focus: self care- exploring her anger about her daughter and fear of the future with her 's well being.   Clinician continues to recommended pt to check in sooner with her treating psychiatrist pt presents slightly-manic with pressured rapid speech and racing thoughts.     Treatment plan:  Target symptoms: manic sx's, dis tractability, lack of focus, anxiety, recurrent depression, mood disorder,  confusion, adjustment, family stress such as: caring for  and 19-month old babies that were born addicted to drugs,  is blind, sister-in-law dx'd with stage IV cancer-is her care taker and transports to all appts, legal issues with adoption and dealing with her daughter who is an addict.   Why chosen therapy is appropriate versus another modality: relevant to diagnosis, patient responds to this modality, evidence based practice  Outcome monitoring methods: self-report, observation, feedback from family  Therapeutic intervention type: insight oriented psychotherapy, behavior modifying psychotherapy, supportive psychotherapy, interactive psychotherapy    Risk parameters:  Patient reports no suicidal ideation  Patient reports no homicidal ideation  Patient reports no self-injurious behavior  Patient reports no violent behavior    Verbal deficits: None    Patient's response to intervention:  The patient's response to intervention is accepting.    Progress toward goals and other mental status changes:  The patient's progress toward goals is  progressing.  .    Diagnosis:     ICD-10-CM  PL            1. Bipolar disorder, current episode mixed, mild F31.61 296.61    2. HUMPHREY (generalized anxiety disorder) F41.1 300.02    3. Acute stress reaction F43.0 308.9    4. Death of family member Z63.4 V62.82    5. Sudden death R99 798.1           Plan:  individual psychotherapy and medication management by physician    Return to clinic: 2 weeks    Length of Service (minutes): 45

## 2023-05-22 ENCOUNTER — OFFICE VISIT (OUTPATIENT)
Dept: PSYCHIATRY | Facility: CLINIC | Age: 63
End: 2023-05-22
Payer: MEDICARE

## 2023-05-22 DIAGNOSIS — F31.61 BIPOLAR DISORDER, CURRENT EPISODE MIXED, MILD: Primary | ICD-10-CM

## 2023-05-22 DIAGNOSIS — F41.1 GAD (GENERALIZED ANXIETY DISORDER): ICD-10-CM

## 2023-05-22 DIAGNOSIS — Z63.9 FAMILY PROBLEMS: ICD-10-CM

## 2023-05-22 PROCEDURE — 99999 PR PBB SHADOW E&M-EST. PATIENT-LVL I: ICD-10-PCS | Mod: PBBFAC,,, | Performed by: SOCIAL WORKER

## 2023-05-22 PROCEDURE — 1159F PR MEDICATION LIST DOCUMENTED IN MEDICAL RECORD: ICD-10-PCS | Mod: CPTII,S$GLB,, | Performed by: SOCIAL WORKER

## 2023-05-22 PROCEDURE — 99211 OFF/OP EST MAY X REQ PHY/QHP: CPT | Performed by: SOCIAL WORKER

## 2023-05-22 PROCEDURE — 1159F MED LIST DOCD IN RCRD: CPT | Mod: CPTII,S$GLB,, | Performed by: SOCIAL WORKER

## 2023-05-22 PROCEDURE — 99999 PR PBB SHADOW E&M-EST. PATIENT-LVL I: CPT | Mod: PBBFAC,,, | Performed by: SOCIAL WORKER

## 2023-05-22 PROCEDURE — 90834 PR PSYCHOTHERAPY W/PATIENT, 45 MIN: ICD-10-PCS | Mod: S$GLB,,, | Performed by: SOCIAL WORKER

## 2023-05-22 PROCEDURE — 90834 PSYTX W PT 45 MINUTES: CPT | Mod: S$GLB,,, | Performed by: SOCIAL WORKER

## 2023-05-22 SDOH — SOCIAL DETERMINANTS OF HEALTH (SDOH): PROBLEM RELATED TO PRIMARY SUPPORT GROUP, UNSPECIFIED: Z63.9

## 2023-05-23 NOTE — PROGRESS NOTES
Individual Psychotherapy (PhD/LCSW)    2023    Site:  [facility]         Therapeutic Intervention: Met with patient.  Outpatient - Insight oriented psychotherapy 45 min - CPT code 81531, Outpatient - Behavior modifying psychotherapy 45 min - CPT code 42369, Outpatient - Supportive psychotherapy 45 min - CPT Code 46490 and Outpatient - Interactive psychotherapy 45 min - CPT code 45200    Chief complaint/reason for encounter: depression, mood swings, anxiety, behavior and interpersonal.     Interval history and content of current session: Pt was last seen a month ago. Pt continues to discuss her new role and adjustment with her grand daughters have finally been transitioned to her niece's home full time.  Pt reports that since she has been back she continues to have her grandchildren over on  and .  Pt reports she has struggled with more depression. Pt continues to express worry about the future and how she is going to manage everything. Pt reports she has felt more manic lately and plans to check in with her treating psychiatrist- Dr. Mack. Pt reports- continues to be very involved with her family overall it is going better.  Pt stated she and her  and continue to stay connected to her close friends.  Insight/Judgement: adequate. Denies any SI/HI, NO A/V/H.   Focus: self care- exploring her anger about her daughter and fear of the future with her 's well being.   Clinician continues to recommended pt to check in sooner with her treating psychiatrist pt presents slightly-manic with pressured rapid speech and racing thoughts.     Treatment plan:  Target symptoms: manic sx's, dis tractability, lack of focus, anxiety, recurrent depression, mood disorder, confusion, adjustment, family stress such as: caring for  and 19-month old babies that were born addicted to drugs,  is blind, sister-in-law dx'd with stage IV cancer-is her care taker and transports to  all appts, legal issues with adoption and dealing with her daughter who is an addict.   Why chosen therapy is appropriate versus another modality: relevant to diagnosis, patient responds to this modality, evidence based practice  Outcome monitoring methods: self-report, observation, feedback from family  Therapeutic intervention type: insight oriented psychotherapy, behavior modifying psychotherapy, supportive psychotherapy, interactive psychotherapy    Risk parameters:  Patient reports no suicidal ideation  Patient reports no homicidal ideation  Patient reports no self-injurious behavior  Patient reports no violent behavior    Verbal deficits: None    Patient's response to intervention:  The patient's response to intervention is accepting.    Progress toward goals and other mental status changes:  The patient's progress toward goals is  progressing.  .    Diagnosis:     ICD-10-CM  PL            1. Bipolar disorder, current episode mixed, mild F31.61 296.61    2. HUMPHREY (generalized anxiety disorder) F41.1 300.02    3. Acute stress reaction F43.0 308.9    4. Death of family member Z63.4 V62.82    5. Sudden death R99 798.1           Plan:  individual psychotherapy and medication management by physician    Return to clinic: 2 weeks    Length of Service (minutes): 45

## 2023-05-31 ENCOUNTER — PATIENT MESSAGE (OUTPATIENT)
Dept: PSYCHIATRY | Facility: CLINIC | Age: 63
End: 2023-05-31
Payer: MEDICARE

## 2023-06-05 ENCOUNTER — OFFICE VISIT (OUTPATIENT)
Dept: PSYCHIATRY | Facility: CLINIC | Age: 63
End: 2023-06-05
Payer: MEDICARE

## 2023-06-05 DIAGNOSIS — F31.0 BIPOLAR AFFECTIVE DISORDER, CURRENT EPISODE HYPOMANIC: ICD-10-CM

## 2023-06-05 DIAGNOSIS — Z63.9 FAMILY PROBLEMS: ICD-10-CM

## 2023-06-05 DIAGNOSIS — F41.1 GAD (GENERALIZED ANXIETY DISORDER): Primary | ICD-10-CM

## 2023-06-05 PROCEDURE — 90834 PR PSYCHOTHERAPY W/PATIENT, 45 MIN: ICD-10-PCS | Mod: 95,,, | Performed by: SOCIAL WORKER

## 2023-06-05 PROCEDURE — 90834 PSYTX W PT 45 MINUTES: CPT | Mod: 95,,, | Performed by: SOCIAL WORKER

## 2023-06-05 SDOH — SOCIAL DETERMINANTS OF HEALTH (SDOH): PROBLEM RELATED TO PRIMARY SUPPORT GROUP, UNSPECIFIED: Z63.9

## 2023-06-05 NOTE — PROGRESS NOTES
Individual Psychotherapy (PhD/LCSW)    6/5/2023    Site:  [facility]         Therapeutic Intervention: Met with patient.  Outpatient - Insight oriented psychotherapy 45 min - CPT code 02254, Outpatient - Behavior modifying psychotherapy 45 min - CPT code 55466, Outpatient - Supportive psychotherapy 45 min - CPT Code 04637 and Outpatient - Interactive psychotherapy 45 min - CPT code 38900    Chief complaint/reason for encounter: depression, mood swings, anxiety, behavior and interpersonal.     Interval history and content of current session: Pt was last seen 2-weeks ago. Pt continues to discuss all of her commitments with her grandchildren & caring for her sister-in-law who is diagnosed with a stage IV cancer and is enrolled in a clinical trial.  Pt reports that since she has been back she continues to have her grandchildren over on Wed afternoons and Saturdays.  Pt reports she has had a medication adjustment and depression symptoms are better regulated. Pt continues to express worry about the future and how she is going to manage everything-caring for her family.  Pt reports she has had less manic sx's-continues to see psychiatrist- Dr. Mack. Pt reports- continues to be very involved with her family overall it is going better.  Pt stated she and her  and continue to stay connected to her close friends.  Insight/Judgement: adequate. Denies any SI/HI, NO A/V/H.   Focus: self care- exploring her anger about her daughter and fear of the future with her 's well being.   Clinician continues to recommended pt to check in sooner with her treating psychiatrist if needed.     Treatment plan:  Target symptoms: distractibility, lack of focus, anxiety, recurrent depression, mood disorder, confusion, adjustment, family stress such as: caring for grandchildren,  is blind, sister-in-law dx'd with stage IV cancer-is her care taker and transports to all appts, legal issues with adoption and dealing with  her daughter who is an addict.   Why chosen therapy is appropriate versus another modality: relevant to diagnosis, patient responds to this modality, evidence based practice  Outcome monitoring methods: self-report, observation, feedback from family  Therapeutic intervention type: insight oriented psychotherapy, behavior modifying psychotherapy, supportive psychotherapy, interactive psychotherapy    Risk parameters:  Patient reports no suicidal ideation  Patient reports no homicidal ideation  Patient reports no self-injurious behavior  Patient reports no violent behavior    Verbal deficits: None    Patient's response to intervention:  The patient's response to intervention is accepting.    Progress toward goals and other mental status changes:  The patient's progress toward goals is  progressing.  .    Diagnosis:     ICD-10-CM  PL            1. Bipolar disorder, current episode mixed, mild F31.61 296.61    2. HUMPHREY (generalized anxiety disorder) F41.1 300.02    3. Acute stress reaction F43.0 308.9    4. Death of family member Z63.4 V62.82    5. Sudden death R99 798.1           Plan:  individual psychotherapy and medication management by physician    Return to clinic: 2 weeks    Length of Service (minutes): 45

## 2023-07-12 ENCOUNTER — HOSPITAL ENCOUNTER (OUTPATIENT)
Dept: RADIOLOGY | Facility: HOSPITAL | Age: 63
Discharge: HOME | End: 2023-07-12
Attending: PHYSICAL MEDICINE & REHABILITATION
Payer: MEDICARE

## 2023-07-12 DIAGNOSIS — Z12.31 ENCOUNTER FOR SCREENING MAMMOGRAM FOR MALIGNANT NEOPLASM OF BREAST: ICD-10-CM

## 2023-07-12 PROCEDURE — 77063 BREAST TOMOSYNTHESIS BI: CPT | Mod: 26,,, | Performed by: RADIOLOGY

## 2023-07-12 PROCEDURE — 77067 MAMMO DIGITAL SCREENING BILAT WITH TOMO: ICD-10-PCS | Mod: 26,,, | Performed by: RADIOLOGY

## 2023-07-12 PROCEDURE — 77063 MAMMO DIGITAL SCREENING BILAT WITH TOMO: ICD-10-PCS | Mod: 26,,, | Performed by: RADIOLOGY

## 2023-07-12 PROCEDURE — 77067 SCR MAMMO BI INCL CAD: CPT | Mod: TC

## 2023-07-12 PROCEDURE — 77067 SCR MAMMO BI INCL CAD: CPT | Mod: 26,,, | Performed by: RADIOLOGY

## 2023-07-17 ENCOUNTER — OFFICE VISIT (OUTPATIENT)
Dept: PSYCHIATRY | Facility: CLINIC | Age: 63
End: 2023-07-17
Payer: MEDICARE

## 2023-07-17 DIAGNOSIS — Z63.9 FAMILY PROBLEMS: ICD-10-CM

## 2023-07-17 DIAGNOSIS — F32.A DEPRESSION, UNSPECIFIED DEPRESSION TYPE: ICD-10-CM

## 2023-07-17 DIAGNOSIS — F41.1 GAD (GENERALIZED ANXIETY DISORDER): ICD-10-CM

## 2023-07-17 DIAGNOSIS — F31.0 BIPOLAR AFFECTIVE DISORDER, CURRENT EPISODE HYPOMANIC: Primary | ICD-10-CM

## 2023-07-17 PROCEDURE — 1159F PR MEDICATION LIST DOCUMENTED IN MEDICAL RECORD: ICD-10-PCS | Mod: CPTII,S$GLB,, | Performed by: SOCIAL WORKER

## 2023-07-17 PROCEDURE — 99999 PR PBB SHADOW E&M-EST. PATIENT-LVL I: ICD-10-PCS | Mod: PBBFAC,,, | Performed by: SOCIAL WORKER

## 2023-07-17 PROCEDURE — 99999 PR PBB SHADOW E&M-EST. PATIENT-LVL I: CPT | Mod: PBBFAC,,, | Performed by: SOCIAL WORKER

## 2023-07-17 PROCEDURE — 1159F MED LIST DOCD IN RCRD: CPT | Mod: CPTII,S$GLB,, | Performed by: SOCIAL WORKER

## 2023-07-17 PROCEDURE — 3044F PR MOST RECENT HEMOGLOBIN A1C LEVEL <7.0%: ICD-10-PCS | Mod: CPTII,S$GLB,, | Performed by: SOCIAL WORKER

## 2023-07-17 PROCEDURE — 3044F HG A1C LEVEL LT 7.0%: CPT | Mod: CPTII,S$GLB,, | Performed by: SOCIAL WORKER

## 2023-07-17 PROCEDURE — 90834 PR PSYCHOTHERAPY W/PATIENT, 45 MIN: ICD-10-PCS | Mod: S$GLB,,, | Performed by: SOCIAL WORKER

## 2023-07-17 PROCEDURE — 90834 PSYTX W PT 45 MINUTES: CPT | Mod: S$GLB,,, | Performed by: SOCIAL WORKER

## 2023-07-17 SDOH — SOCIAL DETERMINANTS OF HEALTH (SDOH): PROBLEM RELATED TO PRIMARY SUPPORT GROUP, UNSPECIFIED: Z63.9

## 2023-07-18 NOTE — PROGRESS NOTES
Individual Psychotherapy (PhD/LCSW)    7/17/2023    Site:  [facility]         Therapeutic Intervention: Met with patient.  Outpatient - Insight oriented psychotherapy 45 min - CPT code 72309, Outpatient - Behavior modifying psychotherapy 45 min - CPT code 06510, Outpatient - Supportive psychotherapy 45 min - CPT Code 64925 and Outpatient - Interactive psychotherapy 45 min - CPT code 79091    Chief complaint/reason for encounter: depression, mood swings, anxiety, behavior and interpersonal.     Interval history and content of current session: Pt was last seen 2-weeks ago. Pt reports she has felt more depressed over the past few weeks and has recently had a medication adjustment with Dr. Mack. Pt reports she has been more withdrawn and has taken a week off from her responsibilities from caring for her grandchildren. Pt reports she has been worried that other's will  her bc she had to take a break from her familial obligations. Pt continues to discuss all of her commitments with her grandchildren & caring for her sister-in-law who is diagnosed with a stage IV cancer and is enrolled in a clinical trial.  Pt reports that since she has been back she continues to have her grandchildren over on Wed afternoons and Saturdays. Pt continues to express worry about the future and how she is going to manage everything-caring for her family.  Pt reports she has had less manic sx's-continues to see psychiatrist- Dr. Mack. Pt reports- continues to be very involved with her family overall it is going better.  Pt stated she and her  and continue to stay connected to her close friends.  Insight/Judgement: adequate. Denies any SI/HI, NO A/V/H.   Focus: self care-managing her depression sx's- exploring her anger about her daughter and fear of the future with her 's well being.        Treatment plan:  Target symptoms: distractibility, lack of focus, anxiety, recurrent depression, mood disorder, confusion,  adjustment, family stress such as: caring for grandchildren,  is blind, sister-in-law dx'd with stage IV cancer-is her care taker and transports to all appts, legal issues with adoption and dealing with her daughter who is an addict.   Why chosen therapy is appropriate versus another modality: relevant to diagnosis, patient responds to this modality, evidence based practice  Outcome monitoring methods: self-report, observation, feedback from family  Therapeutic intervention type: insight oriented psychotherapy, behavior modifying psychotherapy, supportive psychotherapy, interactive psychotherapy    Risk parameters:  Patient reports no suicidal ideation  Patient reports no homicidal ideation  Patient reports no self-injurious behavior  Patient reports no violent behavior    Verbal deficits: None    Patient's response to intervention:  The patient's response to intervention is accepting.    Progress toward goals and other mental status changes:  The patient's progress toward goals is  progressing.  .    Diagnosis:     ICD-10-CM  PL            1. Bipolar disorder, current episode mixed, mild F31.61 296.61    2. HUMPHREY (generalized anxiety disorder) F41.1 300.02    3. Acute stress reaction F43.0 308.9    4. Death of family member Z63.4 V62.82    5. Sudden death R99 798.1           Plan:  individual psychotherapy and medication management by physician    Return to clinic: 2 weeks    Length of Service (minutes): 45

## 2023-08-02 NOTE — PROGRESS NOTES
"  Individual Psychotherapy (PhD/LCSW)    11/14/2022    Site:  [facility]         Therapeutic Intervention: Met with patient.  Outpatient - Insight oriented psychotherapy 45 min - CPT code 48047, Outpatient - Behavior modifying psychotherapy 45 min - CPT code 79740, Outpatient - Supportive psychotherapy 45 min - CPT Code 73034 and Outpatient - Interactive psychotherapy 45 min - CPT code 79710    Chief complaint/reason for encounter: depression, mood swings, anxiety, behavior and interpersonal.     Interval history and content of current session: Pt was last seen 2-weeks ago. Pt continues to discuss her new role and adjustment with her grand daughters have finally been transitioned to her niece's home full time. Pt reports she has been trying to be there more now that her granddaughters have moved and she has more time to help her sister-in-law (Yesica) who has a terminal cancer. Pt continues to report she is struggling with symptoms of depression and worries that this will progress and she is not able to "shut down" as she would normally do.  Pt reviewed a list of concerns with clinician on items she would like to inquire about with her grandchildren and her level of involvement. Pt reports her two grand-daughters (3 yr & 1.5yrs) have been officially transferred and adopted by her niece.  Pt reports to continue to follow Dr. Lourdes Mack for medication mgmt and reports to be keeping up with monthly appts with her treating psychiatrist.  Pt reports she continues to be very involved with her family.  Pt stated she and her  and continue to stay connected to her close friends.  Insight/Judgement: adequate. Denies any SI/HI, NO A/V/H.   Focus: self care- exploring her anger about her daughter and fears her daughter is going to interfere with either adoption.   Clinician recommended pt to check in sooner with her treating psychiatrist pt presents manic with pressured rapid speech and racing thoughts. "     Treatment plan:  Target symptoms: manic sx's, dis tractability, lack of focus, anxiety, recurrent depression, mood disorder, confusion, adjustment, family stress such as: caring for  and 19-month old babies that were born addicted to drugs,  is blind, sister-in-law dx'd with stage IV cancer-is her care taker and transports to all appts, legal issues with adoption and dealing with her daughter who is an addict.   Why chosen therapy is appropriate versus another modality: relevant to diagnosis, patient responds to this modality, evidence based practice  Outcome monitoring methods: self-report, observation, feedback from family  Therapeutic intervention type: insight oriented psychotherapy, behavior modifying psychotherapy, supportive psychotherapy, interactive psychotherapy    Risk parameters:  Patient reports no suicidal ideation  Patient reports no homicidal ideation  Patient reports no self-injurious behavior  Patient reports no violent behavior    Verbal deficits: None    Patient's response to intervention:  The patient's response to intervention is accepting.    Progress toward goals and other mental status changes:  The patient's progress toward goals is  progressing.  .    Diagnosis:     ICD-10-CM  PL            1. Bipolar disorder, current episode mixed, mild F31.61 296.61    2. HUMPHREY (generalized anxiety disorder) F41.1 300.02    3. Acute stress reaction F43.0 308.9    4. Death of family member Z63.4 V62.82    5. Sudden death R99 798.1           Plan:  individual psychotherapy and medication management by physician    Return to clinic: 2 weeks    Length of Service (minutes): 45

## 2023-08-14 ENCOUNTER — OFFICE VISIT (OUTPATIENT)
Dept: PSYCHIATRY | Facility: CLINIC | Age: 63
End: 2023-08-14
Payer: MEDICARE

## 2023-08-14 DIAGNOSIS — F31.0 BIPOLAR AFFECTIVE DISORDER, CURRENT EPISODE HYPOMANIC: Primary | ICD-10-CM

## 2023-08-14 DIAGNOSIS — F41.1 GAD (GENERALIZED ANXIETY DISORDER): ICD-10-CM

## 2023-08-14 DIAGNOSIS — Z63.9 FAMILY PROBLEMS: ICD-10-CM

## 2023-08-14 DIAGNOSIS — F32.A DEPRESSION, UNSPECIFIED DEPRESSION TYPE: ICD-10-CM

## 2023-08-14 PROCEDURE — 90834 PR PSYCHOTHERAPY W/PATIENT, 45 MIN: ICD-10-PCS | Mod: S$GLB,,, | Performed by: SOCIAL WORKER

## 2023-08-14 PROCEDURE — 90834 PSYTX W PT 45 MINUTES: CPT | Mod: S$GLB,,, | Performed by: SOCIAL WORKER

## 2023-08-14 PROCEDURE — 3044F PR MOST RECENT HEMOGLOBIN A1C LEVEL <7.0%: ICD-10-PCS | Mod: CPTII,S$GLB,, | Performed by: SOCIAL WORKER

## 2023-08-14 PROCEDURE — 1159F MED LIST DOCD IN RCRD: CPT | Mod: CPTII,S$GLB,, | Performed by: SOCIAL WORKER

## 2023-08-14 PROCEDURE — 1159F PR MEDICATION LIST DOCUMENTED IN MEDICAL RECORD: ICD-10-PCS | Mod: CPTII,S$GLB,, | Performed by: SOCIAL WORKER

## 2023-08-14 PROCEDURE — 99999 PR PBB SHADOW E&M-EST. PATIENT-LVL I: ICD-10-PCS | Mod: PBBFAC,,, | Performed by: SOCIAL WORKER

## 2023-08-14 PROCEDURE — 99999 PR PBB SHADOW E&M-EST. PATIENT-LVL I: CPT | Mod: PBBFAC,,, | Performed by: SOCIAL WORKER

## 2023-08-14 PROCEDURE — 3044F HG A1C LEVEL LT 7.0%: CPT | Mod: CPTII,S$GLB,, | Performed by: SOCIAL WORKER

## 2023-08-14 SDOH — SOCIAL DETERMINANTS OF HEALTH (SDOH): PROBLEM RELATED TO PRIMARY SUPPORT GROUP, UNSPECIFIED: Z63.9

## 2023-08-14 NOTE — PROGRESS NOTES
Individual Psychotherapy (PhD/LCSW)    8/14/2023    Site:  [facility]         Therapeutic Intervention: Met with patient.  Outpatient - Insight oriented psychotherapy 45 min - CPT code 51484, Outpatient - Behavior modifying psychotherapy 45 min - CPT code 04775, Outpatient - Supportive psychotherapy 45 min - CPT Code 99330 and Outpatient - Interactive psychotherapy 45 min - CPT code 49874    Chief complaint/reason for encounter: depression, mood swings, anxiety, behavior and interpersonal.     Interval history and content of current session: Pt was last seen 1-month ago. Pt reports she has felt better since she has had a medication change. Pt continues to discuss all of her commitments with her grandchildren & caring for her sister-in-law who is diagnosed with a stage IV cancer and is enrolled in a clinical trial.  Pt reports that since she has been back she continues to have her grandchildren over on Wed afternoons and Saturdays. Pt continues to express worry about the future and how she is going to manage everything-caring for her family.  Pt reports she has had less manic sx's-continues to see psychiatrist- Dr. Mack. Pt reports- continues to be very involved with her family overall it is going better.  Pt stated she and her  and continue to stay connected to her close friends.  Insight/Judgement: adequate. Denies any SI/HI, NO A/V/H.   Focus: self care-managing her depression sx's- exploring her anger about her daughter and fear of the future with her 's well being.        Treatment plan:  Target symptoms: distractibility, lack of focus, anxiety, recurrent depression, mood disorder, confusion, adjustment, family stress such as: caring for grandchildren,  is blind, sister-in-law dx'd with stage IV cancer-is her care taker and transports to all appts, legal issues with adoption and dealing with her daughter who is an addict.   Why chosen therapy is appropriate versus another modality:  relevant to diagnosis, patient responds to this modality, evidence based practice  Outcome monitoring methods: self-report, observation, feedback from family  Therapeutic intervention type: insight oriented psychotherapy, behavior modifying psychotherapy, supportive psychotherapy, interactive psychotherapy    Risk parameters:  Patient reports no suicidal ideation  Patient reports no homicidal ideation  Patient reports no self-injurious behavior  Patient reports no violent behavior    Verbal deficits: None    Patient's response to intervention:  The patient's response to intervention is accepting.    Progress toward goals and other mental status changes:  The patient's progress toward goals is  progressing.  .    Diagnosis:     ICD-10-CM  PL            1. Bipolar disorder, current episode mixed, mild F31.61 296.61    2. HUMPHREY (generalized anxiety disorder) F41.1 300.02    3. Acute stress reaction F43.0 308.9    4. Death of family member Z63.4 V62.82    5. Sudden death R99 798.1           Plan:  individual psychotherapy and medication management by physician    Return to clinic: 2 weeks    Length of Service (minutes): 45

## 2023-08-28 ENCOUNTER — OFFICE VISIT (OUTPATIENT)
Dept: PSYCHIATRY | Facility: CLINIC | Age: 63
End: 2023-08-28
Payer: MEDICARE

## 2023-08-28 DIAGNOSIS — F32.A DEPRESSION, UNSPECIFIED DEPRESSION TYPE: ICD-10-CM

## 2023-08-28 DIAGNOSIS — Z63.9 FAMILY PROBLEMS: ICD-10-CM

## 2023-08-28 DIAGNOSIS — F31.0 BIPOLAR AFFECTIVE DISORDER, CURRENT EPISODE HYPOMANIC: Primary | ICD-10-CM

## 2023-08-28 DIAGNOSIS — F41.1 GAD (GENERALIZED ANXIETY DISORDER): ICD-10-CM

## 2023-08-28 PROCEDURE — 1159F PR MEDICATION LIST DOCUMENTED IN MEDICAL RECORD: ICD-10-PCS | Mod: CPTII,S$GLB,, | Performed by: SOCIAL WORKER

## 2023-08-28 PROCEDURE — 90834 PSYTX W PT 45 MINUTES: CPT | Mod: S$GLB,,, | Performed by: SOCIAL WORKER

## 2023-08-28 PROCEDURE — 99999 PR PBB SHADOW E&M-EST. PATIENT-LVL I: ICD-10-PCS | Mod: PBBFAC,,, | Performed by: SOCIAL WORKER

## 2023-08-28 PROCEDURE — 90834 PR PSYCHOTHERAPY W/PATIENT, 45 MIN: ICD-10-PCS | Mod: S$GLB,,, | Performed by: SOCIAL WORKER

## 2023-08-28 PROCEDURE — 3044F PR MOST RECENT HEMOGLOBIN A1C LEVEL <7.0%: ICD-10-PCS | Mod: CPTII,S$GLB,, | Performed by: SOCIAL WORKER

## 2023-08-28 PROCEDURE — 3044F HG A1C LEVEL LT 7.0%: CPT | Mod: CPTII,S$GLB,, | Performed by: SOCIAL WORKER

## 2023-08-28 PROCEDURE — 1159F MED LIST DOCD IN RCRD: CPT | Mod: CPTII,S$GLB,, | Performed by: SOCIAL WORKER

## 2023-08-28 PROCEDURE — 99999 PR PBB SHADOW E&M-EST. PATIENT-LVL I: CPT | Mod: PBBFAC,,, | Performed by: SOCIAL WORKER

## 2023-08-28 SDOH — SOCIAL DETERMINANTS OF HEALTH (SDOH): PROBLEM RELATED TO PRIMARY SUPPORT GROUP, UNSPECIFIED: Z63.9

## 2023-08-29 NOTE — PROGRESS NOTES
Individual Psychotherapy (PhD/LCSW)    8/28/2023    Site:  [facility]         Therapeutic Intervention: Met with patient.  Outpatient - Insight oriented psychotherapy 45 min - CPT code 70882, Outpatient - Behavior modifying psychotherapy 45 min - CPT code 45590, Outpatient - Supportive psychotherapy 45 min - CPT Code 79785 and Outpatient - Interactive psychotherapy 45 min - CPT code 95427    Chief complaint/reason for encounter: depression, mood swings, anxiety, behavior and interpersonal.     Interval history and content of current session: Pt was last seen 1-month ago. Pt presents with her  for the session. Pt reports that her  asked to attend the session to discuss his concerns for her. Pt presents with rapid speech, racing thoughts, lots more energy however has been irritable and more argumentative. Pt's  reports she has been progressively worse over the past 2 weeks or more. Pt's  reports that it has been quite challenging to live with her over the past couple of weeks. Discussed with pt and her  to reach out to her psychiatrist to discuss medication adjustment with recent mood shift. Pt continues to discuss all of her commitments with her grandchildren & caring for her sister-in-law who is diagnosed with a stage IV cancer and is enrolled in a clinical trial. Pt reports she returned back to [employer] a few weeks ago however has not returned bc she is concerned that her [employer] teacher is unhappy with her.     Pt reports that continues to have her grandchildren over on Wed afternoons and Saturdays. Pt was happy to report that both of her grand-daughters were adopted by her niece officially on 8/17. Pt continues to express worry about the future and how she is going to manage everything-caring for her family.  Pt reports she has had less manic sx's-continues to see psychiatrist- Dr. Mack. Pt reports- continues to be very involved with her family overall it is going  better.  Pt stated she and her  and continue to stay connected to her close friends.  Insight/Judgement: adequate. Denies any SI/HI, NO A/V/H.   Focus: self care-managing her depression sx's- exploring her anger about her daughter and fear of the future with her 's well being.        Treatment plan:  Target symptoms: distractibility, lack of focus, anxiety, recurrent depression, mood disorder, confusion, adjustment, family stress such as: caring for grandchildren,  is blind, sister-in-law dx'd with stage IV cancer-is her care taker and transports to all appts, legal issues with adoption and dealing with her daughter who is an addict.   Why chosen therapy is appropriate versus another modality: relevant to diagnosis, patient responds to this modality, evidence based practice  Outcome monitoring methods: self-report, observation, feedback from family  Therapeutic intervention type: insight oriented psychotherapy, behavior modifying psychotherapy, supportive psychotherapy, interactive psychotherapy    Risk parameters:  Patient reports no suicidal ideation  Patient reports no homicidal ideation  Patient reports no self-injurious behavior  Patient reports no violent behavior    Verbal deficits: None    Patient's response to intervention:  The patient's response to intervention is accepting.    Progress toward goals and other mental status changes:  The patient's progress toward goals is  progressing.  .    Diagnosis:     ICD-10-CM  PL            1. Bipolar disorder, current episode mixed, mild F31.61 296.61    2. HUMPHREY (generalized anxiety disorder) F41.1 300.02    3. Acute stress reaction F43.0 308.9    4. Death of family member Z63.4 V62.82    5. Sudden death R99 798.1           Plan:  individual psychotherapy and medication management by physician    Return to clinic: 2 weeks    Length of Service (minutes): 45

## 2023-09-11 ENCOUNTER — OFFICE VISIT (OUTPATIENT)
Dept: PSYCHIATRY | Facility: CLINIC | Age: 63
End: 2023-09-11
Payer: MEDICARE

## 2023-09-11 DIAGNOSIS — F41.1 GAD (GENERALIZED ANXIETY DISORDER): ICD-10-CM

## 2023-09-11 DIAGNOSIS — Z63.9 FAMILY PROBLEMS: ICD-10-CM

## 2023-09-11 DIAGNOSIS — F31.0 BIPOLAR AFFECTIVE DISORDER, CURRENT EPISODE HYPOMANIC: Primary | ICD-10-CM

## 2023-09-11 DIAGNOSIS — F32.A DEPRESSION, UNSPECIFIED DEPRESSION TYPE: ICD-10-CM

## 2023-09-11 PROCEDURE — 99999 PR PBB SHADOW E&M-EST. PATIENT-LVL I: CPT | Mod: PBBFAC,,, | Performed by: SOCIAL WORKER

## 2023-09-11 PROCEDURE — 3044F PR MOST RECENT HEMOGLOBIN A1C LEVEL <7.0%: ICD-10-PCS | Mod: CPTII,S$GLB,, | Performed by: SOCIAL WORKER

## 2023-09-11 PROCEDURE — 1159F MED LIST DOCD IN RCRD: CPT | Mod: CPTII,S$GLB,, | Performed by: SOCIAL WORKER

## 2023-09-11 PROCEDURE — 90837 PR PSYCHOTHERAPY W/PATIENT, 60 MIN: ICD-10-PCS | Mod: S$GLB,,, | Performed by: SOCIAL WORKER

## 2023-09-11 PROCEDURE — 3044F HG A1C LEVEL LT 7.0%: CPT | Mod: CPTII,S$GLB,, | Performed by: SOCIAL WORKER

## 2023-09-11 PROCEDURE — 1159F PR MEDICATION LIST DOCUMENTED IN MEDICAL RECORD: ICD-10-PCS | Mod: CPTII,S$GLB,, | Performed by: SOCIAL WORKER

## 2023-09-11 PROCEDURE — 90837 PSYTX W PT 60 MINUTES: CPT | Mod: S$GLB,,, | Performed by: SOCIAL WORKER

## 2023-09-11 PROCEDURE — 99999 PR PBB SHADOW E&M-EST. PATIENT-LVL I: ICD-10-PCS | Mod: PBBFAC,,, | Performed by: SOCIAL WORKER

## 2023-09-11 SDOH — SOCIAL DETERMINANTS OF HEALTH (SDOH): PROBLEM RELATED TO PRIMARY SUPPORT GROUP, UNSPECIFIED: Z63.9

## 2023-09-11 NOTE — PROGRESS NOTES
Individual Psychotherapy (PhD/LCSW)    9/11/2023    Site:  [facility]         Therapeutic Intervention: Met with patient.  Outpatient - Insight oriented psychotherapy 60 min - CPT code 37004, Outpatient - Behavior modifying psychotherapy 60 min - CPT code 36182, Outpatient - Supportive psychotherapy 60 min - CPT Code 77467 and Outpatient - Interactive psychotherapy 60 min - CPT code 66031    Chief complaint/reason for encounter: depression, mood swings, anxiety, behavior and interpersonal.     Interval history and content of current session: Pt was last seen 2 weeks ago.  Pt reports she has felt calmer presents today with less rapid speech however is still reporting more irritability/agitation toward others particularly with her .  Pt reports she will be seeing Dr. Mack in a few days and has not seen him since her last appt with clinician. Pt reports things are improving at home- pt reports her  has finally decided to work on cleaning out the garage and reorganizing everything. Pt reports she has been working in the house- cleaning out her closets- reorganized her kitchen- the kids play area. Pt also reports they have a security camera system scheduled to be installed. Pt reports she has continued with attending her [employer] class. Pt reports that while it has been a while since she has been out of the practice she is happy that she has returned to her friend group.  Pt discussed recent visits with her grandchildren and has finally decided she would benefit from getting extra help with watching all the kids when they come over especially now that there are two additional cousins that are close in age who also come over.   Pt continues to discuss all of her commitments with her grandchildren & caring for her sister-in-law who is diagnosed with a stage IV cancer and is enrolled in a clinical trial.      Pt reports that continues to have her grandchildren over on Wed afternoons and Saturdays. Pt  was happy to report that both of her grand-daughters were adopted by her niece officially on 8/17. Pt continues to express worry about the future and how she is going to manage everything-caring for her family.  Pt reports she has had less manic sx's-continues to see psychiatrist- Dr. Mack. Pt reports- continues to be very involved with her family overall it is going better.  Pt stated she and her  and continue to stay connected to her close friends.  Insight/Judgement: adequate. Denies any SI/HI, NO A/V/H.   Focus: self care-managing her depression sx's- exploring her anger about her daughter and fear of the future with her 's well being.        Treatment plan:  Target symptoms: distractibility, lack of focus, anxiety, recurrent depression, mood disorder, confusion, adjustment, family stress such as: caring for grandchildren,  is blind, sister-in-law dx'd with stage IV cancer-is her care taker and transports to all appts, legal issues with adoption and dealing with her daughter who is an addict.   Why chosen therapy is appropriate versus another modality: relevant to diagnosis, patient responds to this modality, evidence based practice  Outcome monitoring methods: self-report, observation, feedback from family  Therapeutic intervention type: insight oriented psychotherapy, behavior modifying psychotherapy, supportive psychotherapy, interactive psychotherapy    Risk parameters:  Patient reports no suicidal ideation  Patient reports no homicidal ideation  Patient reports no self-injurious behavior  Patient reports no violent behavior    Verbal deficits: None    Patient's response to intervention:  The patient's response to intervention is accepting.    Progress toward goals and other mental status changes:  The patient's progress toward goals is  progressing.  .    Diagnosis:     ICD-10-CM  PL            1. Bipolar disorder, current episode mixed, mild F31.61 296.61    2. HUMPHREY (generalized  anxiety disorder) F41.1 300.02    3. Acute stress reaction F43.0 308.9    4. Death of family member Z63.4 V62.82    5. Sudden death R99 798.1           Plan:  individual psychotherapy and medication management by physician    Return to clinic: 2 weeks    Length of Service (minutes): 45

## 2023-09-25 ENCOUNTER — OFFICE VISIT (OUTPATIENT)
Dept: PSYCHIATRY | Facility: CLINIC | Age: 63
End: 2023-09-25
Payer: MEDICARE

## 2023-09-25 DIAGNOSIS — F41.1 GAD (GENERALIZED ANXIETY DISORDER): ICD-10-CM

## 2023-09-25 DIAGNOSIS — F32.A DEPRESSION, UNSPECIFIED DEPRESSION TYPE: ICD-10-CM

## 2023-09-25 DIAGNOSIS — Z63.9 FAMILY PROBLEMS: ICD-10-CM

## 2023-09-25 DIAGNOSIS — F31.0 BIPOLAR AFFECTIVE DISORDER, CURRENT EPISODE HYPOMANIC: Primary | ICD-10-CM

## 2023-09-25 PROCEDURE — 1159F PR MEDICATION LIST DOCUMENTED IN MEDICAL RECORD: ICD-10-PCS | Mod: CPTII,S$GLB,, | Performed by: SOCIAL WORKER

## 2023-09-25 PROCEDURE — 90837 PR PSYCHOTHERAPY W/PATIENT, 60 MIN: ICD-10-PCS | Mod: S$GLB,,, | Performed by: SOCIAL WORKER

## 2023-09-25 PROCEDURE — 3044F PR MOST RECENT HEMOGLOBIN A1C LEVEL <7.0%: ICD-10-PCS | Mod: CPTII,S$GLB,, | Performed by: SOCIAL WORKER

## 2023-09-25 PROCEDURE — 90837 PSYTX W PT 60 MINUTES: CPT | Mod: S$GLB,,, | Performed by: SOCIAL WORKER

## 2023-09-25 PROCEDURE — 99999 PR PBB SHADOW E&M-EST. PATIENT-LVL I: CPT | Mod: PBBFAC,,, | Performed by: SOCIAL WORKER

## 2023-09-25 PROCEDURE — 3044F HG A1C LEVEL LT 7.0%: CPT | Mod: CPTII,S$GLB,, | Performed by: SOCIAL WORKER

## 2023-09-25 PROCEDURE — 99999 PR PBB SHADOW E&M-EST. PATIENT-LVL I: ICD-10-PCS | Mod: PBBFAC,,, | Performed by: SOCIAL WORKER

## 2023-09-25 PROCEDURE — 1159F MED LIST DOCD IN RCRD: CPT | Mod: CPTII,S$GLB,, | Performed by: SOCIAL WORKER

## 2023-09-25 SDOH — SOCIAL DETERMINANTS OF HEALTH (SDOH): PROBLEM RELATED TO PRIMARY SUPPORT GROUP, UNSPECIFIED: Z63.9

## 2023-10-23 ENCOUNTER — OFFICE VISIT (OUTPATIENT)
Dept: PSYCHIATRY | Facility: CLINIC | Age: 63
End: 2023-10-23
Payer: MEDICARE

## 2023-10-23 DIAGNOSIS — F32.A DEPRESSION, UNSPECIFIED DEPRESSION TYPE: ICD-10-CM

## 2023-10-23 DIAGNOSIS — F31.0 BIPOLAR AFFECTIVE DISORDER, CURRENT EPISODE HYPOMANIC: Primary | ICD-10-CM

## 2023-10-23 DIAGNOSIS — Z63.9 FAMILY PROBLEMS: ICD-10-CM

## 2023-10-23 DIAGNOSIS — F41.1 GAD (GENERALIZED ANXIETY DISORDER): ICD-10-CM

## 2023-10-23 PROCEDURE — 90847 FAMILY PSYTX W/PT 50 MIN: CPT | Mod: S$GLB,,, | Performed by: SOCIAL WORKER

## 2023-10-23 PROCEDURE — 3044F PR MOST RECENT HEMOGLOBIN A1C LEVEL <7.0%: ICD-10-PCS | Mod: CPTII,S$GLB,, | Performed by: SOCIAL WORKER

## 2023-10-23 PROCEDURE — 90847 PR FAMILY PSYCHOTHERAPY W/ PT, 50 MIN: ICD-10-PCS | Mod: S$GLB,,, | Performed by: SOCIAL WORKER

## 2023-10-23 PROCEDURE — 3044F HG A1C LEVEL LT 7.0%: CPT | Mod: CPTII,S$GLB,, | Performed by: SOCIAL WORKER

## 2023-10-23 SDOH — SOCIAL DETERMINANTS OF HEALTH (SDOH): PROBLEM RELATED TO PRIMARY SUPPORT GROUP, UNSPECIFIED: Z63.9

## 2023-10-23 NOTE — PROGRESS NOTES
Individual Psychotherapy (PhD/LCSW)    9/25/2023    Site:  [facility]         Therapeutic Intervention: Met with patient.  Outpatient - Insight oriented psychotherapy 60 min - CPT code 27635, Outpatient - Behavior modifying psychotherapy 60 min - CPT code 67294, Outpatient - Supportive psychotherapy 60 min - CPT Code 11705 and Outpatient - Interactive psychotherapy 60 min - CPT code 86138    Chief complaint/reason for encounter: depression, mood swings, anxiety, behavior and interpersonal.     Interval history and content of current session: Pt was last seen 2 weeks ago.  Pt continues to report she has felt calmer presents today with less rapid speech however is still reporting more irritability/agitation toward others particularly with her .  Pt reports she will be seeing Dr. Mack in a few days and has not seen him since her last appt with clinician. Pt continues to report things are improving at home- pt reports her  has finally decided to work on cleaning out the garage and reorganizing everything. Pt continues to report she has been working in the house- cleaning out her closets- reorganized her kitchen- the kids play area. Pt also reports they have a security camera system scheduled to be installed. Pt reports she has continued with attending her [employer] class. Pt reports that while it has been a while since she has been out of the practice she is happy that she has returned to her friend group.  Pt discussed recent visits with her grandchildren and has finally decided she would benefit from getting extra help with watching all the kids when they come over especially now that there are two additional cousins that are close in age who also come over.   Pt continues to discuss all of her commitments with her grandchildren & caring for her sister-in-law who is diagnosed with a stage IV cancer and is enrolled in a clinical trial.      Pt reports that continues to have her grandchildren over  on Wed afternoons and Saturdays. Pt was happy to report that both of her grand-daughters were adopted by her niece officially on 8/17. Pt continues to express worry about the future and how she is going to manage everything-caring for her family.  Pt reports she has had less manic sx's-continues to see psychiatrist- Dr. Mack. Pt reports- continues to be very involved with her family overall it is going better.  Pt stated she and her  and continue to stay connected to her close friends.  Insight/Judgement: adequate. Denies any SI/HI, NO A/V/H.   Focus: self care-managing her depression sx's- exploring her anger about her daughter and fear of the future with her 's well being.        Treatment plan:  Target symptoms: distractibility, lack of focus, anxiety, recurrent depression, mood disorder, confusion, adjustment, family stress such as: caring for grandchildren,  is blind, sister-in-law dx'd with stage IV cancer-is her care taker and transports to all appts, legal issues with adoption and dealing with her daughter who is an addict.   Why chosen therapy is appropriate versus another modality: relevant to diagnosis, patient responds to this modality, evidence based practice  Outcome monitoring methods: self-report, observation, feedback from family  Therapeutic intervention type: insight oriented psychotherapy, behavior modifying psychotherapy, supportive psychotherapy, interactive psychotherapy    Risk parameters:  Patient reports no suicidal ideation  Patient reports no homicidal ideation  Patient reports no self-injurious behavior  Patient reports no violent behavior    Verbal deficits: None    Patient's response to intervention:  The patient's response to intervention is accepting.    Progress toward goals and other mental status changes:  The patient's progress toward goals is  progressing.  .    Diagnosis:     ICD-10-CM  PL            1. Bipolar disorder, current episode mixed, mild F31.61  296.61    2. HUMPHREY (generalized anxiety disorder) F41.1 300.02    3. Acute stress reaction F43.0 308.9    4. Death of family member Z63.4 V62.82    5. Sudden death R99 798.1           Plan:  individual psychotherapy and medication management by physician    Return to clinic: 2 weeks    Length of Service (minutes): 50

## 2023-10-24 NOTE — PROGRESS NOTES
"  Individual Psychotherapy (PhD/LCSW)    10/23/2023    Site:  [facility]         Therapeutic Intervention: Met with patient.  Outpatient - Insight oriented psychotherapy 60 min - CPT code 83304, Outpatient - Behavior modifying psychotherapy 60 min - CPT code 45997, Outpatient - Supportive psychotherapy 60 min - CPT Code 11000 and Outpatient - Interactive psychotherapy 60 min - CPT code 07422    Chief complaint/reason for encounter: depression, mood swings, anxiety, behavior and interpersonal.     Interval history and content of current session: Pt was last seen 3 weeks ago.  Pt continues to report she has felt calmer presents today with less rapid speech however is still reporting more irritability/agitation toward others particularly with her .  Pt reports she will be seeing Dr. Mack tomorrow.  Since her last med adjustment pt has not been able to manage hypo-manic state this is confirmed by pt's . Pt presents in session today- with the following: rapid/pressure speech- very restless- racing [id number].  Pt reports she is worried what changes will occur with her medication bc she does not want to be a "zombie".  Pt is worried that Dr. Mack will give her something that will take away her energy. Pt has limited insight or perspective on her current state and how it is a challenge to be around her (according to her ). Pt has been more isolated has not gone to [employer] or spend time with friends. Pt continues to see grandchildren however it has been challenging.     Pt discussed recent visits with her grandchildren and has finally decided she would benefit from getting extra help with watching all the kids when they come over especially now that there are two additional cousins that are close in age who also come over.   Pt continues to discuss all of her commitments with her grandchildren & caring for her sister-in-law who is diagnosed with a stage IV " cancer and is enrolled in a clinical trial.      Pt reports that continues to have her grandchildren over on Tues afternoons and Saturdays.  continues to express worry about the future and how she is going to manage everything-caring for her family.  Pt reports she has had less manic sx's-continues to see psychiatrist- Dr. Mack. Pt reports- continues to be very involved with her family overall it is going better.  Pt stated she and her  and continue to stay connected to her close friends.  Insight/Judgement: adequate. Denies any SI/HI, NO A/V/H.   Focus: self care-managing her depression sx's- exploring her anger about her daughter and fear of the future with her 's well being.        Treatment plan:  Target symptoms: distractibility, lack of focus, anxiety, recurrent depression, mood disorder, confusion, adjustment, family stress such as: caring for grandchildren,  is blind, sister-in-law dx'd with stage IV cancer-is her care taker and transports to all appts, legal issues with adoption and dealing with her daughter who is an addict.   Why chosen therapy is appropriate versus another modality: relevant to diagnosis, patient responds to this modality, evidence based practice  Outcome monitoring methods: self-report, observation, feedback from family  Therapeutic intervention type: insight oriented psychotherapy, behavior modifying psychotherapy, supportive psychotherapy, interactive psychotherapy    Risk parameters:  Patient reports no suicidal ideation  Patient reports no homicidal ideation  Patient reports no self-injurious behavior  Patient reports no violent behavior    Verbal deficits: None    Patient's response to intervention:  The patient's response to intervention is accepting.    Progress toward goals and other mental status changes:  The patient's progress toward goals is  progressing.  .    Diagnosis:     ICD-10-CM  PL            1. Bipolar disorder, current episode mixed, mild  F31.61 296.61    2. HUMPHREY (generalized anxiety disorder) F41.1 300.02    3. Acute stress reaction F43.0 308.9    4. Death of family member Z63.4 V62.82    5. Sudden death R99 798.1           Plan:  individual psychotherapy and medication management by physician    Return to clinic: 2 weeks    Length of Service (minutes): 50

## 2023-12-04 ENCOUNTER — OFFICE VISIT (OUTPATIENT)
Dept: PSYCHIATRY | Facility: CLINIC | Age: 63
End: 2023-12-04
Payer: MEDICARE

## 2023-12-04 DIAGNOSIS — F31.0 BIPOLAR AFFECTIVE DISORDER, CURRENT EPISODE HYPOMANIC: Primary | ICD-10-CM

## 2023-12-04 DIAGNOSIS — F32.A DEPRESSION, UNSPECIFIED DEPRESSION TYPE: ICD-10-CM

## 2023-12-04 DIAGNOSIS — F41.1 GAD (GENERALIZED ANXIETY DISORDER): ICD-10-CM

## 2023-12-04 DIAGNOSIS — Z63.9 FAMILY PROBLEMS: ICD-10-CM

## 2023-12-04 PROCEDURE — 90837 PSYTX W PT 60 MINUTES: CPT | Mod: S$GLB,,, | Performed by: SOCIAL WORKER

## 2023-12-04 SDOH — SOCIAL DETERMINANTS OF HEALTH (SDOH): PROBLEM RELATED TO PRIMARY SUPPORT GROUP, UNSPECIFIED: Z63.9

## 2023-12-18 ENCOUNTER — OFFICE VISIT (OUTPATIENT)
Dept: PSYCHIATRY | Facility: CLINIC | Age: 63
End: 2023-12-18
Payer: MEDICARE

## 2023-12-18 DIAGNOSIS — F31.0 BIPOLAR AFFECTIVE DISORDER, CURRENT EPISODE HYPOMANIC: Primary | ICD-10-CM

## 2023-12-18 DIAGNOSIS — F41.1 GAD (GENERALIZED ANXIETY DISORDER): ICD-10-CM

## 2023-12-18 DIAGNOSIS — Z63.9 FAMILY PROBLEMS: ICD-10-CM

## 2023-12-18 PROCEDURE — 90837 PSYTX W PT 60 MINUTES: CPT | Mod: S$GLB,,, | Performed by: SOCIAL WORKER

## 2023-12-18 PROCEDURE — 1159F MED LIST DOCD IN RCRD: CPT | Mod: CPTII,S$GLB,, | Performed by: SOCIAL WORKER

## 2023-12-18 PROCEDURE — 99999 PR PBB SHADOW E&M-EST. PATIENT-LVL I: CPT | Mod: PBBFAC,,, | Performed by: SOCIAL WORKER

## 2023-12-18 SDOH — SOCIAL DETERMINANTS OF HEALTH (SDOH): PROBLEM RELATED TO PRIMARY SUPPORT GROUP, UNSPECIFIED: Z63.9

## 2023-12-18 NOTE — PROGRESS NOTES
"  Individual Psychotherapy (PhD/LCSW)    12/18/2023    Site:  [facility]         Therapeutic Intervention: Met with patient.  Outpatient - Insight oriented psychotherapy 60 min - CPT code 62276, Outpatient - Behavior modifying psychotherapy 60 min - CPT code 52372, Outpatient - Supportive psychotherapy 60 min - CPT Code 39618 and Outpatient - Interactive psychotherapy 60 min - CPT code 89643    Chief complaint/reason for encounter: depression, mood swings, anxiety, behavior and interpersonal.     Interval history and content of current session: Pt was last seen 2 weeks ago.  Pt reports she has had another med change is off recent med (Vraylar) pt reports having terrible side effects and did not to start another new medication during the businest.  Pt reports she is worried what changes will occur with her medication bc she does not want to be a "zombie".  Pt is worried that Dr. Mack will give her something that will take away her energy. Pt has limited insight or perspective on her current state and how it is a challenge to be around her (according to her ). Pt has been more isolated has not gone to [employer] or spend time with friends. Pt continues to see grandchildren however it has been challenging.     Pt discussed recent visits with her grandchildren and has finally decided she would benefit from getting extra help with watching all the kids when they come over especially now that there are two additional cousins that are close in age who also come over.   Pt continues to discuss all of her commitments with her grandchildren & caring for her sister-in-law who is diagnosed with a stage IV cancer and is enrolled in a clinical trial.      Pt reports that continues to have her grandchildren over on Tues afternoons and Saturdays.  continues to express worry about the future and how she is going to manage everything-caring for her family.  Pt reports she has had less manic sx's-continues to see " psychiatrist- Dr. Mack. Pt reports- continues to be very involved with her family overall it is going better.  Pt stated she and her  and continue to stay connected to her close friends.  Insight/Judgement: adequate. Denies any SI/HI, NO A/V/H.   Focus: self care-managing her depression sx's- exploring her anger about her daughter and fear of the future with her 's well being.        Treatment plan:  Target symptoms: distractibility, lack of focus, anxiety, recurrent depression, mood disorder, confusion, adjustment, family stress such as: caring for grandchildren,  is blind, sister-in-law dx'd with stage IV cancer-is her care taker and transports to all appts, legal issues with adoption and dealing with her daughter who is an addict.   Why chosen therapy is appropriate versus another modality: relevant to diagnosis, patient responds to this modality, evidence based practice  Outcome monitoring methods: self-report, observation, feedback from family  Therapeutic intervention type: insight oriented psychotherapy, behavior modifying psychotherapy, supportive psychotherapy, interactive psychotherapy    Risk parameters:  Patient reports no suicidal ideation  Patient reports no homicidal ideation  Patient reports no self-injurious behavior  Patient reports no violent behavior    Verbal deficits: None    Patient's response to intervention:  The patient's response to intervention is accepting.    Progress toward goals and other mental status changes:  The patient's progress toward goals is  progressing.  .    Diagnosis:     ICD-10-CM  PL            1. Bipolar disorder, current episode mixed, mild F31.61 296.61    2. HUMPHREY (generalized anxiety disorder) F41.1 300.02    3. Acute stress reaction F43.0 308.9    4. Death of family member Z63.4 V62.82    5. Sudden death R99 798.1           Plan:  individual psychotherapy and medication management by physician    Return to clinic: 2 weeks    Length of Service  (minutes): 50

## 2024-01-01 NOTE — PROGRESS NOTES
"  Individual Psychotherapy (PhD/LCSW)    12/4/2023    Site:  [facility]         Therapeutic Intervention: Met with patient.  Outpatient - Insight oriented psychotherapy 60 min - CPT code 65323, Outpatient - Behavior modifying psychotherapy 60 min - CPT code 66592, Outpatient - Supportive psychotherapy 60 min - CPT Code 22555 and Outpatient - Interactive psychotherapy 60 min - CPT code 46990    Chief complaint/reason for encounter: depression, mood swings, anxiety, behavior and interpersonal.     Interval history and content of current session: Pt was last seen 3 weeks ago. Pt presents agitated and restless with rapid speech. Pt was tearful and stated she has not been able to manage hypo-manic state this continues to be confirmed by pt's .  Pt reports she is worried what changes will occur with her medication bc she does not want to be a "zombie".  Pt reports she is very concerned how she is going to handle [place].  Pt signed a consent release for this clinician to speak to her treating psychiatrist (Dr. Mack).  Pt discussed recent visits with her grandchildren and has finally decided she would benefit from getting extra help with watching all the kids when they come over especially now that there are two additional cousins that are close in age who also come over.   Pt continues to discuss all of her commitments with her grandchildren & caring for her sister-in-law who is diagnosed with a stage IV cancer and is enrolled in a clinical trial.      Pt reports that continues to have her grandchildren over on Tues afternoons and Saturdays.  continues to express worry about the future and how she is going to manage everything-caring for her family.  Pt reports she has had less manic sx's-continues to see psychiatrist- Dr. Mack. Pt reports- continues to be very involved with her family overall it is going better.  Pt stated she and her  and continue to stay connected to her close friends.  " Insight/Judgement: adequate. Denies any SI/HI, NO A/V/H.   Focus: self care-managing her hypo-darin- stablize her mood- regulate her anxiety sx's- identify ways to cope better with the stress of the holidays- follow up with Dr. Mack to address mood instability that.        Treatment plan:  Target symptoms: distractibility, lack of focus, anxiety, recurrent depression, mood disorder, confusion, adjustment, family stress such as: caring for grandchildren,  is blind, sister-in-law dx'd with stage IV cancer-is her care taker and transports to all appts, legal issues with adoption and dealing with her daughter who is an addict.   Why chosen therapy is appropriate versus another modality: relevant to diagnosis, patient responds to this modality, evidence based practice  Outcome monitoring methods: self-report, observation, feedback from family  Therapeutic intervention type: insight oriented psychotherapy, behavior modifying psychotherapy, supportive psychotherapy, interactive psychotherapy    Risk parameters:  Patient reports no suicidal ideation  Patient reports no homicidal ideation  Patient reports no self-injurious behavior  Patient reports no violent behavior    Verbal deficits: None    Patient's response to intervention:  The patient's response to intervention is accepting.    Progress toward goals and other mental status changes:  The patient's progress toward goals is  progressing.  .    Diagnosis:     ICD-10-CM  PL            1. Bipolar disorder, current episode mixed, mild F31.61 296.61    2. HUMPHREY (generalized anxiety disorder) F41.1 300.02    3. Acute stress reaction F43.0 308.9    4. Death of family member Z63.4 V62.82    5. Sudden death R99 798.1           Plan:  individual psychotherapy and medication management by physician    Return to clinic: 2 weeks    Length of Service (minutes): 50

## 2024-01-08 ENCOUNTER — OFFICE VISIT (OUTPATIENT)
Dept: PSYCHIATRY | Facility: CLINIC | Age: 64
End: 2024-01-08
Payer: MEDICARE

## 2024-01-08 DIAGNOSIS — F41.1 GAD (GENERALIZED ANXIETY DISORDER): ICD-10-CM

## 2024-01-08 DIAGNOSIS — F32.A DEPRESSION, UNSPECIFIED DEPRESSION TYPE: ICD-10-CM

## 2024-01-08 DIAGNOSIS — F31.0 BIPOLAR AFFECTIVE DISORDER, CURRENT EPISODE HYPOMANIC: Primary | ICD-10-CM

## 2024-01-08 DIAGNOSIS — Z63.9 FAMILY PROBLEMS: ICD-10-CM

## 2024-01-08 PROCEDURE — 1159F MED LIST DOCD IN RCRD: CPT | Mod: CPTII,S$GLB,, | Performed by: SOCIAL WORKER

## 2024-01-08 PROCEDURE — 90837 PSYTX W PT 60 MINUTES: CPT | Mod: S$GLB,,, | Performed by: SOCIAL WORKER

## 2024-01-08 PROCEDURE — 99999 PR PBB SHADOW E&M-EST. PATIENT-LVL I: CPT | Mod: PBBFAC,,, | Performed by: SOCIAL WORKER

## 2024-01-08 SDOH — SOCIAL DETERMINANTS OF HEALTH (SDOH): PROBLEM RELATED TO PRIMARY SUPPORT GROUP, UNSPECIFIED: Z63.9

## 2024-01-22 ENCOUNTER — OFFICE VISIT (OUTPATIENT)
Dept: PSYCHIATRY | Facility: CLINIC | Age: 64
End: 2024-01-22
Payer: MEDICARE

## 2024-01-22 DIAGNOSIS — F31.0 BIPOLAR AFFECTIVE DISORDER, CURRENT EPISODE HYPOMANIC: Primary | ICD-10-CM

## 2024-01-22 DIAGNOSIS — F32.A DEPRESSION, UNSPECIFIED DEPRESSION TYPE: ICD-10-CM

## 2024-01-22 DIAGNOSIS — Z63.9 FAMILY PROBLEMS: ICD-10-CM

## 2024-01-22 DIAGNOSIS — F41.1 GAD (GENERALIZED ANXIETY DISORDER): ICD-10-CM

## 2024-01-22 PROCEDURE — 90837 PSYTX W PT 60 MINUTES: CPT | Mod: S$GLB,,, | Performed by: SOCIAL WORKER

## 2024-01-22 PROCEDURE — 99999 PR PBB SHADOW E&M-EST. PATIENT-LVL I: CPT | Mod: PBBFAC,,, | Performed by: SOCIAL WORKER

## 2024-01-22 PROCEDURE — 1159F MED LIST DOCD IN RCRD: CPT | Mod: CPTII,S$GLB,, | Performed by: SOCIAL WORKER

## 2024-01-22 SDOH — SOCIAL DETERMINANTS OF HEALTH (SDOH): PROBLEM RELATED TO PRIMARY SUPPORT GROUP, UNSPECIFIED: Z63.9

## 2024-01-25 NOTE — PROGRESS NOTES
Individual Psychotherapy (PhD/LCSW)    1/8/2024    Site:  [facility]         Therapeutic Intervention: Met with patient.  Outpatient - Insight oriented psychotherapy 60 min - CPT code 08437, Outpatient - Behavior modifying psychotherapy 60 min - CPT code 16011, Outpatient - Supportive psychotherapy 60 min - CPT Code 51961 and Outpatient - Interactive psychotherapy 60 min - CPT code 48143    Chief complaint/reason for encounter: depression, mood swings, anxiety, behavior and interpersonal.     Interval history and content of current session: Pt was last seen 2 weeks ago.  Pt continues to report not feeling better and continues to work with her psychiatrist. Pt reports she managed to get through the holidays.  s. Pt has limited insight or perspective on her current state and how it is a challenge to be around her (according to her ). Pt has been more isolated has not gone to [employer] or spend time with friends. Pt continues to see grandchildren however it has been challenging.   co  Pt discussed recent visits with her grandchildren and has finally decided she would benefit from getting extra help with watching all the kids when they come over especially now that there are two additional cousins that are close in age who also come over.   Pt continues to discuss all of her commitments with her grandchildren & caring for her sister-in-law who is diagnosed with a stage IV cancer and is enrolled in a clinical trial.      Pt reports that continues to have her grandchildren over on Tues afternoons and Saturdays.  continues to express worry about the future and how she is going to manage everything-caring for her family.  Pt reports she has had less manic sx's-continues to see psychiatrist- Dr. Mack. Pt reports- continues to be very involved with her family overall it is going better.  Pt stated she and her  and continue to stay connected to her close friends.  Insight/Judgement: adequate.  Denies any SI/HI, NO A/V/H.   Focus: self care-managing her depression sx's- exploring her anger about her daughter and fear of the future with her 's well being.        Treatment plan:  Target symptoms: distractibility, lack of focus, anxiety, recurrent depression, mood disorder, confusion, adjustment, family stress such as: caring for grandchildren,  is blind, sister-in-law dx'd with stage IV cancer-is her care taker and transports to all appts, legal issues with adoption and dealing with her daughter who is an addict.   Why chosen therapy is appropriate versus another modality: relevant to diagnosis, patient responds to this modality, evidence based practice  Outcome monitoring methods: self-report, observation, feedback from family  Therapeutic intervention type: insight oriented psychotherapy, behavior modifying psychotherapy, supportive psychotherapy, interactive psychotherapy    Risk parameters:  Patient reports no suicidal ideation  Patient reports no homicidal ideation  Patient reports no self-injurious behavior  Patient reports no violent behavior    Verbal deficits: None    Patient's response to intervention:  The patient's response to intervention is accepting.    Progress toward goals and other mental status changes:  The patient's progress toward goals is  progressing.  .    Diagnosis:     ICD-10-CM  PL            1. Bipolar disorder, current episode mixed, mild F31.61 296.61    2. HUMPHREY (generalized anxiety disorder) F41.1 300.02    3. Acute stress reaction F43.0 308.9    4. Death of family member Z63.4 V62.82    5. Sudden death R99 798.1           Plan:  individual psychotherapy and medication management by physician    Return to clinic: 2 weeks    Length of Service (minutes): 60

## 2024-01-30 NOTE — PROGRESS NOTES
Individual Psychotherapy (PhD/LCSW)    1/22/2024    Site:  [facility]         Therapeutic Intervention: Met with patient.  Outpatient - Insight oriented psychotherapy 60 min - CPT code 37418, Outpatient - Behavior modifying psychotherapy 60 min - CPT code 20796, Outpatient - Supportive psychotherapy 60 min - CPT Code 25988 and Outpatient - Interactive psychotherapy 60 min - CPT code 90706    Chief complaint/reason for encounter: depression, mood swings, anxiety, behavior and interpersonal.     Interval history and content of current session: Pt was last seen 2 weeks ago.  Pt reports she is starting to feel better and it is visibly noticeable pt presents calm with relaxed affect- reports she is sleeping okay- less agitation- no pressured speech. Pt continues to work with her psychiatrist. Pt discussed getting through the holidays- challenges with her state and dealing with her grandchildren- pt reports everything went okay for her.  Pt reports she is feeling more optimistic and hopeful things will get better for her.  Pt reports she plans to return to [employer] or spend time with friends. Pt continues to see grandchildren.     Pt discussed recent visits with her grandchildren and has finally decided she would benefit from getting extra help with watching all the kids when they come over especially now that there are two additional cousins that are close in age who also come over.   Pt continues to discuss all of her commitments with her grandchildren & caring for her sister-in-law who is diagnosed with a stage IV cancer and is enrolled in a clinical trial.      Pt reports that continues to have her grandchildren over on Tues afternoons and Saturdays.  continues to express worry about the future and how she is going to manage everything-caring for her family.  Pt reports she has had less manic sx's-continues to see psychiatrist- Dr. Mack. Pt reports- continues to be very involved with her family overall it is  going better.  Pt stated she and her  and continue to stay connected to her close friends.  Insight/Judgement: adequate. Denies any SI/HI, NO A/V/H.   Focus: self care-managing her depression sx's- exploring her anger about her daughter and fear of the future with her 's well being.        Treatment plan:  Target symptoms: distractibility, lack of focus, anxiety, recurrent depression, mood disorder, confusion, adjustment, family stress such as: caring for grandchildren,  is blind, sister-in-law dx'd with stage IV cancer-is her care taker and transports to all appts, legal issues with adoption and dealing with her daughter who is an addict.   Why chosen therapy is appropriate versus another modality: relevant to diagnosis, patient responds to this modality, evidence based practice  Outcome monitoring methods: self-report, observation, feedback from family  Therapeutic intervention type: insight oriented psychotherapy, behavior modifying psychotherapy, supportive psychotherapy, interactive psychotherapy    Risk parameters:  Patient reports no suicidal ideation  Patient reports no homicidal ideation  Patient reports no self-injurious behavior  Patient reports no violent behavior    Verbal deficits: None    Patient's response to intervention:  The patient's response to intervention is accepting.    Progress toward goals and other mental status changes:  The patient's progress toward goals is  progressing.  .    Diagnosis:     ICD-10-CM  PL            1. Bipolar disorder, current episode mixed, mild F31.61 296.61    2. HUMPHREY (generalized anxiety disorder) F41.1 300.02    3. Acute stress reaction F43.0 308.9    4. Death of family member Z63.4 V62.82    5. Sudden death R99 798.1           Plan:  individual psychotherapy and medication management by physician    Return to clinic: 2 weeks    Length of Service (minutes): 60

## 2024-02-05 ENCOUNTER — OFFICE VISIT (OUTPATIENT)
Dept: PSYCHIATRY | Facility: CLINIC | Age: 64
End: 2024-02-05
Payer: MEDICARE

## 2024-02-05 DIAGNOSIS — F32.A DEPRESSION, UNSPECIFIED DEPRESSION TYPE: ICD-10-CM

## 2024-02-05 DIAGNOSIS — Z63.9 FAMILY PROBLEMS: ICD-10-CM

## 2024-02-05 DIAGNOSIS — F31.0 BIPOLAR AFFECTIVE DISORDER, CURRENT EPISODE HYPOMANIC: Primary | ICD-10-CM

## 2024-02-05 DIAGNOSIS — F41.1 GAD (GENERALIZED ANXIETY DISORDER): ICD-10-CM

## 2024-02-05 PROCEDURE — 1159F MED LIST DOCD IN RCRD: CPT | Mod: CPTII,S$GLB,, | Performed by: SOCIAL WORKER

## 2024-02-05 PROCEDURE — 90837 PSYTX W PT 60 MINUTES: CPT | Mod: S$GLB,,, | Performed by: SOCIAL WORKER

## 2024-02-05 PROCEDURE — 99999 PR PBB SHADOW E&M-EST. PATIENT-LVL I: CPT | Mod: PBBFAC,,, | Performed by: SOCIAL WORKER

## 2024-02-05 SDOH — SOCIAL DETERMINANTS OF HEALTH (SDOH): PROBLEM RELATED TO PRIMARY SUPPORT GROUP, UNSPECIFIED: Z63.9

## 2024-02-06 NOTE — PROGRESS NOTES
Individual Psychotherapy (PhD/LCSW)    2/5/2024    Site:  [facility] [address] [facility]         Therapeutic Intervention: Met with patient.  Outpatient - Insight oriented psychotherapy 60 min - CPT code 88758, Outpatient - Behavior modifying psychotherapy 60 min - CPT code 19857, Outpatient - Supportive psychotherapy 60 min - CPT Code 21879 and Outpatient - Interactive psychotherapy 60 min - CPT code 96921    Chief complaint/reason for encounter: depression, mood swings, anxiety, behavior and interpersonal.     Interval history and content of current session: Pt was last seen 2 weeks ago. Pt presents with her  for session and reports that she is starting to feel better with derepression & Symptoms.  Pt reports she is starting to feel better and it is visibly noticeable pt presents calm with relaxed affect- reports she is sleeping okay- less agitation- no pressured speech. Pt continues to work with her psychiatrist. Pt discussed getting through the holidays- challenges with her state and dealing with her grandchildren- pt reports everything went okay for her.  Pt reports she is feeling more optimistic and hopeful things will get better for her.  Pt reports she plans to return to [employer] or spend time with friends. Pt continues to see grandchildren.     Pt discussed recent visits with her grandchildren and has finally decided she would benefit from getting extra help with watching all the kids when they come over especially now that there are two additional cousins that are close in age who also come over.   Pt continues to discuss all of her commitments with her grandchildren & caring for her sister-in-law who is diagnosed with a stage IV cancer and is enrolled in a clinical trial.      Pt reports that continues to have her grandchildren over on Tues afternoons and Saturdays.  continues to express worry about the future and how she is going to manage everything-caring for her family.  Pt reports she has had less manic  carmens-continues to see psychiatrist- Dr. Mack. Pt reports- continues to be very involved with her family overall it is going better.  Pt stated she and her  and continue to stay connected to her close friends.  Insight/Judgement: adequate. Denies any SI/HI, NO A/V/H.   Focus: self care-managing her depression sanujs- exploring her anger about her daughter and fear of the future with her 's well being.        Treatment plan:  Target symptoms: distractibility, lack of focus, anxiety, recurrent depression, mood disorder, confusion, adjustment, family stress such as: caring for grandchildren,  is blind, sister-in-law dx'd with stage IV cancer-is her care taker and transports to all appts, legal issues with adoption and dealing with her daughter who is an addict.   Why chosen therapy is appropriate versus another modality: relevant to diagnosis, patient responds to this modality, evidence based practice  Outcome monitoring methods: self-report, observation, feedback from family  Therapeutic intervention type: insight oriented psychotherapy, behavior modifying psychotherapy, supportive psychotherapy, interactive psychotherapy    Risk parameters:  Patient reports no suicidal ideation  Patient reports no homicidal ideation  Patient reports no self-injurious behavior  Patient reports no violent behavior    Verbal deficits: None    Patient's response to intervention:  The patient's response to intervention is accepting.    Progress toward goals and other mental status changes:  The patient's progress toward goals is  progressing.  .    Diagnosis:     ICD-10-CM  PL            1. Bipolar disorder, current episode mixed, mild F31.61 296.61    2. HUMPHREY (generalized anxiety disorder) F41.1 300.02    3. Acute stress reaction F43.0 308.9    4. Death of family member Z63.4 V62.82    5. Sudden death R99 798.1           Plan:  individual psychotherapy and medication management by physician    Return to clinic: 2  weeks    Length of Service (minutes): 60

## 2024-02-19 ENCOUNTER — OFFICE VISIT (OUTPATIENT)
Dept: PSYCHIATRY | Facility: CLINIC | Age: 64
End: 2024-02-19
Payer: MEDICARE

## 2024-02-19 DIAGNOSIS — F31.0 BIPOLAR AFFECTIVE DISORDER, CURRENT EPISODE HYPOMANIC: Primary | ICD-10-CM

## 2024-02-19 DIAGNOSIS — Z63.9 FAMILY PROBLEMS: ICD-10-CM

## 2024-02-19 DIAGNOSIS — F32.A DEPRESSION, UNSPECIFIED DEPRESSION TYPE: ICD-10-CM

## 2024-02-19 DIAGNOSIS — F41.1 GAD (GENERALIZED ANXIETY DISORDER): ICD-10-CM

## 2024-02-19 PROCEDURE — 1159F MED LIST DOCD IN RCRD: CPT | Mod: CPTII,S$GLB,, | Performed by: SOCIAL WORKER

## 2024-02-19 PROCEDURE — 99999 PR PBB SHADOW E&M-EST. PATIENT-LVL I: CPT | Mod: PBBFAC,,, | Performed by: SOCIAL WORKER

## 2024-02-19 PROCEDURE — 90837 PSYTX W PT 60 MINUTES: CPT | Mod: S$GLB,,, | Performed by: SOCIAL WORKER

## 2024-02-19 SDOH — SOCIAL DETERMINANTS OF HEALTH (SDOH): PROBLEM RELATED TO PRIMARY SUPPORT GROUP, UNSPECIFIED: Z63.9

## 2024-03-07 NOTE — PROGRESS NOTES
Individual Psychotherapy (PhD/LCSW)    2/19/2024    Site:  [facility]         Therapeutic Intervention: Met with patient.  Outpatient - Insight oriented psychotherapy 60 min - CPT code 09034, Outpatient - Behavior modifying psychotherapy 60 min - CPT code 56456, Outpatient - Supportive psychotherapy 60 min - CPT Code 58004 and Outpatient - Interactive psychotherapy 60 min - CPT code 30585    Chief complaint/reason for encounter: depression, mood swings, anxiety, behavior and interpersonal.     Interval history and content of current session: Pt was last seen 2 weeks ago. Pt presents with her  for session and continues to report that she is starting to feel better with derepression & Symptoms.  Pt reports she is starting to feel better and it is visibly noticeable pt presents calm with relaxed affect- reports she is sleeping okay- less agitation- no pressured speech. Pt continues to work with her psychiatrist. Pt continues to discuss getting through the holidays- challenges with her state and dealing with her grandchildren- pt reports everything went okay for her.  Pt reports she is feeling more optimistic and hopeful things will get better for her.  Pt reports she plans to return to [employer] or spend time with friends. Pt continues to see grandchildren.     Pt discussed recent visits with her grandchildren and has finally decided she would benefit from getting extra help with watching all the kids when they come over especially now that there are two additional cousins that are close in age who also come over.   Pt continues to discuss all of her commitments with her grandchildren & caring for her sister-in-law who is diagnosed with a stage IV cancer and is enrolled in a clinical trial.      Pt reports that continues to have her grandchildren over on Tues afternoons and Saturdays.  continues to express worry about the future and how she is going to manage everything-caring for her family.  Pt reports  she has had less manic sx's-continues to see psychiatrist- Dr. Mack. Pt reports- continues to be very involved with her family overall it is going better.  Pt stated she and her  and continue to stay connected to her close friends.  Insight/Judgement: adequate. Denies any SI/HI, NO A/V/H.   Focus: self care-managing her depression sx's- exploring her anger about her daughter and fear of the future with her 's well being.        Treatment plan:  Target symptoms: distractibility, lack of focus, anxiety, recurrent depression, mood disorder, confusion, adjustment, family stress such as: caring for grandchildren,  is blind, sister-in-law dx'd with stage IV cancer-is her care taker and transports to all appts, legal issues with adoption and dealing with her daughter who is an addict.   Why chosen therapy is appropriate versus another modality: relevant to diagnosis, patient responds to this modality, evidence based practice  Outcome monitoring methods: self-report, observation, feedback from family  Therapeutic intervention type: insight oriented psychotherapy, behavior modifying psychotherapy, supportive psychotherapy, interactive psychotherapy    Risk parameters:  Patient reports no suicidal ideation  Patient reports no homicidal ideation  Patient reports no self-injurious behavior  Patient reports no violent behavior    Verbal deficits: None    Patient's response to intervention:  The patient's response to intervention is accepting.    Progress toward goals and other mental status changes:  The patient's progress toward goals is  progressing.  .    Diagnosis:     ICD-10-CM  PL            1. Bipolar disorder, current episode mixed, mild F31.61 296.61    2. HUMPHREY (generalized anxiety disorder) F41.1 300.02    3. Acute stress reaction F43.0 308.9    4. Death of family member Z63.4 V62.82    5. Sudden death R99 798.1           Plan:  individual psychotherapy and medication management by  physician    Return to clinic: 2 weeks    Length of Service (minutes): 60

## 2024-04-08 ENCOUNTER — PATIENT MESSAGE (OUTPATIENT)
Dept: PSYCHIATRY | Facility: CLINIC | Age: 64
End: 2024-04-08
Payer: MEDICARE

## 2024-04-15 ENCOUNTER — OFFICE VISIT (OUTPATIENT)
Dept: PSYCHIATRY | Facility: CLINIC | Age: 64
End: 2024-04-15
Payer: MEDICARE

## 2024-04-15 DIAGNOSIS — F41.1 GAD (GENERALIZED ANXIETY DISORDER): ICD-10-CM

## 2024-04-15 DIAGNOSIS — Z63.9 FAMILY PROBLEMS: ICD-10-CM

## 2024-04-15 DIAGNOSIS — F32.A DEPRESSION, UNSPECIFIED DEPRESSION TYPE: ICD-10-CM

## 2024-04-15 DIAGNOSIS — F31.32 BIPOLAR AFFECTIVE DISORDER, CURRENTLY DEPRESSED, MODERATE: Primary | ICD-10-CM

## 2024-04-15 PROCEDURE — 1159F MED LIST DOCD IN RCRD: CPT | Mod: CPTII,S$GLB,, | Performed by: SOCIAL WORKER

## 2024-04-15 PROCEDURE — 99999 PR PBB SHADOW E&M-EST. PATIENT-LVL I: CPT | Mod: PBBFAC,,, | Performed by: SOCIAL WORKER

## 2024-04-15 PROCEDURE — 90837 PSYTX W PT 60 MINUTES: CPT | Mod: S$GLB,,, | Performed by: SOCIAL WORKER

## 2024-04-15 SDOH — SOCIAL DETERMINANTS OF HEALTH (SDOH): PROBLEM RELATED TO PRIMARY SUPPORT GROUP, UNSPECIFIED: Z63.9

## 2024-04-30 ENCOUNTER — OFFICE VISIT (OUTPATIENT)
Dept: PSYCHIATRY | Facility: CLINIC | Age: 64
End: 2024-04-30
Payer: MEDICARE

## 2024-04-30 DIAGNOSIS — F32.A DEPRESSION, UNSPECIFIED DEPRESSION TYPE: ICD-10-CM

## 2024-04-30 DIAGNOSIS — F31.32 BIPOLAR AFFECTIVE DISORDER, CURRENTLY DEPRESSED, MODERATE: Primary | ICD-10-CM

## 2024-04-30 DIAGNOSIS — F41.1 GAD (GENERALIZED ANXIETY DISORDER): ICD-10-CM

## 2024-04-30 PROCEDURE — 1159F MED LIST DOCD IN RCRD: CPT | Mod: CPTII,S$GLB,, | Performed by: SOCIAL WORKER

## 2024-04-30 PROCEDURE — 90837 PSYTX W PT 60 MINUTES: CPT | Mod: S$GLB,,, | Performed by: SOCIAL WORKER

## 2024-04-30 PROCEDURE — 99999 PR PBB SHADOW E&M-EST. PATIENT-LVL I: CPT | Mod: PBBFAC,,, | Performed by: SOCIAL WORKER

## 2024-04-30 NOTE — PROGRESS NOTES
Individual Psychotherapy (PhD/LCSW)    4/30/2024    Site:  [facility]         Therapeutic Intervention: Met with patient.  Outpatient - Insight oriented psychotherapy 60 min - CPT code 80050, Outpatient - Behavior modifying psychotherapy 60 min - CPT code 19485, Outpatient - Supportive psychotherapy 60 min - CPT Code 49837 and Outpatient - Interactive psychotherapy 60 min - CPT code 39422    Chief complaint/reason for encounter: depression, mood swings, anxiety, behavior and interpersonal.     Interval history and content of current session: Pt was last seen 2 weeks ago. Pt presents with her  for session and continues to report that she is starting to feel better with derepression  however seemed to have a set back was ruminating more over the past- feeling that she has let other's down and presents with concerns of regret over the past. Pt is still sleeping better- has continued to feel calmer however notes at times will become very overwhelmed over situations with her grandchildren- family dynamics. Pt continues to focus on negative thoughts and is overly critical of self and overly focussed on what others (particularly the family thinks of her) Pt reports she is sleeping okay- less agitation- no pressured speech. Pt continues to work with her psychiatrist.  Pt reports she has still not been able to return to [employer]. Pt continues to see grandchildren. Pt will be following up with her treating psychiatrist in the community (Dr. Mack) to discuss how she has been feeling and has also developed a tremor.     Pt discussed recent visits with her grandchildren and has finally decided she would benefit from getting extra help with watching all the kids when they come over especially now that there are two additional cousins that are close in age who also come over.   Pt continues to discuss all of her commitments with her grandchildren & caring for her sister-in-law who is diagnosed with a stage IV cancer  and is enrolled in a clinical trial.      Pt reports that continues to have her grandchildren over on Tues afternoons and Saturdays.  continues to express worry about the future and how she is going to manage everything-caring for her family.  . Pt reports- continues to be very involved with her family overall it is going better.  Pt stated she and her  and continue to stay connected to her close friends.  Insight/Judgement: adequate. Denies any SI/HI, NO A/V/H.   Focus: self care-managing her depression sx's- exploring her anger about her daughter and fear of the future with her 's well being.        Treatment plan:  Target symptoms: distractibility, lack of focus, anxiety, recurrent depression, mood disorder, confusion, adjustment, family stress such as: caring for grandchildren,  is blind, sister-in-law dx'd with stage IV cancer-is her care taker and transports to all appts, legal issues with adoption and dealing with her daughter who is an addict.   Why chosen therapy is appropriate versus another modality: relevant to diagnosis, patient responds to this modality, evidence based practice  Outcome monitoring methods: self-report, observation, feedback from family  Therapeutic intervention type: insight oriented psychotherapy, behavior modifying psychotherapy, supportive psychotherapy, interactive psychotherapy    Risk parameters:  Patient reports no suicidal ideation  Patient reports no homicidal ideation  Patient reports no self-injurious behavior  Patient reports no violent behavior    Verbal deficits: None    Patient's response to intervention:  The patient's response to intervention is accepting.    Progress toward goals and other mental status changes:  The patient's progress toward goals is  progressing.  .    Diagnosis:     ICD-10-CM  PL            1. Bipolar disorder, current episode mixed, mild F31.61 296.61    2. HUMPHREY (generalized anxiety disorder) F41.1 300.02    3. Acute stress  reaction F43.0 308.9    4. Death of family member Z63.4 V62.82    5. Sudden death R99 798.1           Plan:  individual psychotherapy and medication management by physician    Return to clinic: 1- month    Length of Service (minutes): 60

## 2024-05-01 ENCOUNTER — PATIENT MESSAGE (OUTPATIENT)
Dept: PSYCHIATRY | Facility: CLINIC | Age: 64
End: 2024-05-01
Payer: MEDICARE

## 2024-05-04 NOTE — PROGRESS NOTES
"  Individual Psychotherapy (PhD/LCSW)    4/15/2024    Site:  [facility]         Therapeutic Intervention: Met with patient.  Outpatient - Insight oriented psychotherapy 60 min - CPT code 52563, Outpatient - Behavior modifying psychotherapy 60 min - CPT code 84382, Outpatient - Supportive psychotherapy 60 min - CPT Code 19694 and Outpatient - Interactive psychotherapy 60 min - CPT code 88568    Chief complaint/reason for encounter: depression, mood swings, anxiety, behavior and interpersonal.     Interval history and content of current session: Pt was last seen 2 weeks ago. Pt presents with her  for session.  Pt reports she thinks she has had a relapse with experiencing more depression sx's. Pt reports she did not see her granddaughters on Easter nor did she and her  get invited to a [place] boil on Good Friday with her eldest granddaughter's other grandparents. Pt stated her eldest granddaughter did come in for the holiday and stayed with her which was also hard for pt bc she was starting to feel "bad with her depression" when her granddaughter was there for her visit. Pt discussed how she does not want her family other than her  to see her that way.  Pt then started to ruminate on past hx and negative thoughts about herself -regrets on how she handled things with certain family members. Pt thinks that many people in her family still hold this against her. Pt's  disagrees and is not even sure on what she is talking about. Pt's  reports to be frustrated with his wife that she is starting to "rehash" all this old stuff that is over and not sure why she does this and notes it tends to get her even more depressed.  Pt continues to work with her psychiatrist. Pt continues to discuss getting through the easter holiday- challenges with her state and dealing with her grandchildren- pt reports everything went okay for her.  Pt reports she is feeling more optimistic and hopeful " things will get better for her.  Pt reports she plans to return to [employer] or spend time with friends. Pt continues to see grandchildren.     Pt discussed recent visits with her grandchildren and has finally decided she would benefit from getting extra help with watching all the kids when they come over especially now that there are two additional cousins that are close in age who also come over.   Pt continues to discuss all of her commitments with her grandchildren & caring for her sister-in-law who is diagnosed with a stage IV cancer and is enrolled in a clinical trial.      Pt reports that continues to have her grandchildren over on Tues afternoons and Saturdays.  continues to express worry about the future and how she is going to manage everything-caring for her family.  Pt reports she has had less manic sx's-continues to see psychiatrist- Dr. Mack. Pt reports- continues to be very involved with her family overall it is going better.  Pt stated she and her  and continue to stay connected to her close friends.  Insight/Judgement: adequate. Denies any SI/HI, NO A/V/H.   Focus: self care-managing her depression sx's- exploring her anger about her daughter and fear of the future with her 's well being.        Treatment plan:  Target symptoms: distractibility, lack of focus, anxiety, recurrent depression, mood disorder, confusion, adjustment, family stress such as: caring for grandchildren,  is blind, sister-in-law dx'd with stage IV cancer-is her care taker and transports to all appts, legal issues with adoption and dealing with her daughter who is an addict.   Why chosen therapy is appropriate versus another modality: relevant to diagnosis, patient responds to this modality, evidence based practice  Outcome monitoring methods: self-report, observation, feedback from family  Therapeutic intervention type: insight oriented psychotherapy, behavior modifying psychotherapy, supportive  psychotherapy, interactive psychotherapy    Risk parameters:  Patient reports no suicidal ideation  Patient reports no homicidal ideation  Patient reports no self-injurious behavior  Patient reports no violent behavior    Verbal deficits: None    Patient's response to intervention:  The patient's response to intervention is accepting.    Progress toward goals and other mental status changes:  The patient's progress toward goals is  progressing.  .    Diagnosis:     ICD-10-CM  PL            1. Bipolar disorder, current episode mixed, mild F31.61 296.61    2. HUMPHREY (generalized anxiety disorder) F41.1 300.02    3. Acute stress reaction F43.0 308.9    4. Death of family member Z63.4 V62.82    5. Sudden death R99 798.1           Plan:  individual psychotherapy and medication management by physician    Return to clinic: 2 weeks    Length of Service (minutes): 60

## 2024-05-07 ENCOUNTER — OFFICE VISIT (OUTPATIENT)
Dept: PSYCHIATRY | Facility: CLINIC | Age: 64
End: 2024-05-07
Payer: MEDICARE

## 2024-05-07 DIAGNOSIS — F31.32 BIPOLAR AFFECTIVE DISORDER, CURRENTLY DEPRESSED, MODERATE: Primary | ICD-10-CM

## 2024-05-07 DIAGNOSIS — Z63.9 FAMILY PROBLEMS: ICD-10-CM

## 2024-05-07 DIAGNOSIS — F32.A DEPRESSION, UNSPECIFIED DEPRESSION TYPE: ICD-10-CM

## 2024-05-07 DIAGNOSIS — F41.1 GAD (GENERALIZED ANXIETY DISORDER): ICD-10-CM

## 2024-05-07 PROCEDURE — 99999 PR PBB SHADOW E&M-EST. PATIENT-LVL I: CPT | Mod: PBBFAC,,, | Performed by: SOCIAL WORKER

## 2024-05-07 PROCEDURE — 90837 PSYTX W PT 60 MINUTES: CPT | Mod: S$GLB,,, | Performed by: SOCIAL WORKER

## 2024-05-07 PROCEDURE — 1159F MED LIST DOCD IN RCRD: CPT | Mod: CPTII,S$GLB,, | Performed by: SOCIAL WORKER

## 2024-05-07 SDOH — SOCIAL DETERMINANTS OF HEALTH (SDOH): PROBLEM RELATED TO PRIMARY SUPPORT GROUP, UNSPECIFIED: Z63.9

## 2024-05-10 NOTE — PROGRESS NOTES
Individual Psychotherapy (PhD/LCSW)    5/7/2024    Site:  [facility]         Therapeutic Intervention: Met with patient.  Outpatient - Insight oriented psychotherapy 60 min - CPT code 39115, Outpatient - Behavior modifying psychotherapy 60 min - CPT code 03079, Outpatient - Supportive psychotherapy 60 min - CPT Code 92691 and Outpatient - Interactive psychotherapy 60 min - CPT code 33751    Chief complaint/reason for encounter: depression, mood swings, anxiety, behavior and interpersonal.     Interval history and content of current session: Pt was last seen 2 weeks ago. Pt presents with her  for session and continues to report that she is starting to feel better with derepression  however seemed to have a set back was ruminating more over the past- feeling that she has let other's down and presents with concerns of regret over the past. Pt is still sleeping better- has continued to feel calmer however notes at times will become very overwhelmed over situations with her grandchildren- family dynamics. Pt continues to focus on negative thoughts and is overly critical of self and overly focussed on what others (particularly the family thinks of her) Pt reports she is sleeping okay- less agitation- no pressured speech. Pt continues to work with her psychiatrist.  Pt reports she has still not been able to return to [employer]. Pt continues to see grandchildren. Pt will be following up with her treating psychiatrist in the community (Dr. Mack) to discuss how she has been feeling and has also developed a tremor.     Pt discussed recent visits with her grandchildren and has finally decided she would benefit from getting extra help with watching all the kids when they come over especially now that there are two additional cousins that are close in age who also come over.   Pt continues to discuss all of her commitments with her grandchildren & caring for her sister-in-law who is diagnosed with a stage IV cancer  and is enrolled in a clinical trial.      Pt reports that continues to have her grandchildren over on Tues afternoons and Saturdays.  continues to express worry about the future and how she is going to manage everything-caring for her family.  . Pt reports- continues to be very involved with her family overall it is going better.  Pt stated she and her  and continue to stay connected to her close friends.  Insight/Judgement: adequate. Denies any SI/HI, NO A/V/H.   Focus: self care-managing her depression sx's- exploring her anger about her daughter and fear of the future with her 's well being.        Treatment plan:  Target symptoms: distractibility, lack of focus, anxiety, recurrent depression, mood disorder, confusion, adjustment, family stress such as: caring for grandchildren,  is blind, sister-in-law dx'd with stage IV cancer-is her care taker and transports to all appts, legal issues with adoption and dealing with her daughter who is an addict.   Why chosen therapy is appropriate versus another modality: relevant to diagnosis, patient responds to this modality, evidence based practice  Outcome monitoring methods: self-report, observation, feedback from family  Therapeutic intervention type: insight oriented psychotherapy, behavior modifying psychotherapy, supportive psychotherapy, interactive psychotherapy    Risk parameters:  Patient reports no suicidal ideation  Patient reports no homicidal ideation  Patient reports no self-injurious behavior  Patient reports no violent behavior    Verbal deficits: None    Patient's response to intervention:  The patient's response to intervention is accepting.    Progress toward goals and other mental status changes:  The patient's progress toward goals is  progressing.  .    Diagnosis:     ICD-10-CM  PL            1. Bipolar disorder, current episode mixed, mild F31.61 296.61    2. HUMPHREY (generalized anxiety disorder) F41.1 300.02    3. Acute stress  reaction F43.0 308.9    4. Death of family member Z63.4 V62.82    5. Sudden death R99 798.1           Plan:  individual psychotherapy and medication management by physician    Return to clinic: 1- month    Length of Service (minutes): 60

## 2024-06-05 ENCOUNTER — TELEPHONE (OUTPATIENT)
Dept: PRIMARY CARE CLINIC | Facility: CLINIC | Age: 64
End: 2024-06-05
Payer: MEDICARE

## 2024-06-05 DIAGNOSIS — E78.2 MIXED HYPERLIPIDEMIA: ICD-10-CM

## 2024-06-05 DIAGNOSIS — Z00.00 ROUTINE GENERAL MEDICAL EXAMINATION AT A HEALTH CARE FACILITY: Primary | ICD-10-CM

## 2024-06-19 NOTE — TELEPHONE ENCOUNTER
Infusion Nursing Note:  Angel Yanez presents today for 2 units of plateltets.    Patient seen by provider today: Yes: Dr. Lucio   present during visit today: Not Applicable.    Note: Patient here for 2 units of platelets for plt count of 3. No pre-meds needed. Patient met parameters for rbcs as well with hgb of 7.9 but per patient request and okay by Dr. Lucio, no transfusion of blood today and will return on 2/29. Patient agreed with plan and verbalized understanding.    Intravenous Access:  Peripheral IV placed.    Treatment Conditions:  Lab Results   Component Value Date    HGB 7.9 02/27/2020     Lab Results   Component Value Date    WBC 5.1 02/27/2020      Lab Results   Component Value Date    ANEU 3.7 02/27/2020     Lab Results   Component Value Date    PLT 3 02/27/2020      Results reviewed, labs MET treatment parameters, ok to proceed with treatment.      Post Infusion Assessment:  Patient tolerated infusion without incident.  Blood return noted pre and post infusion.  Site patent and intact, free from redness, edema or discomfort.  No evidence of extravasations.  Access discontinued per protocol.       Discharge Plan:   Patient discharged in stable condition accompanied by: self.  Departure Mode: Ambulatory.    ANKITA SILVERMAN RN                         Refill Routing Note   Medication(s) are not appropriate for processing by [facility] for the following reason(s):        Required labs outdated    [facility] action(s):  Defer               Appointments  past 12m or future 3m with PCP    Date Provider   Last Visit   3/29/2023 Ronna Dalal MD   Next Visit   7/18/2024 Ronna Dalal MD   ED visits in past 90 days: 0        Note composed:10:11 AM 06/19/2024

## 2024-06-25 ENCOUNTER — OFFICE VISIT (OUTPATIENT)
Dept: PSYCHIATRY | Facility: CLINIC | Age: 64
End: 2024-06-25
Payer: MEDICARE

## 2024-06-25 DIAGNOSIS — F42.8 OBSESSIVE THINKING: ICD-10-CM

## 2024-06-25 DIAGNOSIS — F32.A DEPRESSION, UNSPECIFIED DEPRESSION TYPE: ICD-10-CM

## 2024-06-25 DIAGNOSIS — F31.32 BIPOLAR AFFECTIVE DISORDER, CURRENTLY DEPRESSED, MODERATE: Primary | ICD-10-CM

## 2024-06-25 PROCEDURE — 1159F MED LIST DOCD IN RCRD: CPT | Mod: CPTII,S$GLB,, | Performed by: SOCIAL WORKER

## 2024-06-25 PROCEDURE — 99999 PR PBB SHADOW E&M-EST. PATIENT-LVL I: CPT | Mod: PBBFAC,,, | Performed by: SOCIAL WORKER

## 2024-06-25 PROCEDURE — 90837 PSYTX W PT 60 MINUTES: CPT | Mod: S$GLB,,, | Performed by: SOCIAL WORKER

## 2024-06-25 RX ORDER — ROSUVASTATIN CALCIUM 10 MG/1
TABLET, COATED ORAL
Qty: 90 TABLET | Refills: 0 | Status: SHIPPED | OUTPATIENT
Start: 2024-06-25

## 2024-06-25 NOTE — PROGRESS NOTES
Individual Psychotherapy (PhD/LCSW)    6/25/2024    Site:  [facility]         Therapeutic Intervention: Met with patient.  Outpatient - Insight oriented psychotherapy 60 min - CPT code 65459, Outpatient - Behavior modifying psychotherapy 60 min - CPT code 31859, Outpatient - Supportive psychotherapy 60 min - CPT Code 16351 and Outpatient - Interactive psychotherapy 60 min - CPT code 46044    Chief complaint/reason for encounter: depression, mood swings, anxiety, behavior and interpersonal.     Interval history and content of current session: Pt was last seen 1-month ago. Pt presents with her  for session and reports she is still doing better no darin however pt is tearful in session- and notes she has had more episodes of having a sense of dread and can't attribute this feeling to any event or situation. Pt reports she has been able to have her grandchildren over on Sat to have them over at her home for swimming.  Pt also notes while in the pool with the kids she began to experience this sense of dread and could not figure out why she is feeling this way. Pt also describes having difficult mornings more days than Not.  Pt reports she will wake up in the morning and think about things from the past that she regrets pt will replay past situations with her family- continues to ruminating more over the past- feeling that she has let other's down and presents with concerns of regret over the past. Pt is still sleeping better- has continued to feel calmer however notes at times will become very overwhelmed over situations with her grandchildren- family dynamics. Pt continues to focus on negative thoughts and is overly critical of self and overly focussed on what others (particularly the family thinks of her) Pt reports she is sleeping okay- less agitation- no pressured speech. Pt continues to work with her psychiatrist.  Pt reports she has still not been able to return to [employer]. Pt continues to see  grandchildren. Pt will be following up with her treating psychiatrist in the community (Dr. Mack) to discuss how she has been feeling and has also developed a tremor.     Pt discussed recent visits with her grandchildren and has finally decided she would benefit from getting extra help with watching all the kids when they come over especially now that there are two additional cousins that are close in age who also come over.   Pt continues to discuss all of her commitments with her grandchildren & caring for her sister-in-law who is diagnosed with a stage IV cancer and is enrolled in a clinical trial.      Pt reports that continues to have her grandchildren over on Tues afternoons and Saturdays.  continues to express worry about the future and how she is going to manage everything-caring for her family.  . Pt reports- continues to be very involved with her family overall it is going better.  Pt stated she and her  and continue to stay connected to her close friends.  Insight/Judgement: adequate. Denies any SI/HI, NO A/V/H.   Focus: self care-managing her depression sx's- exploring her anger about her daughter and fear of the future with her 's well being.        Treatment plan:  Target symptoms: distractibility, lack of focus, anxiety, recurrent depression, mood disorder, confusion, adjustment, family stress such as: caring for grandchildren,  is blind, sister-in-law dx'd with stage IV cancer-is her care taker and transports to all appts, legal issues with adoption and dealing with her daughter who is an addict.   Why chosen therapy is appropriate versus another modality: relevant to diagnosis, patient responds to this modality, evidence based practice  Outcome monitoring methods: self-report, observation, feedback from family  Therapeutic intervention type: insight oriented psychotherapy, behavior modifying psychotherapy, supportive psychotherapy, interactive psychotherapy    Risk  parameters:  Patient reports no suicidal ideation  Patient reports no homicidal ideation  Patient reports no self-injurious behavior  Patient reports no violent behavior    Verbal deficits: None    Patient's response to intervention:  The patient's response to intervention is accepting.    Progress toward goals and other mental status changes:  The patient's progress toward goals is  progressing.  .    Diagnosis:     ICD-10-CM  PL            1. Bipolar disorder, current episode mixed, mild F31.61 296.61    2. HUMPHREY (generalized anxiety disorder) F41.1 300.02    3. Acute stress reaction F43.0 308.9    4. Death of family member Z63.4 V62.82    5. Sudden death R99 798.1           Plan:  individual psychotherapy and medication management by physician    Return to clinic: 1- month    Length of Service (minutes): 60

## 2024-07-10 ENCOUNTER — OFFICE VISIT (OUTPATIENT)
Dept: PSYCHIATRY | Facility: CLINIC | Age: 64
End: 2024-07-10
Payer: MEDICARE

## 2024-07-10 DIAGNOSIS — Z63.9 FAMILY PROBLEMS: ICD-10-CM

## 2024-07-10 DIAGNOSIS — F41.1 GAD (GENERALIZED ANXIETY DISORDER): ICD-10-CM

## 2024-07-10 DIAGNOSIS — F31.32 BIPOLAR AFFECTIVE DISORDER, CURRENTLY DEPRESSED, MODERATE: Primary | ICD-10-CM

## 2024-07-10 DIAGNOSIS — F32.A DEPRESSION, UNSPECIFIED DEPRESSION TYPE: ICD-10-CM

## 2024-07-10 PROCEDURE — 90837 PSYTX W PT 60 MINUTES: CPT | Mod: S$GLB,,, | Performed by: SOCIAL WORKER

## 2024-07-10 SDOH — SOCIAL DETERMINANTS OF HEALTH (SDOH): PROBLEM RELATED TO PRIMARY SUPPORT GROUP, UNSPECIFIED: Z63.9

## 2024-07-18 ENCOUNTER — OFFICE VISIT (OUTPATIENT)
Dept: PRIMARY CARE CLINIC | Facility: CLINIC | Age: 64
End: 2024-07-18
Payer: MEDICARE

## 2024-07-18 VITALS
BODY MASS INDEX: 21.83 KG/M2 | HEART RATE: 97 BPM | HEIGHT: 64 IN | TEMPERATURE: 99 F | DIASTOLIC BLOOD PRESSURE: 82 MMHG | WEIGHT: 127.88 LBS | SYSTOLIC BLOOD PRESSURE: 106 MMHG | OXYGEN SATURATION: 100 %

## 2024-07-18 DIAGNOSIS — E78.2 MIXED HYPERLIPIDEMIA: ICD-10-CM

## 2024-07-18 DIAGNOSIS — I47.10 SVT (SUPRAVENTRICULAR TACHYCARDIA): ICD-10-CM

## 2024-07-18 DIAGNOSIS — Z00.00 ROUTINE GENERAL MEDICAL EXAMINATION AT A HEALTH CARE FACILITY: Primary | ICD-10-CM

## 2024-07-18 DIAGNOSIS — F31.32 BIPOLAR AFFECTIVE DISORDER, CURRENTLY DEPRESSED, MODERATE: ICD-10-CM

## 2024-07-18 PROBLEM — F42.8 OBSESSIVE THINKING: Status: RESOLVED | Noted: 2024-06-25 | Resolved: 2024-07-18

## 2024-07-18 PROCEDURE — 1101F PT FALLS ASSESS-DOCD LE1/YR: CPT | Mod: CPTII,S$GLB,, | Performed by: FAMILY MEDICINE

## 2024-07-18 PROCEDURE — 3288F FALL RISK ASSESSMENT DOCD: CPT | Mod: CPTII,S$GLB,, | Performed by: FAMILY MEDICINE

## 2024-07-18 PROCEDURE — 1159F MED LIST DOCD IN RCRD: CPT | Mod: CPTII,S$GLB,, | Performed by: FAMILY MEDICINE

## 2024-07-18 PROCEDURE — 99999 PR PBB SHADOW E&M-EST. PATIENT-LVL IV: CPT | Mod: PBBFAC,,, | Performed by: FAMILY MEDICINE

## 2024-07-18 PROCEDURE — 99397 PER PM REEVAL EST PAT 65+ YR: CPT | Mod: S$GLB,,, | Performed by: FAMILY MEDICINE

## 2024-07-18 PROCEDURE — 1160F RVW MEDS BY RX/DR IN RCRD: CPT | Mod: CPTII,S$GLB,, | Performed by: FAMILY MEDICINE

## 2024-07-18 PROCEDURE — 3074F SYST BP LT 130 MM HG: CPT | Mod: CPTII,S$GLB,, | Performed by: FAMILY MEDICINE

## 2024-07-18 PROCEDURE — 3079F DIAST BP 80-89 MM HG: CPT | Mod: CPTII,S$GLB,, | Performed by: FAMILY MEDICINE

## 2024-07-18 PROCEDURE — 3008F BODY MASS INDEX DOCD: CPT | Mod: CPTII,S$GLB,, | Performed by: FAMILY MEDICINE

## 2024-07-18 PROCEDURE — 1126F AMNT PAIN NOTED NONE PRSNT: CPT | Mod: CPTII,S$GLB,, | Performed by: FAMILY MEDICINE

## 2024-07-18 RX ORDER — LAMOTRIGINE 150 MG/1
150 TABLET ORAL 2 TIMES DAILY
COMMUNITY
Start: 2024-06-09

## 2024-07-18 RX ORDER — DIAZEPAM 5 MG/1
5 TABLET ORAL 3 TIMES DAILY
COMMUNITY
Start: 2024-07-06

## 2024-07-18 RX ORDER — ROSUVASTATIN CALCIUM 10 MG/1
10 TABLET, COATED ORAL DAILY
Qty: 90 TABLET | Refills: 2 | Status: SHIPPED | OUTPATIENT
Start: 2024-07-18

## 2024-07-18 RX ORDER — LITHIUM CARBONATE 300 MG/1
600 TABLET, FILM COATED, EXTENDED RELEASE ORAL NIGHTLY
COMMUNITY
Start: 2024-06-17
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
## 2024-07-24 ENCOUNTER — OFFICE VISIT (OUTPATIENT)
Dept: PSYCHIATRY | Facility: CLINIC | Age: 64
End: 2024-07-24
Payer: MEDICARE

## 2024-07-24 DIAGNOSIS — F32.A DEPRESSION, UNSPECIFIED DEPRESSION TYPE: ICD-10-CM

## 2024-07-24 DIAGNOSIS — Z63.9 FAMILY PROBLEMS: ICD-10-CM

## 2024-07-24 DIAGNOSIS — F41.1 GAD (GENERALIZED ANXIETY DISORDER): ICD-10-CM

## 2024-07-24 DIAGNOSIS — F31.32 BIPOLAR AFFECTIVE DISORDER, CURRENTLY DEPRESSED, MODERATE: Primary | ICD-10-CM

## 2024-07-24 PROCEDURE — 99999 PR PBB SHADOW E&M-EST. PATIENT-LVL I: CPT | Mod: PBBFAC,,, | Performed by: SOCIAL WORKER

## 2024-07-24 SDOH — SOCIAL DETERMINANTS OF HEALTH (SDOH): PROBLEM RELATED TO PRIMARY SUPPORT GROUP, UNSPECIFIED: Z63.9

## 2024-08-07 ENCOUNTER — OFFICE VISIT (OUTPATIENT)
Dept: PSYCHIATRY | Facility: CLINIC | Age: 64
End: 2024-08-07
Payer: MEDICARE

## 2024-08-07 DIAGNOSIS — F31.32 BIPOLAR AFFECTIVE DISORDER, CURRENTLY DEPRESSED, MODERATE: Primary | ICD-10-CM

## 2024-08-07 DIAGNOSIS — F41.1 GAD (GENERALIZED ANXIETY DISORDER): ICD-10-CM

## 2024-08-07 DIAGNOSIS — Z63.9 FAMILY PROBLEMS: ICD-10-CM

## 2024-08-07 PROCEDURE — 1159F MED LIST DOCD IN RCRD: CPT | Mod: CPTII,S$GLB,, | Performed by: SOCIAL WORKER

## 2024-08-07 PROCEDURE — 99999 PR PBB SHADOW E&M-EST. PATIENT-LVL I: CPT | Mod: PBBFAC,,, | Performed by: SOCIAL WORKER

## 2024-08-07 PROCEDURE — 90837 PSYTX W PT 60 MINUTES: CPT | Mod: S$GLB,,, | Performed by: SOCIAL WORKER

## 2024-08-07 SDOH — SOCIAL DETERMINANTS OF HEALTH (SDOH): PROBLEM RELATED TO PRIMARY SUPPORT GROUP, UNSPECIFIED: Z63.9

## 2024-08-21 ENCOUNTER — OFFICE VISIT (OUTPATIENT)
Dept: PSYCHIATRY | Facility: CLINIC | Age: 64
End: 2024-08-21
Payer: MEDICARE

## 2024-08-21 DIAGNOSIS — F31.32 BIPOLAR AFFECTIVE DISORDER, CURRENTLY DEPRESSED, MODERATE: Primary | ICD-10-CM

## 2024-08-21 DIAGNOSIS — Z63.9 FAMILY PROBLEMS: ICD-10-CM

## 2024-08-21 DIAGNOSIS — F41.1 GAD (GENERALIZED ANXIETY DISORDER): ICD-10-CM

## 2024-08-21 PROCEDURE — 1159F MED LIST DOCD IN RCRD: CPT | Mod: CPTII,S$GLB,, | Performed by: SOCIAL WORKER

## 2024-08-21 PROCEDURE — 90837 PSYTX W PT 60 MINUTES: CPT | Mod: S$GLB,,, | Performed by: SOCIAL WORKER

## 2024-08-21 PROCEDURE — 99999 PR PBB SHADOW E&M-EST. PATIENT-LVL I: CPT | Mod: PBBFAC,,, | Performed by: SOCIAL WORKER

## 2024-08-21 SDOH — SOCIAL DETERMINANTS OF HEALTH (SDOH): PROBLEM RELATED TO PRIMARY SUPPORT GROUP, UNSPECIFIED: Z63.9

## 2024-09-04 NOTE — PROGRESS NOTES
[facility]    Chief Complaint      Chief Complaint   Patient presents with    right sided discomfort     History of Present Illness      Kimberly Jo is a 64 y.o. female patient of Dr. Sutton who presents today for right sided discomfort for about 1-1.5 months.  This pt is new to me.  PMH:  mixed HLD, SVT, chronic constipation, GERD, HUMPHREY, depression, bipolar disorder.    When pt first noticed the gradual discomfort on her right side/ right lower back, she thought she had a bruise.  The discomfort seemed to occur when she would lie on her side, or when she would carry her grand kids. Pt often will carry her 2 year old grandchild who is about 40 lbs.  Pt described the discomfort as a bad bruise, dull ache.  Denies sharp, shooting pain, or pain radiating down her lower extremity.  Pt also denies saddle anesthesia, buckling knees, CP, SOB, palpitations, HA, dizziness, weakness, n/v/d/c, abdominal pain.  Holding her grand kids or any heavy object made the discomfort worse.  Pt is unsure if anything made it better.  Pain 0/10 during today's clinic visit.  Associated factors:  initially, pt thought it maybe a kidney stone, but her previous kidney stones were associated with significantly more pain.      Health Maintenance   Topic Date Due    Mammogram  07/12/2024    TETANUS VACCINE  06/02/2025    DEXA Scan  07/12/2026    Colorectal Cancer Screening  10/22/2028    Lipid Panel  07/11/2029    Hepatitis C Screening  Completed    Shingles Vaccine  Completed       Past Medical History:   Diagnosis Date    Bipolar affective disorder, currently depressed, moderate     Dr Lourdes Mack    Routine general medical examination at a health care facility 07/18/2024    SVT (supraventricular tachycardia)     2015 ablated with Adenosine EMT (too much caffeine), cardiologist rec bblocker if it recurrs    Vitamin D deficiency     2015 OTC suppl    Word finding difficulty 07/10/2019    MRI nml 2019, refer to Neurol  7/2019       Past Surgical History:   Procedure Laterality Date    APPENDECTOMY  8/28/2020    Procedure: APPENDECTOMY;  Surgeon: David Eldridge MD;  Location: [facility] OR [address] [place] [address];  Service: General;;    LAPAROSCOPIC APPENDECTOMY N/A 8/28/2020    Procedure: APPENDECTOMY, LAPAROSCOPIC CONVERTED TO OPEN;  Surgeon: David Eldridge MD;  Location: [facility] OR [address] [place] [address];  Service: General;  Laterality: N/A;       family history includes Alzheimer's disease in her mother; Cancer in her father; Heart disease (age of onset: 28) in her brother; Hypertension in her mother; Melanoma in her father.    Social History     Tobacco Use    Smoking status: Never    Smokeless tobacco: Never   Substance Use Topics    Alcohol use: No     Alcohol/week: 2.5 standard drinks of alcohol     Types: 3 Standard drinks or equivalent per week    Drug use: No       Review of Systems   Constitutional: Negative.    HENT: Negative.     Eyes: Negative.    Respiratory: Negative.     Cardiovascular: Negative.    Gastrointestinal: Negative.    Genitourinary: Negative.    Musculoskeletal:  Positive for back pain. Negative for falls.   Skin: Negative.    Neurological: Negative.         Outpatient Encounter Medications as of 9/5/2024   Medication Sig Dispense Refill    diazePAM (VALIUM) 5 MG tablet Take 5 mg by mouth 3 (three) times daily.      latanoprost 0.005 % ophthalmic solution INSTILL 1 DROP INTO EACH EYE AT BEDTIME      lithium (LITHOBID) 300 MG CR tablet Take 600 mg by mouth every evening.      LYBALVI 5-10 mg Tab Take 1 tablet by mouth every evening.      multivit-min/ferrous fumarate (MULTI VITAMIN ORAL) Take by mouth.      rosuvastatin (CRESTOR) 10 MG tablet Take 1 tablet (10 mg total) by mouth once daily. 90 tablet 2    biotin 2,500 mcg Tab Take 50,000 mcg by mouth once daily. (Patient not taking: Reported on 9/5/2024)      CELEXA 10 mg tablet Take 10 mg by mouth every evening. (Patient not taking: Reported on 9/5/2024)      cholecalciferol,  "vitamin D3, (VITAMIN D3) 50 mcg (2,000 unit) Tab Take by mouth once daily. (Patient not taking: Reported on 9/5/2024)      cyanocobalamin 500 MCG tablet Take 5,000 mcg by mouth once daily. (Patient not taking: Reported on 9/5/2024)      FLAREX 0.1 % DrpS Place 1 drop into both eyes 3 (three) times daily. (Patient not taking: Reported on 9/5/2024)      lamoTRIgine (LAMICTAL) 150 MG Tab Take 150 mg by mouth 2 (two) times daily. (Patient not taking: Reported on 9/5/2024)      methocarbamoL (ROBAXIN) 500 MG Tab Take 1 tablet (500 mg total) by mouth 3 (three) times daily as needed (right side lower back pain). 20 tablet 0    pneumoc 20-nitesh conj-dip cr,PF, (PREVNAR 20, PF,) 0.5 mL Syrg injection Inject into the muscle. (Patient not taking: Reported on 9/5/2024) 0.5 mL 0    QUEtiapine (SEROQUEL XR) 300 MG Tb24 Take 300 mg by mouth every evening. (Patient not taking: Reported on 9/5/2024)      QUEtiapine (SEROQUEL XR) 400 MG Tb24 Take 400 mg by mouth nightly. (Patient not taking: Reported on 9/5/2024)      vit A/vit C/vit E/zinc/copper (PRESERVISION AREDS ORAL) Take by mouth. (Patient not taking: Reported on 9/5/2024)      zinc gluconate 50 mg tablet Take 50 mg by mouth once daily. (Patient not taking: Reported on 9/5/2024)       No facility-administered encounter medications on file as of 9/5/2024.        Review of patient's allergies indicates:   Allergen Reactions    Iodine      Other reaction(s): Vomiting    Sulfa (sulfonamide antibiotics)      Other reaction(s): Swelling       Physical Exam      Vital Signs  Pulse: 81  Resp: 10  SpO2: 98 %  BP: 116/72  BP Location: Left arm  Patient Position: Sitting  Pain Score: 0-No pain  Height and Weight  Height: 5' 3" (160 cm)  Weight: 59.3 kg (130 lb 11.7 oz)  BSA (Calculated - sq m): 1.62 sq meters  BMI (Calculated): 23.2  Weight in (lb) to have BMI = 25: 140.8    Physical Exam  Vitals reviewed.   Constitutional:       Appearance: Normal appearance.   HENT:      Head: " Normocephalic and atraumatic.   Cardiovascular:      Rate and Rhythm: Normal rate and regular rhythm.      Pulses: Normal pulses.      Heart sounds: Normal heart sounds.   Pulmonary:      Effort: Pulmonary effort is normal.      Breath sounds: Normal breath sounds.   Musculoskeletal:      Lumbar back: No bony tenderness. Negative right straight leg raise test and negative left straight leg raise test.        Back:       Comments: Slight discomfort to right lower back/right side.   Skin:     General: Skin is warm and dry.      Capillary Refill: Capillary refill takes less than 2 seconds.   Neurological:      General: No focal deficit present.      Mental Status: She is alert and oriented to person, place, and time.      Sensory: Sensation is intact.      Motor: Motor function is intact.      Coordination: Coordination is intact.      Gait: Gait is intact.      Deep Tendon Reflexes:      Reflex Scores:       Patellar reflexes are 2+ on the right side and 2+ on the left side.       Laboratory:  CBC:  Lab Results   Component Value Date    WBC 8.61 07/11/2024    RBC 4.18 07/11/2024    HGB 13.3 07/11/2024    HCT 41.8 07/11/2024     07/11/2024     (H) 07/11/2024    MCH 31.8 (H) 07/11/2024    MCHC 31.8 (L) 07/11/2024    MCHC 33.3 06/20/2023    MCHC 33.1 03/22/2023     CMP:  Lab Results   Component Value Date    GLU 90 01/08/2024    CALCIUM 10.1 01/08/2024    ALBUMIN 4.1 01/08/2024    PROT 7.7 01/08/2024     01/08/2024    K 4.8 01/08/2024    CO2 25 01/08/2024     01/08/2024    BUN 11 01/08/2024    ALKPHOS 102 01/08/2024    ALT 37 01/08/2024    AST 25 01/08/2024    BILITOT 0.5 01/08/2024    BILITOT 0.6 06/20/2023    BILITOT 0.5 03/22/2023     URINALYSIS:  Lab Results   Component Value Date    COLORU Yellow 08/28/2020    SPECGRAV 1.015 08/28/2020    PHUR 6.0 08/28/2020    PROTEINUA Negative 08/28/2020    BACTERIA Occasional 08/28/2020    NITRITE Negative 08/28/2020    LEUKOCYTESUR Negative 08/28/2020     UROBILINOGEN NORMAL 12/05/2018      LIPIDS:  Lab Results   Component Value Date    TSH 1.288 02/09/2024    TSH 0.723 01/08/2024    TSH 0.88 06/20/2023    HDL 67 07/11/2024    HDL 72 06/20/2023    HDL 55 03/22/2023    CHOL 173 07/11/2024    CHOL 184 06/20/2023    CHOL 144 03/22/2023    TRIG 108 07/11/2024    TRIG 141 06/20/2023    TRIG 107 03/22/2023    LDLCALC 84.4 07/11/2024    LDLCALC 88 06/20/2023    LDLCALC 67.6 03/22/2023    CHOLHDL 38.7 07/11/2024    CHOLHDL 2.6 06/20/2023    CHOLHDL 38.2 03/22/2023    NONHDLCHOL 106 07/11/2024    NONHDLCHOL 112 06/20/2023    NONHDLCHOL 89 03/22/2023    TOTALCHOLEST 2.6 07/11/2024    TOTALCHOLEST 2.6 03/22/2023    TOTALCHOLEST 3.5 02/21/2022     TSH:  Lab Results   Component Value Date    TSH 1.288 02/09/2024    TSH 0.723 01/08/2024    TSH 0.88 06/20/2023     A1C:  Lab Results   Component Value Date    HGBA1C 5.4 06/20/2023    HGBA1C 5.5 02/21/2022    HGBA1C 5.4 06/15/2017         Assessment/Plan     Kimberly Jo is a 64 y.o.female with: right sided discomfort x 1-1.5 months.  The discomfort seemed to occur when she would lie on her side, or when she would carry her grand kids.  Pt described the discomfort as a bad bruise, dull ache.  Denies sharp, shooting pain, or pain radiating down her lower extremity. Holding her grand kids or any heavy object made the discomfort worse.      Neuro assessment was benign and pt had full ROM during PE.  Slight discomfort was noted when palpating the right lower back/right side.  High suspicion for musculoskeletal etiology, possibly overstretching muscles from carrying her grand child.  Will give pt a short course of robaxin and see if she has any relief.  Will also encourage pt perform lower back stretches and to rotate ice and heat to area.  If no improvement in a few weeks, consider imaging, PT, possible ortho or sports medicine referral    1. Right-sided low back pain without sciatica, unspecified chronicity  -     methocarbamoL  (ROBAXIN) 500 MG Tab; Take 1 tablet (500 mg total) by mouth 3 (three) times daily as needed (right side lower back pain).  Dispense: 20 tablet; Refill: 0    Back stretches, rotate ice and heat to area, and use robaxin as needed for relief.      I spent 44 minutes on the day of this encounter for preparing for, evaluating, treating, and managing this patient.      -Continue current medications and maintain follow up with specialists.  Return to clinic in Follow up if symptoms worsen or fail to improve.       Mary Joseph NP  [facility]    Portions of this note may have been generated using voice recognition software.  Please excuse any spelling/grammatical errors. Occasional wrong-word or sound-a-like substitutions may have also occurred due to the inherent limitations of voice recognition software. Please read the chart carefully and recognize, using context, where substitutions have occurred.

## 2024-09-05 ENCOUNTER — OFFICE VISIT (OUTPATIENT)
Dept: PRIMARY CARE CLINIC | Facility: CLINIC | Age: 64
End: 2024-09-05
Payer: MEDICARE

## 2024-09-05 VITALS
HEIGHT: 63 IN | OXYGEN SATURATION: 98 % | WEIGHT: 130.75 LBS | BODY MASS INDEX: 23.17 KG/M2 | DIASTOLIC BLOOD PRESSURE: 72 MMHG | RESPIRATION RATE: 10 BRPM | SYSTOLIC BLOOD PRESSURE: 116 MMHG | HEART RATE: 81 BPM

## 2024-09-05 DIAGNOSIS — M54.50 RIGHT-SIDED LOW BACK PAIN WITHOUT SCIATICA, UNSPECIFIED CHRONICITY: Primary | ICD-10-CM

## 2024-09-05 PROCEDURE — 3008F BODY MASS INDEX DOCD: CPT | Mod: CPTII,S$GLB,,

## 2024-09-05 PROCEDURE — 1159F MED LIST DOCD IN RCRD: CPT | Mod: CPTII,S$GLB,,

## 2024-09-05 PROCEDURE — 1101F PT FALLS ASSESS-DOCD LE1/YR: CPT | Mod: CPTII,S$GLB,,

## 2024-09-05 PROCEDURE — 99214 OFFICE O/P EST MOD 30 MIN: CPT | Mod: S$GLB,,,

## 2024-09-05 PROCEDURE — 99999 PR PBB SHADOW E&M-EST. PATIENT-LVL V: CPT | Mod: PBBFAC,,,

## 2024-09-05 PROCEDURE — 3078F DIAST BP <80 MM HG: CPT | Mod: CPTII,S$GLB,,

## 2024-09-05 PROCEDURE — 3288F FALL RISK ASSESSMENT DOCD: CPT | Mod: CPTII,S$GLB,,

## 2024-09-05 PROCEDURE — 3074F SYST BP LT 130 MM HG: CPT | Mod: CPTII,S$GLB,,

## 2024-09-05 PROCEDURE — 1126F AMNT PAIN NOTED NONE PRSNT: CPT | Mod: CPTII,S$GLB,,

## 2024-09-05 PROCEDURE — 1160F RVW MEDS BY RX/DR IN RCRD: CPT | Mod: CPTII,S$GLB,,

## 2024-09-05 RX ORDER — OLANZAPINE AND SAMIDORPHAN L-MALATE 5; 10 MG/1; MG/1
1 TABLET, FILM COATED ORAL NIGHTLY
COMMUNITY
Start: 2024-08-22

## 2024-09-05 RX ORDER — METHOCARBAMOL 500 MG/1
500 TABLET, FILM COATED ORAL 3 TIMES DAILY PRN
Qty: 20 TABLET | Refills: 0 | Status: SHIPPED | OUTPATIENT
Start: 2024-09-05

## 2024-09-18 NOTE — PROGRESS NOTES
Individual Psychotherapy (PhD/LCSW)    8/21/2024    Site:  [facility]         Therapeutic Intervention: Met with patient.  Outpatient - Insight oriented psychotherapy 60 min - CPT code 47348, Outpatient - Behavior modifying psychotherapy 60 min - CPT code 87745, Outpatient - Supportive psychotherapy 60 min - CPT Code 65835 and Outpatient - Interactive psychotherapy 60 min - CPT code 54543    Chief complaint/reason for encounter: depression, mood swings, anxiety, behavior and interpersonal.     Interval history and content of current session: Pt was last seen 2-weeks ago. .Pt reports she is looking forward to doing things. Discussed recent events shared with her grandchildren- how she is helping them getting ready for school. Pt reports she continues to have intermittent negative thoughts but they are not as overly critical of self and still struggles with being overly focussed on what others (particularly the family thinks of her) Pt reports she is sleeping okay- less agitation- no pressured speech. Pt continues to work with her psychiatrist.  Pt reports she has still not been able to return to [employer]. Pt continues to see grandchildren.     Pt discussed recent visits with her grandchildren and has finally decided she citlaly benefit from getting extra help with watching all the kids when they come over especially now that there are two additional cousins that are close in age who also come over.   Pt continues to discuss all of her commitments with her grandchildren & caring for her sister-in-law who is diagnosed with a stage IV cancer and is enrolled in a clinical trial.      Pt reports that continues to have her grandchildren over on Tues afternoons and Saturdays.  continues to express worry about the future and how she is going to manage everything-caring for her family.  . Pt reports- continues to be very involved with her family overall it is going better.  Pt stated she and her  and continue to  stay connected to her close friends.  Insight/Judgement: adequate. Denies any SI/HI, NO A/V/H.   Focus: self care-managing her depression sx's- exploring her anger about her daughter and fear of the future with her 's well being.        Treatment plan:  Target symptoms: distractibility, lack of focus, anxiety, recurrent depression, mood disorder, confusion, adjustment, family stress such as: caring for grandchildren,  is blind, sister-in-law dx'd with stage IV cancer-is her care taker and transports to all appts, legal issues with adoption and dealing with her daughter who is an addict.   Why chosen therapy is appropriate versus another modality: relevant to diagnosis, patient responds to this modality, evidence based practice  Outcome monitoring methods: self-report, observation, feedback from family  Therapeutic intervention type: insight oriented psychotherapy, behavior modifying psychotherapy, supportive psychotherapy, interactive psychotherapy    Risk parameters:  Patient reports no suicidal ideation  Patient reports no homicidal ideation  Patient reports no self-injurious behavior  Patient reports no violent behavior    Verbal deficits: None    Patient's response to intervention:  The patient's response to intervention is accepting.    Progress toward goals and other mental status changes:  The patient's progress toward goals is  progressing.  .    Diagnosis:     ICD-10-CM  PL            1. Bipolar disorder, current episode mixed, mild F31.61 296.61    2. HUMPHREY (generalized anxiety disorder) F41.1 300.02    3. Acute stress reaction F43.0 308.9    4. Death of family member Z63.4 V62.82    5. Sudden death R99 798.1           Plan:  individual psychotherapy and medication management by physician    Return to clinic: 3-weeks    Length of Service (minutes): 60

## 2024-10-09 ENCOUNTER — OFFICE VISIT (OUTPATIENT)
Dept: PSYCHIATRY | Facility: CLINIC | Age: 64
End: 2024-10-09
Payer: MEDICARE

## 2024-10-09 DIAGNOSIS — F31.32 BIPOLAR AFFECTIVE DISORDER, CURRENTLY DEPRESSED, MODERATE: Primary | ICD-10-CM

## 2024-10-09 DIAGNOSIS — Z63.9 FAMILY PROBLEMS: ICD-10-CM

## 2024-10-09 DIAGNOSIS — F41.1 GAD (GENERALIZED ANXIETY DISORDER): ICD-10-CM

## 2024-10-09 DIAGNOSIS — F32.A DEPRESSION, UNSPECIFIED DEPRESSION TYPE: ICD-10-CM

## 2024-10-09 PROCEDURE — 99999 PR PBB SHADOW E&M-EST. PATIENT-LVL I: CPT | Mod: PBBFAC,,, | Performed by: SOCIAL WORKER

## 2024-10-09 SDOH — SOCIAL DETERMINANTS OF HEALTH (SDOH): PROBLEM RELATED TO PRIMARY SUPPORT GROUP, UNSPECIFIED: Z63.9

## 2024-10-21 PROBLEM — Z00.00 ROUTINE GENERAL MEDICAL EXAMINATION AT A HEALTH CARE FACILITY: Status: RESOLVED | Noted: 2024-07-18 | Resolved: 2024-10-21

## 2024-11-11 NOTE — PROGRESS NOTES
Individual Psychotherapy (PhD/LCSW)    7/10/2024    Site:  [facility]         Therapeutic Intervention: Met with patient.  Outpatient - Insight oriented psychotherapy 60 min - CPT code 42833, Outpatient - Behavior modifying psychotherapy 60 min - CPT code 76823, Outpatient - Supportive psychotherapy 60 min - CPT Code 24286 and Outpatient - Interactive psychotherapy 60 min - CPT code 45799    Chief complaint/reason for encounter: depression, mood swings, anxiety, behavior and interpersonal.     Interval history and content of current session: Pt was last seen 1-month ago. Pt presents with her  for session and reports she is still doing better no darin however pt is tearful in session- and notes she has had more episodes of having a sense of dread and can't attribute this feeling to any event or situation. Pt continues to report she has been able to have her grandchildren over on Sat to have them over at her home for swimming.  Pt continues to report she will wake up in the morning and think about things from the past that she regrets pt will replay past situations with her family- continues to ruminating more over the past- feeling that she has let other's down and presents with concerns of regret over the past. Pt is still sleeping better- has continued to feel calmer however notes at times will become very overwhelmed over situations with her grandchildren- family dynamics. Pt continues to focus on negative thoughts and is overly critical of self and overly focussed on what others (particularly the family thinks of her) Pt reports she is sleeping okay- less agitation- no pressured speech. Pt continues to work with her psychiatrist.  Pt reports she has still not been able to return to [employer]. Pt continues to see grandchildren. Pt will be following up with her treating psychiatrist in the community (Dr. Mack) to discuss how she has been feeling and has also developed a tremor.     Pt discussed recent  HPI   Chief Complaint   Patient presents with    Dental Pain     Pain on both sides of the jaw, pt thinks she may have multiple infections      39-year-old female who denies significant past medical history presents emergency department today for evaluation of dental pain.  Patient tells me that she has a cracked left upper tooth and it has been cracked for several years.  She tells me that she has started to experience pain over the last 24 hours.  Patient tells me she has been taking Tylenol without any relief.  Patient tells me she is also been taking prescription medication without relief.  Patient currently rates her pain at a 10/10 on the pain scale.  Patient also tells me that she feels like it is possibly infected.  She has noted drainage on the other side of her mouth from a previous dental infection.    History provided by:  Patient   used: No            Patient History   Past Medical History:   Diagnosis Date    Asthma ([facility])     Hypertension      No past surgical history on file.  No family history on file.  Social History     Tobacco Use    Smoking status: Not on file    Smokeless tobacco: Not on file   Substance Use Topics    Alcohol use: Not on file    Drug use: Not on file       Physical Exam   ED Triage Vitals   Temperature Heart Rate Respirations BP   08/13/24 1114 08/13/24 1114 08/13/24 1114 08/13/24 1116   36.7 °C (98.1 °F) 67 17 (!) 184/113      Pulse Ox Temp Source Heart Rate Source Patient Position   08/13/24 1114 08/13/24 1114 08/13/24 1114 --   97 % Temporal Monitor       BP Location FiO2 (%)     -- --             Physical Exam  Vitals and nursing note reviewed.   Constitutional:       Appearance: Normal appearance.   HENT:      Head: Normocephalic and atraumatic.      Mouth/Throat:      Mouth: Mucous membranes are moist.      Dentition: Abnormal dentition. Dental caries present.      Pharynx: Oropharynx is clear. Uvula midline.      Comments: Cracked left upper  tooth  Cardiovascular:      Rate and Rhythm: Normal rate.      Pulses: Normal pulses.   Pulmonary:      Effort: Pulmonary effort is normal.   Skin:     Capillary Refill: Capillary refill takes less than 2 seconds.   Neurological:      General: No focal deficit present.      Mental Status: She is alert and oriented to person, place, and time.   Psychiatric:         Mood and Affect: Mood normal.         Behavior: Behavior normal.           ED Course & MDM   Diagnoses as of 08/13/24 1213   Pain due to dental caries                 No data recorded     Kyle Coma Scale Score: 15 (08/13/24 1116 : Lisset Joe RN)                           Medical Decision Making    Patient seen exam emergency department; patient is healthy nontoxic appearing acute distress.  Clinical exam significant for a cracked left upper tooth.  No obvious abscess or drainage noted.  Patient does have some swelling to the left left cheek without any obvious area of fluctuation.  Patient without trismus, uvula is midline, no posterior swelling noted.  Patient is able to maintain her own secretions.  Patient given dental rolls while in the emergency department to treat her pain.  Patient discharged home with prescription for clindamycin, NSAIDs, and short course of narcotics.  Patient given referral to [facility] and dentistry for follow-up.  Patient given return parameters which she verbalized understanding of.  All patient's questions and concerns were addressed prior to discharge.  Patient discharged in stable condition.    Procedure  Procedures     JULITO Darby-CNP  08/13/24 6727     visits with her grandchildren and has finally decided she would benefit from getting extra help with watching all the kids when they come over especially now that there are two additional cousins that are close in age who also come over.   Pt continues to discuss all of her commitments with her grandchildren & caring for her sister-in-law who is diagnosed with a stage IV cancer and is enrolled in a clinical trial.      Pt reports that continues to have her grandchildren over on Tues afternoons and Saturdays.  continues to express worry about the future and how she is going to manage everything-caring for her family.  . Pt reports- continues to be very involved with her family overall it is going better.  Pt stated she and her  and continue to stay connected to her close friends.  Insight/Judgement: adequate. Denies any SI/HI, NO A/V/H.   Focus: self care-managing her depression sx's- exploring her anger about her daughter and fear of the future with her 's well being.        Treatment plan:  Target symptoms: distractibility, lack of focus, anxiety, recurrent depression, mood disorder, confusion, adjustment, family stress such as: caring for grandchildren,  is blind, sister-in-law dx'd with stage IV cancer-is her care taker and transports to all appts, legal issues with adoption and dealing with her daughter who is an addict.   Why chosen therapy is appropriate versus another modality: relevant to diagnosis, patient responds to this modality, evidence based practice  Outcome monitoring methods: self-report, observation, feedback from family  Therapeutic intervention type: insight oriented psychotherapy, behavior modifying psychotherapy, supportive psychotherapy, interactive psychotherapy    Risk parameters:  Patient reports no suicidal ideation  Patient reports no homicidal ideation  Patient reports no self-injurious behavior  Patient reports no violent behavior    Verbal deficits:  None    Patient's response to intervention:  The patient's response to intervention is accepting.    Progress toward goals and other mental status changes:  The patient's progress toward goals is  progressing.  .    Diagnosis:     ICD-10-CM  PL            1. Bipolar disorder, current episode mixed, mild F31.61 296.61    2. HUMPHREY (generalized anxiety disorder) F41.1 300.02    3. Acute stress reaction F43.0 308.9    4. Death of family member Z63.4 V62.82    5. Sudden death R99 798.1           Plan:  individual psychotherapy and medication management by physician    Return to clinic: 1- month    Length of Service (minutes): 60

## 2024-11-14 NOTE — PROGRESS NOTES
Individual Psychotherapy (PhD/LCSW)    10/9/2024    Site:  [facility]         Therapeutic Intervention: Met with patient.  Outpatient - Insight oriented psychotherapy 60 min - CPT code 79041, Outpatient - Behavior modifying psychotherapy 60 min - CPT code 43581, Outpatient - Supportive psychotherapy 60 min - CPT Code 88509 and Outpatient - Interactive psychotherapy 60 min - CPT code 68506    Chief complaint/reason for encounter: depression, mood swings, anxiety, behavior and interpersonal.     Interval history and content of current session: Pt was last seen 2-weeks ago. .Pt reports she is looking forward to doing things. Discussed recent events shared with her grandchildren-. Pt reports she continues to have intermittent negative thoughts but they are not as overly critical of self and still struggles with being overly focussed on what others (particularly the family thinks of her) Pt reports she is sleeping okay- less agitation- no pressured speech. Pt continues to work with her psychiatrist.  Pt reports she has still not been able to return to [employer]. Pt continues to see grandchildren.     Pt discussed recent visits with her grandchildren and has finally decided she citlaly benefit from getting extra help with watching all the kids when they come over especially now that there are two additional cousins that are close in age who also come over.   Pt continues to discuss all of her commitments with her grandchildren & caring for her sister-in-law who is diagnosed with a stage IV cancer and is enrolled in a clinical trial.      Pt reports that continues to have her grandchildren over on Tues afternoons and Saturdays.  continues to express worry about the future and how she is going to manage everything-caring for her family.  . Pt reports- continues to be very involved with her family overall it is going better.  Pt stated she and her  and continue to stay connected to her close friends.   Insight/Judgement: adequate. Denies any SI/HI, NO A/V/H.     Focus: self care-managing her depression sx's- exploring her anger about her daughter and fear of the future with her 's well being.        Treatment plan:  Target symptoms: distractibility, lack of focus, anxiety, recurrent depression, mood disorder, confusion, adjustment, family stress such as: caring for grandchildren,  is blind, sister-in-law dx'd with stage IV cancer-is her care taker and transports to all appts, legal issues with adoption and dealing with her daughter who is an addict.   Why chosen therapy is appropriate versus another modality: relevant to diagnosis, patient responds to this modality, evidence based practice  Outcome monitoring methods: self-report, observation, feedback from family  Therapeutic intervention type: insight oriented psychotherapy, behavior modifying psychotherapy, supportive psychotherapy, interactive psychotherapy    Risk parameters:  Patient reports no suicidal ideation  Patient reports no homicidal ideation  Patient reports no self-injurious behavior  Patient reports no violent behavior    Verbal deficits: None    Patient's response to intervention:  The patient's response to intervention is accepting.    Progress toward goals and other mental status changes:  The patient's progress toward goals is  progressing.  .    Diagnosis:     ICD-10-CM  PL            1. Bipolar disorder, current episode mixed, mild F31.61 296.61    2. HUMPHREY (generalized anxiety disorder) F41.1 300.02    3. Acute stress reaction F43.0 308.9    4. Death of family member Z63.4 V62.82    5. Sudden death R99 798.1           Plan:  individual psychotherapy and medication management by physician    Return to clinic: 3-weeks    Length of Service (minutes): 60

## 2024-12-09 ENCOUNTER — OFFICE VISIT (OUTPATIENT)
Dept: OPHTHALMOLOGY | Facility: CLINIC | Age: 64
End: 2024-12-09
Payer: MEDICARE

## 2024-12-09 DIAGNOSIS — H25.13 NUCLEAR SCLEROTIC CATARACT OF BOTH EYES: ICD-10-CM

## 2024-12-09 DIAGNOSIS — H40.1194 PRIMARY OPEN-ANGLE GLAUCOMA, INDETERMINATE STAGE, UNSPECIFIED LATERALITY: Primary | ICD-10-CM

## 2024-12-09 DIAGNOSIS — H40.023 OPEN ANGLE WITH BORDERLINE FINDINGS AND HIGH GLAUCOMA RISK IN BOTH EYES: Primary | ICD-10-CM

## 2024-12-09 PROCEDURE — 76514 ECHO EXAM OF EYE THICKNESS: CPT | Mod: S$GLB,,, | Performed by: OPHTHALMOLOGY

## 2024-12-09 PROCEDURE — 92250 FUNDUS PHOTOGRAPHY W/I&R: CPT | Mod: S$GLB,,, | Performed by: OPHTHALMOLOGY

## 2024-12-09 PROCEDURE — 99204 OFFICE O/P NEW MOD 45 MIN: CPT | Mod: S$GLB,,, | Performed by: OPHTHALMOLOGY

## 2024-12-09 PROCEDURE — 92020 GONIOSCOPY: CPT | Mod: S$GLB,,, | Performed by: OPHTHALMOLOGY

## 2024-12-09 PROCEDURE — 99999 PR PBB SHADOW E&M-EST. PATIENT-LVL III: CPT | Mod: PBBFAC,,, | Performed by: OPHTHALMOLOGY

## 2024-12-09 RX ORDER — LATANOPROST 50 UG/ML
1 SOLUTION/ DROPS OPHTHALMIC NIGHTLY
Qty: 7.5 ML | Refills: 4 | Status: SHIPPED | OUTPATIENT
Start: 2024-12-09

## 2024-12-11 PROBLEM — H40.023 OPEN ANGLE WITH BORDERLINE FINDINGS AND HIGH GLAUCOMA RISK IN BOTH EYES: Status: ACTIVE | Noted: 2024-12-11

## 2024-12-11 PROBLEM — H25.13 NUCLEAR SCLEROTIC CATARACT OF BOTH EYES: Status: ACTIVE | Noted: 2024-12-11

## 2024-12-11 NOTE — PROGRESS NOTES
HPI     Glaucoma     Additional comments: Glaucoma Eval- Self           Comments    Dx Glaucoma about 1 year by Dr. [facility] Her    is a pt of Dr. Aviles(Jimbo Jo).  Her va seems to be doing very well in OU. No prior h/o any eye problems or   eye sx.    Latanorpost QHS OU            Last edited by Germán Gill on 12/9/2024  8:52 AM.            Assessment /Plan     For exam results, see Encounter Report.    Open angle with borderline findings and high glaucoma risk in both eyes  -     Posterior Segment OCT Optic Nerve- Both eyes; Future  -     Sam Visual Field - OU - Extended - Both Eyes; Future  -     Color Fundus Photography - OU - Both Eyes    Nuclear sclerotic cataract of both eyes        Dr Espinal started drops     Wife of Jimbo Jo    POAG Suspect  Cupping  No FMHx Glc // Blindness    Unknown pre-Tx IOP    CCT  508 // 491    < 18 --> TBA    Both eyes --> tolerating well & good adherence --> CSM  Xal q day    NSC OU  CE PRN discussed  Observe    12/09/2024          Plan  Obtain outside records  RTC 4 months IOP & HVF & OCT RNFL --> with   RTC sooner prn with good understanding

## 2024-12-30 ENCOUNTER — TELEPHONE (OUTPATIENT)
Dept: OPHTHALMOLOGY | Facility: CLINIC | Age: 64
End: 2024-12-30
Payer: MEDICARE

## 2024-12-30 NOTE — TELEPHONE ENCOUNTER
----- Message from Vamshi sent at 12/30/2024  9:45 AM CST -----  Regarding: appointment sccess  Contact: [phone]  Pt  is calling because ptwas playing with her grand kids and bent down when pt came back up pt was seeing double .Please contact pt     Kimberly Jo   [phone]

## 2024-12-30 NOTE — TELEPHONE ENCOUNTER
Spoke to pt at the request of Dr. Echevarria   Advised that work up in the ED was negative.   Advised that if it happens again, she should go to the ED, and DR. Echevarria would have her follow up with Dr. Cody Lyons.   Verbalized understanding.

## 2025-01-30 ENCOUNTER — TELEPHONE (OUTPATIENT)
Dept: OPHTHALMOLOGY | Facility: CLINIC | Age: 65
End: 2025-01-30
Payer: MEDICARE

## 2025-01-30 NOTE — TELEPHONE ENCOUNTER
"Contact pt- scheduled appointment with Dr. Ta for a Cataract Evaluation.    ----- Message from Miller sent at 1/30/2025 11:42 AM CST -----  Consult/Advisory    Name Of Caller: Jimbo Jo (Spouse)    Contact Preference?: [phone]    Provider Name: Wale    What is the nature of the call?: Requesting referral to see Dr. Ta      Additional Notes: Stating he believes Dr. Ta is who Dr. Echevarria was recommending the pt see    "Thank you for all that you do for our patients"  "

## 2025-02-03 ENCOUNTER — TELEPHONE (OUTPATIENT)
Dept: OPHTHALMOLOGY | Facility: CLINIC | Age: 65
End: 2025-02-03
Payer: MEDICARE

## 2025-02-03 NOTE — TELEPHONE ENCOUNTER
Contacted pt- informed pt that she is on a cancellation list with Dr. Ta for a cataract evaluation and will scheduled sooner if someone cancels.    ----- Message from Hermelindo Cisse sent at 1/30/2025  4:40 PM CST -----    ----- Message -----  From: Rachna Castillo  Sent: 1/30/2025   3:50 PM CST  To: Wale PLEITEZ Staff      ----- Message -----  From: Simran Jaeger  Sent: 1/30/2025  11:53 AM CST  To: Keyon CRAVEN Staff    Type:  Patient Referral Call    Who Called:Jimbo   Does the patient know what this is regarding?:H40.023 (ICD-10-CM) - Open angle with borderline findings and high glaucoma risk in both eyes    Would the patient rather a call back or a response via [facility]? call  Best Call Back Number:[phone]  Additional Information:

## 2025-02-03 NOTE — TELEPHONE ENCOUNTER
----- Message from Hermelindo Tanna sent at 1/30/2025  4:40 PM CST -----    ----- Message -----  From: Rachna Castillo  Sent: 1/30/2025   3:50 PM CST  To: Wale PLEITEZ Staff      ----- Message -----  From: Simran Jaeger  Sent: 1/30/2025  11:53 AM CST  To: Keyon CRAVEN Staff    Type:  Patient Referral Call    Who Called:Jimbo   Does the patient know what this is regarding?:H40.023 (ICD-10-CM) - Open angle with borderline findings and high glaucoma risk in both eyes    Would the patient rather a call back or a response via [facility]? call  Best Call Back Number:[phone]  Additional Information:

## 2025-02-19 ENCOUNTER — OFFICE VISIT (OUTPATIENT)
Dept: PSYCHIATRY | Facility: CLINIC | Age: 65
End: 2025-02-19
Payer: MEDICARE

## 2025-02-19 DIAGNOSIS — Z63.9 FAMILY PROBLEMS: ICD-10-CM

## 2025-02-19 DIAGNOSIS — F41.1 GAD (GENERALIZED ANXIETY DISORDER): ICD-10-CM

## 2025-02-19 DIAGNOSIS — F31.32 BIPOLAR AFFECTIVE DISORDER, CURRENTLY DEPRESSED, MODERATE: Primary | ICD-10-CM

## 2025-02-19 SDOH — SOCIAL DETERMINANTS OF HEALTH (SDOH): PROBLEM RELATED TO PRIMARY SUPPORT GROUP, UNSPECIFIED: Z63.9

## 2025-02-20 NOTE — PROGRESS NOTES
Individual Psychotherapy (PhD/LCSW)    2/19/2025    Site:  [facility]         Therapeutic Intervention: Met with patient.  Outpatient - Insight oriented psychotherapy 60 min - CPT code 35599, Outpatient - Behavior modifying psychotherapy 60 min - CPT code 69149, Outpatient - Supportive psychotherapy 60 min - CPT Code 82405 and Outpatient - Interactive psychotherapy 60 min - CPT code 45354    Chief complaint/reason for encounter: depression, mood swings, anxiety, behavior and interpersonal.     Interval history and content of current session: Pt was last seen 2-weeks ago. Pt continues to present better than in previous sessions however pt reports currently she is struggling more with depression sx's than anxiety. Pt reports since she resumed her lamictal it has helped. Pt reports she stopped using the marijuana gummies for anxiety did not like how it made her feel. Pt reports she is looking forward to doing things. Pt discussed recent events shared with her grandchildren-. Pt reports she struggles more in the morning tends to ruminate on past events describes feeling overwhelmed with guilt on how she thinks she has let other's down and states she has guilt for not being able to manage things and be reliable and that her  (jacob) has to handle most things. Pt reports she is sleeping okay- no pressured speech- presents calmer. Pt continues to work with her psychiatrist (Dr. Lourdes Mack).  Pt reports she has still not been able to return to [employer]. Pt continues to see grandchildren. Pt reports she has not resumed walking in the morning, sitting out of pottery for a while, no gardening. Pt has been retreating to her couch and watching movies more.    Pt continues to discuss all of her commitments with her grandchildren & caring for her sister-in-law who is diagnosed with a stage IV cancer and is enrolled in a clinical trial.      Pt  continues to express worry about the future and how she is going to  manage everything-caring for her family.  . Pt reports- continues to be very involved with her family overall it is going better.  Pt stated she and her  and continue to stay connected to her close friends.  Insight/Judgement: adequate. Denies any SI/HI, NO A/V/H.     Focus: self care-managing her depression sx's- fear of the future with her 's well being.        Treatment plan:  Target symptoms: distractibility, lack of focus, anxiety, recurrent depression, mood disorder, confusion, adjustment, family stress such as: caring for grandchildren,  is blind, sister-in-law dx'd with stage IV cancer-is her care taker and transports to all appts, legal issues with adoption and dealing with her daughter who is an addict.   Why chosen therapy is appropriate versus another modality: relevant to diagnosis, patient responds to this modality, evidence based practice  Outcome monitoring methods: self-report, observation, feedback from family  Therapeutic intervention type: insight oriented psychotherapy, behavior modifying psychotherapy, supportive psychotherapy, interactive psychotherapy    Risk parameters:  Patient reports no suicidal ideation  Patient reports no homicidal ideation  Patient reports no self-injurious behavior  Patient reports no violent behavior    Verbal deficits: None    Patient's response to intervention:  The patient's response to intervention is accepting.    Progress toward goals and other mental status changes:  The patient's progress toward goals is  progressing.  .    Diagnosis:     ICD-10-CM  PL            1. Bipolar disorder, current episode mixed, mild F31.61 296.61    2. HUMPHREY (generalized anxiety disorder) F41.1 300.02    3. Acute stress reaction F43.0 308.9    4. Death of family member Z63.4 V62.82    5. Sudden death R99 798.1           Plan:  individual psychotherapy and medication management by physician    Return to clinic: 3-weeks    Length of Service (minutes):  60

## 2025-03-17 ENCOUNTER — OFFICE VISIT (OUTPATIENT)
Dept: OPHTHALMOLOGY | Facility: CLINIC | Age: 65
End: 2025-03-17
Payer: MEDICARE

## 2025-03-17 DIAGNOSIS — H52.7 REFRACTIVE ERROR: ICD-10-CM

## 2025-03-17 DIAGNOSIS — H40.023 OPEN ANGLE WITH BORDERLINE FINDINGS AND HIGH GLAUCOMA RISK IN BOTH EYES: ICD-10-CM

## 2025-03-17 DIAGNOSIS — H50.50 PHORIA: Primary | ICD-10-CM

## 2025-03-17 DIAGNOSIS — H25.13 NUCLEAR SCLEROTIC CATARACT OF BOTH EYES: ICD-10-CM

## 2025-03-17 PROCEDURE — 1159F MED LIST DOCD IN RCRD: CPT | Mod: CPTII,S$GLB,, | Performed by: OPHTHALMOLOGY

## 2025-03-17 PROCEDURE — 1101F PT FALLS ASSESS-DOCD LE1/YR: CPT | Mod: CPTII,S$GLB,, | Performed by: OPHTHALMOLOGY

## 2025-03-17 PROCEDURE — 92014 COMPRE OPH EXAM EST PT 1/>: CPT | Mod: S$GLB,,, | Performed by: OPHTHALMOLOGY

## 2025-03-17 PROCEDURE — 3288F FALL RISK ASSESSMENT DOCD: CPT | Mod: CPTII,S$GLB,, | Performed by: OPHTHALMOLOGY

## 2025-03-17 PROCEDURE — 99999 PR PBB SHADOW E&M-EST. PATIENT-LVL III: CPT | Mod: PBBFAC,,, | Performed by: OPHTHALMOLOGY

## 2025-03-17 PROCEDURE — 1126F AMNT PAIN NOTED NONE PRSNT: CPT | Mod: CPTII,S$GLB,, | Performed by: OPHTHALMOLOGY

## 2025-03-17 PROCEDURE — 1160F RVW MEDS BY RX/DR IN RCRD: CPT | Mod: CPTII,S$GLB,, | Performed by: OPHTHALMOLOGY

## 2025-03-17 NOTE — PROGRESS NOTES
Subjective:       Patient ID: Kimberly Jo is a 65 y.o. female.    Chief Complaint: Cataract (Patient Kimberly Jo is a 65 year old female.)    HPI     Cataract     Additional comments: Patient Kimberly Jo is a 65 year old female.           Comments    Pt referred by Dr. Echevarria for cataract evaluation. Pt states cloudy VA   since visit with Dr. Echevarria. Pt states that she 2 episodes of double   vision (12/22/2024 and 01/15/2025) and had to go to [facility].   Pt states that December episode lasted for 45 minutes and January episode   lasted 10 minutes. Pt denies any eye pain.    DLS: 12/09/2024 with Dr. Echevarria    Meds: Latanoprost qhs OU    POHx:  1. Open angle with borderline findings and high glaucoma risk in both eyes  2. Nuclear sclerotic cataract of both eyes            Last edited by Nalini Chance on 3/17/2025  9:12 AM.             Assessment:       1. Phoria    2. Open angle with borderline findings and high glaucoma risk in both eyes    3. Nuclear sclerotic cataract of both eyes    4. Refractive error        Plan:       Phoria-Most likely esophoria or exophoria given Pt's hx/o transient horizontal diplopia x 2 episodes.  Glaucoma suspect OU-IOP's are acceptable OU & ON's appear stable OU. Followed by Dr Echevarria.  Cataracts- Not visually significant.  RE      RTC me prn.  RTC dr Echevarria as scheduled in 1 month.

## 2025-04-01 ENCOUNTER — TELEPHONE (OUTPATIENT)
Dept: PRIMARY CARE CLINIC | Facility: CLINIC | Age: 65
End: 2025-04-01
Payer: MEDICARE

## 2025-04-01 DIAGNOSIS — Z79.899 ENCOUNTER FOR LONG-TERM (CURRENT) USE OF MEDICATIONS: Primary | ICD-10-CM

## 2025-04-01 DIAGNOSIS — Z12.31 OTHER SCREENING MAMMOGRAM: Primary | ICD-10-CM

## 2025-04-01 NOTE — TELEPHONE ENCOUNTER
----- Message from Yamel sent at 4/1/2025  1:31 PM CDT -----  Contact: [phone]  Caller is requesting to schedule their annual screening mammogram appointment. Order is not listed in Epic.  Please enter order and contact patient to schedule.Would the patient like a call back, or a response through their [facility] portal?:   CALLWhere would they like the mammogram performed?: LTRComments:

## 2025-04-01 NOTE — TELEPHONE ENCOUNTER
----- Message from Yamel sent at 4/1/2025  1:26 PM CDT -----  Contact: [phone]  type: Constanza is requesting to schedule their Lab appointment prior to an appointment.Order is not listed in EPIC.  Please enter order and contact patient to schedule.Preferred Date and Time of Labs:  7/15/25 @ 10 amDate of Appointment:7/22/25Where would they like the lab performed? LTR Would the patient rather a call back or a response via [facility]? CALL

## 2025-04-14 ENCOUNTER — OFFICE VISIT (OUTPATIENT)
Dept: OPHTHALMOLOGY | Facility: CLINIC | Age: 65
End: 2025-04-14
Payer: MEDICARE

## 2025-04-14 ENCOUNTER — CLINICAL SUPPORT (OUTPATIENT)
Dept: OPHTHALMOLOGY | Facility: CLINIC | Age: 65
End: 2025-04-14
Payer: MEDICARE

## 2025-04-14 DIAGNOSIS — H40.023 OPEN ANGLE WITH BORDERLINE FINDINGS AND HIGH GLAUCOMA RISK IN BOTH EYES: Primary | ICD-10-CM

## 2025-04-14 DIAGNOSIS — H25.13 NUCLEAR SCLEROTIC CATARACT OF BOTH EYES: ICD-10-CM

## 2025-04-14 PROCEDURE — 1159F MED LIST DOCD IN RCRD: CPT | Mod: CPTII,S$GLB,, | Performed by: OPHTHALMOLOGY

## 2025-04-14 PROCEDURE — 99999 PR PBB SHADOW E&M-EST. PATIENT-LVL III: CPT | Mod: PBBFAC,,, | Performed by: OPHTHALMOLOGY

## 2025-04-14 PROCEDURE — 1160F RVW MEDS BY RX/DR IN RCRD: CPT | Mod: CPTII,S$GLB,, | Performed by: OPHTHALMOLOGY

## 2025-04-14 PROCEDURE — 92133 CPTRZD OPH DX IMG PST SGM ON: CPT | Mod: S$GLB,,, | Performed by: OPHTHALMOLOGY

## 2025-04-14 PROCEDURE — 99214 OFFICE O/P EST MOD 30 MIN: CPT | Mod: S$GLB,,, | Performed by: OPHTHALMOLOGY

## 2025-04-14 PROCEDURE — 92020 GONIOSCOPY: CPT | Mod: S$GLB,,, | Performed by: OPHTHALMOLOGY

## 2025-04-14 PROCEDURE — 92083 EXTENDED VISUAL FIELD XM: CPT | Mod: S$GLB,,, | Performed by: OPHTHALMOLOGY

## 2025-04-14 PROCEDURE — G2211 COMPLEX E/M VISIT ADD ON: HCPCS | Mod: S$GLB,,, | Performed by: OPHTHALMOLOGY

## 2025-04-14 NOTE — PROGRESS NOTES
oct/hvf ou done/ou/rel/fix/coop.good/patient chart for allergies/od.+1.25 +0.50 x8/os.+0.75 +0.75 x150/ej.        Assessment /Plan     For exam results, see Encounter Report.    There are no diagnoses linked to this encounter.

## 2025-04-16 NOTE — PROGRESS NOTES
HPI     Glaucoma     Additional comments: 4 mon iop chk/hvf rev/oct           Comments    Pt states her va seems to be doing well in OU.    Open angle with borderline findings and high glaucoma risk in both eyes  Phoria   Nuclear sclerotic cataract of both eyes    Latanoprost QHS OU          Last edited by Germán Gill on 4/14/2025 10:44 AM.            Assessment /Plan     For exam results, see Encounter Report.    Open angle with borderline findings and high glaucoma risk in both eyes  -     Sam Visual Field - OU - Extended - Both Eyes  -     Posterior Segment OCT Optic Nerve- Both eyes    Nuclear sclerotic cataract of both eyes        Dr Espinal started drops     Wife of Jimbo Jo    12/22/2024  Transient diplopia // blurred Va  No other neuro symptoms  Resolved p 1 hour  Had eval at [facility] ER  CT Scan sc Contrast unremarkable    POAG Suspect  Cupping  No FMHx Glc // Blindness    Unknown pre-Tx IOP    CCT  508 // 491    < 18 --> TBA    Both eyes --> tolerating well & good adherence --> CSM  Xal q day    NSC OU  CE PRN discussed  Observe    12/09/2024          Plan  If recurrent Diplopia then consider MRI and Neuro // CVA eval discussed --> ER  RTC 5 months IOP & adherence  --> with   RTC sooner prn with good understanding

## 2025-04-16 NOTE — PROGRESS NOTES
"  ENCOUNTER DATE: 2/11/19    SITE: [facility]    INSIGHT ORIENTED PSYCHOTHERAPY: 11443 45-50 min    Met with patient .    ENCOUNTER REASON/CHIEF COMPLAINT(symptom, problem, diagnosis, follow-up, reason for encounter):   The patient presents for follow up for coping skills to better manage her anxiety, depression and stress. pt. struggles with self regulation of her emotional states.     INTERVAL EVENTS: Pt was last seen 1 month ago for session. Pt presents alone for session. Pt reports to have a list of concerns she would like to review with clinician. Pt is noted having  more rapid & slightly pressured speech. Pt reports she has felt more anxiety lately and struggles with not being able to focus on setting and or committing to a more structured schedule for herself.  Pt continues to report she is tired of being on her medication and would prefer to be off of it because she would like to "feel more productive or better".  Clinician continues to advise pt of the dangers of stopping her meds without discussing it with her prescribing provider (Dr. Lourdes Mack).  Pt agreed she would not do anything without discussing it with her psychiatrist. Pt's  was also participating in this discussion and agreed that she should not stop her medication.     Pt continues to discuss several areas of regret surrounding how things have turned out with family relationships and severed family ties. Additionally-Pt continues to discuss her feelings about her sister-in-law who is undergoing treatment for stage iv colan cancer. Pt continues to report she is having a hard time watching her sister-in-law go through her treatments. Pt continues discuss her frustration with her daughter and the state of influx her daughter seems to be in her living arrangement with her boyfriend. Pt reports she has been worrying more about everything has had more racing thoughts.  Pt also reports she continues to have regular contact with her friends " and is getting to participate in Yoga, Pottery and having regular get together's with her friends.  Pt reports she continues to be very involved in her granddaughter's activities with school. Pt continues to report her granddaughter's father's hearing has been postponed. Pt reports she is happy to see her granddaughter's father is doing a lot more with her seems to be making all events etc. Pt reports the arrangement for caring for her mother seems to be working out between pt and her siblings.  Clinician continues to work with pt to reframe concerns in practical ways that can be better addressed to alleviate her concerns.  Clinician advised pt to follow up with her treating psychiatrist to asses medications. Clinician continues to work with pt on utilizing cbt skills to better manage and tolerate distress and improve interpersonal relationship skills.   Clinician continues to work with pt on self acceptance, being more realistic with her self and others and working on identifying her excessive guilt especially as it relates to her family e.g. Relationship with children.  Clinician continues to challenge pt to work on having more structure in her daily scheduled and to work on being mindful of how much negative thinking she is dwelling on rather engaging in positive activities & focus on the present. Additionally, clinician suggested to pt with  to consider taking a small vacation and taking a break from her stressors with the family. Pt's  was receptive to this decision.  Pt continues to report she has been compliant with medications since her last session.     Mood:less- anxious/ more depressed,  Affect:appropriate to circumstance, TP: circumstantial,tangential ,slight- rapid & pressured speech,  (+)racing thoughts, paranoid thinking, agitation, mood swings,(+) tearful episodes (+) worry,(+) obsessive & intrusive thoughts, ruminates on past events that tend to be negative with her adult  children,Sleep: improved, No SI,insight/judgement: fair/improving.  Pt continues to present with automatic negative thoughts about her self continues to have insecurities, ego dystonic and continues to express extreme guilt over her lack of involvement with her mother.  Clinician continues to challenge these negative thoughts, focus on challenge statements, worked on mindfulness become of aware of her distortions that contribute to her feelings anxious and depressed. Pt continues to discuss stress mgmt coping skills, family dynamics. Pt continues to struggle with interpersonal relationships and family dynamics.      TREATMENT PLAN:     No changes in treatment plan from chart note of (date): 1/26/2012    Target Symptoms: Mood swings,depression, anxiety, irritability,anergiam anhedonia, affective lability, poor interpersonal relationships, co dependent bx's, guilt and racing thoughts, no motivation, frequent tearful episodes, cognitive distortions, paranoid & hopeless.    Therapeutic Intervention type:   Support  Behavior Modification  Medication Mgmt  Why chosen therapy is appropriate versus another modality:  Relevant to diagnosis:evidenced based.  Patient responds to this modality  Outcome monitoring methods:  Self-Report  Observation    RISK PARAMETERS:     Patient reports no suicidal ideation.  Patient reports no homicidal ideation.  Patient reports no self injurious behavior.  Patient reports no violent behavior.    PATIENT'S RESPONSE TO INTERVENTION:   The patient's response to intervention is accepting.    PROGRESS TOWARD GOALS AND OTHER MENTAL STATUS CHANGES:   The patient's progress toward goals is poor.    DIAGNOSIS  Bipolar DO, most recent manic  (296.52)  HUMPHREY (300.02)  Personality NOS (301.89)  Family Circumstance NOS/Caring for an aging parent with dementia (V61.3)    GLOBAL ASSESSMENT OF FUNCTION SCALE:  The patient's GAF is 70    PLAN:   Pt. should start to attend individual therapy once a week.    Medication Management: Pt is currently being followed by Dr. Mack.   See physician notes.  Return to clinic in 2-4 weeks.             How Severe Is Your Condition?: moderate

## 2025-04-21 ENCOUNTER — OFFICE VISIT (OUTPATIENT)
Dept: PSYCHIATRY | Facility: CLINIC | Age: 65
End: 2025-04-21
Payer: MEDICARE

## 2025-04-21 DIAGNOSIS — F32.A DEPRESSION, UNSPECIFIED DEPRESSION TYPE: ICD-10-CM

## 2025-04-21 DIAGNOSIS — Z63.9 FAMILY PROBLEMS: ICD-10-CM

## 2025-04-21 DIAGNOSIS — F41.1 GAD (GENERALIZED ANXIETY DISORDER): ICD-10-CM

## 2025-04-21 DIAGNOSIS — F31.32 BIPOLAR AFFECTIVE DISORDER, CURRENTLY DEPRESSED, MODERATE: Primary | ICD-10-CM

## 2025-04-21 PROCEDURE — 99999 PR PBB SHADOW E&M-EST. PATIENT-LVL I: CPT | Mod: PBBFAC,,, | Performed by: SOCIAL WORKER

## 2025-04-21 SDOH — SOCIAL DETERMINANTS OF HEALTH (SDOH): PROBLEM RELATED TO PRIMARY SUPPORT GROUP, UNSPECIFIED: Z63.9

## 2025-04-22 NOTE — PROGRESS NOTES
"  Individual Psychotherapy (PhD/LCSW)    4/21/2025    Site:  [facility]         Therapeutic Intervention: Met with patient.  Outpatient - Insight oriented psychotherapy 60 min - CPT code 54606, Outpatient - Behavior modifying psychotherapy 60 min - CPT code 39653, Outpatient - Supportive psychotherapy 60 min - CPT Code 15949 and Outpatient - Interactive psychotherapy 60 min - CPT code 64162    Chief complaint/reason for encounter: depression, mood swings, anxiety, behavior and interpersonal.     Interval history and content of current session:      Subjective:  Patient was last seen two weeks ago and reports improvement in mood and functioning compared to prior sessions. However, she notes that depressive symptoms currently outweigh anxiety symptoms. Since resuming Lamotrigine (Lamictal), she reports a stabilizing effect on mood. She discontinued the use of marijuana gummies for anxiety, stating she did not like how they made her feel.    Patient shared that she had a pleasant time recently with her grandchildren and continues to prioritize her involvement with family. She also remains a primary support for her sister-in-law, who is undergoing treatment for stage IV cancer through a clinical trial. While she finds purpose in these roles, she acknowledges feeling overwhelmed, particularly in the mornings, when she tends to ruminate on past mistakes and feels guilt about being "unreliable" or not managing responsibilities effectively--particularly in comparison to her , Jimbo, who she says "handles most things."    Despite this, patient reports sleeping adequately, denies any pressured speech, and appears calmer in affect. She continues to work with her psychiatrist, Dr. Lourdes Mack, and remains med-compliant.    Patient has returned to regular Voodoo attendance, resumed walking, pottery, or gardening, and admits to spending more time on the couch watching movies. She describes ongoing worry about the " future, particularly regarding her 's health and her capacity to care for others. She reports maintaining good relationships with close friends and overall, family dynamics are going better.    Next Session Focus:  Revisit self-care goals, assess emotional impact of caregiving and explore strategies to manage anticipatory grief and long-term support needs.    Objective:  Patient was well-groomed and oriented to person, place, and time. Affect was calm, congruent with mood. Thought processes were logical and goal-directed. No evidence of psychosis, darin, or acute anxiety. Speech was normal in rate and tone. Insight and judgment were adequate. No suicidal ideation (SI), homicidal ideation (HI), or auditory/visual hallucinations (A/V/H) reported or observed.    Assessment:  Patient presents with Bipolar Disorder, currently with more prominent depressive features. Symptoms of Major Depressive Disorder persist, including guilt, low energy, loss of interest in previously enjoyable activities, and morning rumination. While anxiety has improved, obsessive thought patterns persist. Protective factors include family engagement, ongoing psychiatric care, insight, and social support. Patient demonstrates moderate psychological insight and shows willingness to continue treatment.    Intervention:  Supportive psychotherapy provided, with emphasis on emotional validation and exploration of self-critical thoughts.  Cognitive reframing strategies introduced to challenge guilt-based cognitions and perfectionism.  Explored grief and anticipatory anxiety around caregiving responsibilities and fear of the future.  Reinforced importance of gentle self-care, behavioral activation, and re-engaging with small, manageable activities.  Normalized fluctuating energy levels and emphasized value of pacing, boundaries, and self-compassion.  Encouraged continued medication adherence and psychiatric follow-up.    Treatment plan:    Target  symptoms: distractibility, lack of focus, anxiety, recurrent depression, mood disorder, confusion, adjustment, family stress such as: caring for grandchildren,  is blind, sister-in-law dx'd with stage IV cancer-is her care taker and transports to all appts, legal issues with adoption and dealing with her daughter who is an addict.     Treatment Plan Goals:  Alleviate depressive symptoms and reduce negative rumination.  Increase engagement in pleasurable and meaningful activities.  Strengthen coping skills for health-related anxiety and caregiving stress.  Rebuild routine and structure around self-care and physical activity.  Support continued medication compliance and collaboration with psychiatrist.      Why chosen therapy is appropriate versus another modality: relevant to diagnosis, patient responds to this modality, evidence based practice  Outcome monitoring methods: self-report, observation, feedback from family  Therapeutic intervention type: insight oriented psychotherapy, behavior modifying psychotherapy, supportive psychotherapy, interactive psychotherapy    Risk parameters:  Patient reports no suicidal ideation  Patient reports no homicidal ideation  Patient reports no self-injurious behavior  Patient reports no violent behavior    Verbal deficits: None    Patient's response to intervention:  The patient's response to intervention is accepting.    Progress toward goals and other mental status changes:  The patient's progress toward goals is  progressing.  .    Diagnosis:     ICD-10-CM  PL            1. Bipolar disorder, current episode mixed, mild F31.61 296.61    2. HUMPHREY (generalized anxiety disorder) F41.1 300.02    3. Acute stress reaction F43.0 308.9    4. Death of family member Z63.4 V62.82    5. Sudden death R99 798.1       Plan:  Continue individual therapy every 2 weeks focused on mood regulation, self-compassion, and role-related stress.  Assign reflective journaling prompt on managing  guilt and expectations.  Encourage re-engagement with 1 previously enjoyable activity (walking, pottery, or gardening).  Monitor mood fluctuations and reinforce coping strategies during caregiving demands.  Reassess functional goals and emotional well-being in next session.    individual psychotherapy and medication management by physician    Return to clinic: 3-weeks    Length of Service (minutes): 60

## 2025-05-06 ENCOUNTER — TELEPHONE (OUTPATIENT)
Dept: OPHTHALMOLOGY | Facility: CLINIC | Age: 65
End: 2025-05-06
Payer: MEDICARE

## 2025-05-06 NOTE — TELEPHONE ENCOUNTER
Contacted pt- rescheduled appointment with Dr. Echevarria.    ----- Message from Ghada sent at 5/6/2025  2:16 PM CDT -----  Regarding: Reschedule Existing Appointment  Contact: Kimberly Weller Existing Appointment Current appt date:09/16/2025 Type of appt :5 month IOP check Physician: Wale Reason for rescheduling: would like to get re scheduled for 09/10 or 11/2025. Caller:Kimberly Jo Contact Preference:[phone] (home) , requesting a call back. Note : Would also like  appt on the same day and time    Jimbo [id number]

## 2025-05-12 ENCOUNTER — OFFICE VISIT (OUTPATIENT)
Dept: PSYCHIATRY | Facility: CLINIC | Age: 65
End: 2025-05-12
Payer: MEDICARE

## 2025-05-12 VITALS
BODY MASS INDEX: 20.91 KG/M2 | WEIGHT: 118.06 LBS | SYSTOLIC BLOOD PRESSURE: 119 MMHG | HEART RATE: 86 BPM | DIASTOLIC BLOOD PRESSURE: 78 MMHG

## 2025-05-12 DIAGNOSIS — F31.32 BIPOLAR AFFECTIVE DISORDER, CURRENTLY DEPRESSED, MODERATE: Primary | ICD-10-CM

## 2025-05-12 DIAGNOSIS — F41.1 GAD (GENERALIZED ANXIETY DISORDER): ICD-10-CM

## 2025-05-12 PROCEDURE — 1159F MED LIST DOCD IN RCRD: CPT | Mod: CPTII,S$GLB,, | Performed by: PSYCHIATRY & NEUROLOGY

## 2025-05-12 PROCEDURE — 1160F RVW MEDS BY RX/DR IN RCRD: CPT | Mod: CPTII,S$GLB,, | Performed by: PSYCHIATRY & NEUROLOGY

## 2025-05-12 PROCEDURE — 3078F DIAST BP <80 MM HG: CPT | Mod: CPTII,S$GLB,, | Performed by: PSYCHIATRY & NEUROLOGY

## 2025-05-12 PROCEDURE — 3074F SYST BP LT 130 MM HG: CPT | Mod: CPTII,S$GLB,, | Performed by: PSYCHIATRY & NEUROLOGY

## 2025-05-12 PROCEDURE — 99999 PR PBB SHADOW E&M-EST. PATIENT-LVL II: CPT | Mod: PBBFAC,,, | Performed by: PSYCHIATRY & NEUROLOGY

## 2025-05-12 PROCEDURE — 99205 OFFICE O/P NEW HI 60 MIN: CPT | Mod: S$GLB,,, | Performed by: PSYCHIATRY & NEUROLOGY

## 2025-05-12 PROCEDURE — 99417 PROLNG OP E/M EACH 15 MIN: CPT | Mod: S$GLB,,, | Performed by: PSYCHIATRY & NEUROLOGY

## 2025-05-12 PROCEDURE — 3008F BODY MASS INDEX DOCD: CPT | Mod: CPTII,S$GLB,, | Performed by: PSYCHIATRY & NEUROLOGY

## 2025-05-12 RX ORDER — QUETIAPINE FUMARATE 50 MG/1
TABLET, EXTENDED RELEASE ORAL
Qty: 90 TABLET | Refills: 1 | Status: SHIPPED | OUTPATIENT
Start: 2025-05-12

## 2025-05-12 NOTE — Clinical Note
Please book patient  for 6/9   Time 1200  Length:        EP30   in person  Pt aware.  Thank you, MM

## 2025-05-12 NOTE — PROGRESS NOTES
"PSYCHIATRY ENCOUNTER  5/12/2025      SERVICE: Geriatric  ENCOUNTER: initial    CHIEF COMPLAINT: depression, anxiety    START TIME: 5/12/2025 11:54 AM  STOP TIME: 5/12/2025 1:08 PM  TOTAL ENCOUNTER TIME (in minutes): 115  TIME WITH PATIENT (in minutes): 74    -- PATIENT IDENTIFIERS: Kimberly Jo  [id number]  1960  65 y.o.  female  -- LOCATION OF PATIENT: clinic/office    -- MODE OF ARRIVAL: self-presented  -- PRESENT WITH PATIENT DURING SESSION: partner (, Jimbo, present during last 10 minutes of session.)  -- SOURCES OF INFORMATION: PATIENT, EHR/chart  -- LOCATION OF ENCOUNTER PROVIDER: [place], [place]    -- ENCOUNTER PROVIDER: Yessy Medina MD       Reason for Encounter:   Referral from CLAYTON Sheriff for anxiety    History of Present Illness:   65 y.o. female with h/o Bipolar I, MRE depressed, moderate, presents for medication mgmt.  Patient has been seeing Non-[facility] psychiatrist, Dr. Lourdes Mack, for many years.  Dr. Mack has not adjusted medications in some time and patient is seeking a second opinion for med mgmt.  Previously followed by Dr. Yañez at [facility], last seen 3/11/2015.  Currently engaged in psychotherapy with CLAYTON Sheriff.      Patient reports significant depression and anxiety that interfere with ability to function in social settings.  When forced to be social, states that she pretends to be "on" but is "falling apart inside". Patient hyperfocused on symptoms of depression and difficult to obtain information on manic episodes.  Reports significant anhedonia and amotivation.  Two months ago, patient stopped attending her anywayanyday classes.  States that she has difficulty leaving the home and has been isolating.  Energy is low.  Patient spends a significant amount of time in bed or on the sofa.  Patient states that depression has been worsening over the last year.Patient also reports significant anxiety.  States that anxiety makes it difficult for her to be around people.  States that " she only feels safe at home.  Reports that depression is so bad right now that I am alienating myself away from everybody.    Patient has a history of manic episodes, but reports limited insight into symptoms.  Patient can not recall last manic episode.  Patient was unable to state how many times a year she has a manic episode.  When manic, patient reports increased goal oriented activity, mood lability, euphoria, impulsivity, racing thoughts, and being more sexually active.  Patient states that manic episodes last approximately 1 week.  Patient is unsure whether sleep is affected.  Impulsive behaviors such as buying multiple articles of clothing and different colors occurs.  States that most impulsive behaviors are associated with shopping.      Patient states that some friends have known that she is stressed, but they were unaware of the level of depression she is coping with.  Depression was 1st diagnosed after Karoline.  Patient was on Celexa for years, however, Celexa cause prolonged QTC at 40 mg, and medication was discontinued.    Patient is currently taking Lybalvi but she is not tell that it is effective in treating depression/anxiety.  Patient feels that Lamictal is helpful.  Patient has a prescription for diazepam 5 mg p.o. t.i.d. p.r.n. anxiety.  States that she uses it at least once a day.  No adverse effects from medications reported.     present with patient's permission during last 10 minutes of evaluation to review treatment and to cooperate and clarify patient's history.    Stressors  Beating self up, especially about estranged daughter.  Estranged secondary to Daughter's addiction since 2021.  Granddaughter born 8/2020 and granddaughter in 2021, 18 months apart.  One addicted to opiates, one addicted to meth.  Patient and  originally had custody of children, however, patient's niece and  adopted children. Patient and  remain involved.    Adverse Effects:  denies  Compliance: yes     Review Of Systems:     PSYCHIATRIC ROS: see HPI  Depression rating (0 = none, 10 = worst): 10  Reports: +depressed mood, +anhedonia, +amotivation, +hopelessness, +helplessness, +worthlessness, +inappropriate/excessive guilt, +tearfulness  Denies SI/HI  Anxiety rating (0 = none, 10 = worst): 10  Reports: no panic attacks, +generalized anxiety/worry, +ruminations, +restlessness, +keyed up/on edge  +mm tension/irritability  Sleep: intact and restful, approximately 10 hours.  Energy: diminished  Appetite: intact  Focus/Concentration: diminished   Kenia: not currently  Psychosis: NO  Trauma: NO  Eating Disorder: none       Review of Systems   All other systems reviewed and are negative.         HISTORY:     Past Medical History[1]    Past Surgical History[2]     Hx of Seizure: no  Hx of Significant Head Injury (e.g., Loss of Consciousness, Concussion, Coma): no    PAST PSYCHIATRIC HISTORY  Past Psychiatric Diagnoses:  bipolar I, HUMPHREY, adjustment disorder with depressed mood, Grief  Past Psychiatric Medications:    Trileptal  Citalopram  Diazepam  Lamotrigine  Lithium  Quetiapine, effective, unclear why med was d/c'd  Caplyta - lost movement in legs, dizziness, confusion  Vraylar - oversedation  Lyleah Begum   Inpatient Hospitalizations:  denies  Outpatient treatment:    CLAYTON Sheriff  h/o med mgmt with Dr. Yañez, last seen 2015.  Most recently seen by Dr. Mack  H/o IOP >10 years  Suicide attempts: denies  SIB: denies  Violence: denies  Physical abuse: denies  Sexual abuse: denies      SUBSTANCE USE HISTORY  Nicotine: NO*  Alcohol: 3 drinks/week.  Cannabis: h/o MJ gummies.  Only took 2x.  Prescribed for anxiety/depression (Dr. Mack)  Illicits: denies  Rehab/Detox: denies    FAMILY PSYCHIATRIC HISTORY  Daughter: polysubstance dependence (opiates, meth)  Father: anxiety  Paternal Uncle: committed suicide    SOCIAL HISTORY  Housing status: patient lives with *  Marital status:  48  years  Education: HS diploma*  Employment Status: retired.  Previously a  at [employer]  Children:  1 daughter, 1 son  :  none  Legal:  denies    Current Evaluation:     DIAGNOSTIC TESTING:     Wt Readings from Last 3 Encounters:   05/12/25 53.5 kg (118 lb 0.9 oz)   12/22/24 54.4 kg (120 lb)   09/05/24 59.3 kg (130 lb 11.7 oz)     BP Readings from Last 1 Encounters:   05/12/25 119/78     Pulse Readings from Last 1 Encounters:   05/12/25 86         Glu 107  12/22/2024  Li 0.1 (L)  12/22/2024  TSH 0.447  12/22/2024    HgA1c 5.4  6/20/2023  VPA *   *   FT4 1.08  12/22/2024    Na 138  12/22/2024  CLZ *   *  WBC 9.44  12/22/2024    Cr 1.0  12/22/2024  ANC 5.5; 57.8;   12/22/2024   Hgb 13.6  12/22/2024     BUN 11  12/22/2024  Trop I *   *  HCT 40.0  12/22/2024     GFR >60.0  12/22/2024   CPK *   *    12/22/2024     Alb 4.4  12/22/2024   PRL *   *  B12 *   *     T Bili 0.4  12/22/2024  Chol 164  12/22/2024  B9 *   *    ALP 97  12/22/2024  TGs 76  12/22/2024  B1 *   *    AST 25  12/22/2024  HDL 66  12/22/2024  Vit D 49  7/11/2024     ALT 31  12/22/2024  LDL 82.8  12/22/2024  HIV *   *     INR 1.0  12/22/2024  Donya *   *   Hep C *   *    GGT *   *  Lip *   *  RPR *   *    MCV 96  12/22/2024   NH4 *   *  UPT *   *      PETH *   *  THC *   *    ETOH *   *  EDGARDO *   *    EtG *   *  AMP *   *    ALC *   *  OPI *   *    BZO *   *  MTD *   *     BAR *   *  BUP *   *    PCP *   *  FEN *   *     Results for orders placed or performed during the hospital encounter of 12/22/24   ECG 12 lead    Collection Time: 12/22/24  1:38 PM   Result Value Ref Range    QRS Duration 78 ms    OHS QTC Calculation 442 ms    Narrative    Test Reason : R29.818,    Vent. Rate :  91 BPM     Atrial Rate :  91 BPM     P-R Int : 142 ms          QRS Dur :  78 ms      QT Int : 360 ms       P-R-T Axes :  86  73  83 degrees    QTcB Int : 442 ms    Normal sinus rhythm  Normal  "ECG  When compared with ECG of 13-May-2015 15:35,  No significant change was found  Confirmed by Jared Spear ([id number]) on 12/23/2024 9:53:37 AM    Referred By: AAAREFERRAL SELF           Confirmed By: Jared Spear       ALLERGIES:  Iodine and Sulfa (sulfonamide antibiotics)    MEDICATIONS  Encounter Medications[3]    If female, birth control: post menopause    Mental Status Exam (MSE)  Constitutional  Vitals:  Most recent vital signs were reviewed.    Vitals:    05/12/25 1129   BP: 119/78   Pulse: 86   Weight: 53.5 kg (118 lb 0.9 oz)     Body mass index is 20.91 kg/m².     General:   Well developed, appropriately dressed, appropriate hygiene     Musculoskeletal  Muscle Strength/Tone:   Grossly appropriate, intact   Gait:   Intact, appropriate     Psychiatric  Behavior:  Calm, cooperative   Eye Contact:  Intact   Speech:  Appropriate rate/volume/tone; spontaneous   Mood:   "Very depressed"   Affect:  Dysthymic, anxious, mood congruent, reactive   Thought Process:  Linear and goal directed, organized, logical   Associations:  intact   Thought Content:  normal, no suicidality, no homicidality, delusions, or paranoia   Insight:  limited awareness of illness   Judgement: behavior is adequate to circumstances, age appropriate   Orientation:  grossly intact   Memory: Limited memory of past medications.   Language: grossly intact   Attention Span & Concentration:  able to focus   Fund of Knowledge:  intact and appropriate to age and level of education, familiar with aspects of current personal life     Relevant Elements of Neurological Exam: No Abnormal Involuntary movements, tremor, EPS, or TD        Assessment - Diagnosis - Goals:         ICD-10-CM ICD-9-CM   1. Bipolar affective disorder, currently depressed, moderate  F31.32 296.52   2. HUMPHREY (generalized anxiety disorder)  F41.1 300.02       TREATMENT PLAN/RECOMMENDATIONS:     Bipolar disorder  Lybalvi 5-10 mg po qhs.  Plan to cross taper to seroquel due to limited " efficacy  Start Seroquel XR 50 mg po qhs x 1 week, then 100 mg po qhs, then 150 mg po qhs.  Take 3-4 hours prior to bedtime  Patient has tolerated med in past with good effect  Risks/benefits/side effects discussed, including, but not limited to, AIMS, metabolic syndrome, weight gain, and NMS.  Patient and  verbalized understanding.  Lamictal 200 mg po bid  Continue psychotherapy with CLAYTON Sheriff.    HUMPHREY  Seroquel as above  Diazepam 5 mg po tid prn anxiety  5/12/2025,  reviewed: no concerns  Continue psychotherapy with CLAYTON Sheriff.    LABWORK: reviewed. Up to date as of 5/12/2025      REFERRAL/CONSULTS:    N/a    RETURN TO CLINIC: 6/9/2025 @12p      RISK MANAGEMENT:      -- SUICIDAL IDEATION (current  as voiced during the assessment): DENIES      -- HOMICIDAL IDEATION (current  as voiced during the assessment): DENIES      -- NON-SUICIDAL SELF-INJURIOUS BEHAVIOR (current  to the episode/presentation): ABSENT      -- PERPETRATED VIOLENCE (current  to the episode/presentation): ABSENT     INFORMED CONSENT & SHARED DECISION MAKING are the hallmark and bedrock of good clinical care, and as such have been employed and obtained, respectively, to the degree possible.    NOTE: discussed, to the extent possible, diagnosis, risks and benefits of proposed treatment (e.g., medication, therapy) vs alternative treatments vs no treatment, potential side effects of these treatments, and the inherent unpredictability of treatment.     ADVICE & COUNSELING:   - In cases of emergencies (e.g. SI/HI resulting in danger to self or others, functioning deteriorating to the level of grave disability), call 911 or [phone], or present to the emergency department for immediate assistance.   - Individuals should not operate a motor vehicle or heavy machinery if effects of medications or underlying symptoms/condition impair the ability to do so safely.   - FULLY comply with ANY/ALL medication as prescribed/instructed and report ANY/ALL  suspected adverse effects to appropriate health care providers.                 Yessy Medina MD, [facility]  Consultation/Liaison Psychiatry         [1]   Past Medical History:  Diagnosis Date    Bipolar affective disorder, currently depressed, moderate     Dr Lourdes Mack    Routine general medical examination at a health care facility 07/18/2024    SVT (supraventricular tachycardia)     2015 ablated with Adenosine EMT (too much caffeine), cardiologist rec bblocker if it recurrs    Vitamin D deficiency     2015 OTC suppl    Word finding difficulty 07/10/2019    MRI nml 2019, refer to Neurol 7/2019   [2]   Past Surgical History:  Procedure Laterality Date    APPENDECTOMY  8/28/2020    Procedure: APPENDECTOMY;  Surgeon: David Eldridge MD;  Location: [facility] OR [address] [place] [address];  Service: General;;    LAPAROSCOPIC APPENDECTOMY N/A 8/28/2020    Procedure: APPENDECTOMY, LAPAROSCOPIC CONVERTED TO OPEN;  Surgeon: David Eldridge MD;  Location: [facility] OR [address] [place] [address];  Service: General;  Laterality: N/A;   [3]   Outpatient Encounter Medications as of 5/12/2025   Medication Sig Dispense Refill    cholecalciferol, vitamin D3, (VITAMIN D3) 50 mcg (2,000 unit) Tab Take by mouth once daily.      diazePAM (VALIUM) 5 MG tablet Take 5 mg by mouth 3 (three) times daily.      FLAREX 0.1 % DrpS Place 1 drop into both eyes 3 (three) times daily.      lamoTRIgine (LAMICTAL) 150 MG Tab Take 200 mg by mouth 2 (two) times daily.      latanoprost 0.005 % ophthalmic solution Place 1 drop into both eyes every evening. 7.5 mL 4    LYBALVI 5-10 mg Tab Take 1 tablet by mouth every evening.      multivit-min/ferrous fumarate (MULTI VITAMIN ORAL) Take by mouth.      pneumoc 20-nitesh conj-dip cr,PF, (PREVNAR 20, PF,) 0.5 mL Syrg injection Inject into the muscle. 0.5 mL 0    rosuvastatin (CRESTOR) 10 MG tablet Take 1 tablet (10 mg total) by mouth once daily. 90 tablet 2    QUEtiapine (SEROQUEL XR) 50 mg Tb24 1 tab po qpm x 1 week; then 2 tabs po qpm; then 3  tabs po qpm.  Take 3-4 hours before bedtime. 90 tablet 1    [DISCONTINUED] biotin 2,500 mcg Tab Take 50,000 mcg by mouth once daily.      [DISCONTINUED] CELEXA 10 mg tablet Take 10 mg by mouth every evening.      [DISCONTINUED] cyanocobalamin 500 MCG tablet Take 5,000 mcg by mouth once daily.      [DISCONTINUED] lithium (LITHOBID) 300 MG CR tablet Take 600 mg by mouth every evening.      [DISCONTINUED] methocarbamoL (ROBAXIN) 500 MG Tab Take 1 tablet (500 mg total) by mouth 3 (three) times daily as needed (right side lower back pain). 20 tablet 0    [DISCONTINUED] QUEtiapine (SEROQUEL XR) 300 MG Tb24 Take 300 mg by mouth every evening.      [DISCONTINUED] QUEtiapine (SEROQUEL XR) 400 MG Tb24 Take 400 mg by mouth nightly.      [DISCONTINUED] vit A/vit C/vit E/zinc/copper (PRESERVISION AREDS ORAL) Take by mouth.      [DISCONTINUED] zinc gluconate 50 mg tablet Take 50 mg by mouth once daily.       No facility-administered encounter medications on file as of 5/12/2025.

## 2025-06-04 ENCOUNTER — OFFICE VISIT (OUTPATIENT)
Dept: PSYCHIATRY | Facility: CLINIC | Age: 65
End: 2025-06-04
Payer: MEDICARE

## 2025-06-04 DIAGNOSIS — F41.1 GAD (GENERALIZED ANXIETY DISORDER): ICD-10-CM

## 2025-06-04 DIAGNOSIS — F31.32 BIPOLAR AFFECTIVE DISORDER, CURRENTLY DEPRESSED, MODERATE: Primary | ICD-10-CM

## 2025-06-04 DIAGNOSIS — Z63.9 FAMILY PROBLEMS: ICD-10-CM

## 2025-06-04 PROCEDURE — 1159F MED LIST DOCD IN RCRD: CPT | Mod: CPTII,S$GLB,, | Performed by: SOCIAL WORKER

## 2025-06-04 PROCEDURE — 90837 PSYTX W PT 60 MINUTES: CPT | Mod: S$GLB,,, | Performed by: SOCIAL WORKER

## 2025-06-04 PROCEDURE — 99999 PR PBB SHADOW E&M-EST. PATIENT-LVL I: CPT | Mod: PBBFAC,,, | Performed by: SOCIAL WORKER

## 2025-06-04 SDOH — SOCIAL DETERMINANTS OF HEALTH (SDOH): PROBLEM RELATED TO PRIMARY SUPPORT GROUP, UNSPECIFIED: Z63.9

## 2025-06-09 ENCOUNTER — OFFICE VISIT (OUTPATIENT)
Dept: PSYCHIATRY | Facility: CLINIC | Age: 65
End: 2025-06-09
Payer: MEDICARE

## 2025-06-09 VITALS
DIASTOLIC BLOOD PRESSURE: 64 MMHG | HEART RATE: 96 BPM | BODY MASS INDEX: 20.8 KG/M2 | SYSTOLIC BLOOD PRESSURE: 106 MMHG | WEIGHT: 117.38 LBS

## 2025-06-09 DIAGNOSIS — F31.31 BIPOLAR AFFECTIVE DISORDER, CURRENTLY DEPRESSED, MILD: Primary | Chronic | ICD-10-CM

## 2025-06-09 DIAGNOSIS — F41.1 GAD (GENERALIZED ANXIETY DISORDER): Chronic | ICD-10-CM

## 2025-06-09 PROCEDURE — 3078F DIAST BP <80 MM HG: CPT | Mod: CPTII,S$GLB,, | Performed by: PSYCHIATRY & NEUROLOGY

## 2025-06-09 PROCEDURE — 1160F RVW MEDS BY RX/DR IN RCRD: CPT | Mod: CPTII,S$GLB,, | Performed by: PSYCHIATRY & NEUROLOGY

## 2025-06-09 PROCEDURE — 1159F MED LIST DOCD IN RCRD: CPT | Mod: CPTII,S$GLB,, | Performed by: PSYCHIATRY & NEUROLOGY

## 2025-06-09 PROCEDURE — 99999 PR PBB SHADOW E&M-EST. PATIENT-LVL II: CPT | Mod: PBBFAC,,, | Performed by: PSYCHIATRY & NEUROLOGY

## 2025-06-09 PROCEDURE — 99215 OFFICE O/P EST HI 40 MIN: CPT | Mod: S$GLB,,, | Performed by: PSYCHIATRY & NEUROLOGY

## 2025-06-09 PROCEDURE — 90833 PSYTX W PT W E/M 30 MIN: CPT | Mod: S$GLB,,, | Performed by: PSYCHIATRY & NEUROLOGY

## 2025-06-09 PROCEDURE — 3074F SYST BP LT 130 MM HG: CPT | Mod: CPTII,S$GLB,, | Performed by: PSYCHIATRY & NEUROLOGY

## 2025-06-09 PROCEDURE — G2211 COMPLEX E/M VISIT ADD ON: HCPCS | Mod: S$GLB,,, | Performed by: PSYCHIATRY & NEUROLOGY

## 2025-06-09 PROCEDURE — 3008F BODY MASS INDEX DOCD: CPT | Mod: CPTII,S$GLB,, | Performed by: PSYCHIATRY & NEUROLOGY

## 2025-06-09 RX ORDER — DIAZEPAM 5 MG/1
5 TABLET ORAL 3 TIMES DAILY
Qty: 90 TABLET | Refills: 3 | Status: SHIPPED | OUTPATIENT
Start: 2025-06-09

## 2025-06-09 RX ORDER — QUETIAPINE 400 MG/1
400 TABLET, FILM COATED, EXTENDED RELEASE ORAL DAILY
Qty: 30 TABLET | Refills: 3 | Status: SHIPPED | OUTPATIENT
Start: 2025-06-09

## 2025-06-09 RX ORDER — LAMOTRIGINE 200 MG/1
200 TABLET ORAL DAILY
Qty: 60 TABLET | Refills: 3 | Status: SHIPPED | OUTPATIENT
Start: 2025-06-09 | End: 2026-06-09

## 2025-06-09 NOTE — PROGRESS NOTES
"Subjective:       Patient ID: Kimberly Jo is a 65 y.o. female.    PSYCHIATRY ENCOUNTER  6/9/2025      SERVICE: Geriatric  ENCOUNTER: subsequent    CHIEF COMPLAINT: Patient presents for follow-up of anxiety and depression, with medication adjustment since the last visit.    START TIME: 6/9/2025 11:56 AM  STOP TIME: 6/9/2025 12:40 PM  TOTAL ENCOUNTER TIME (in minutes): 64  TIME WITH PATIENT (in minutes): 44    -- PATIENT IDENTIFIERS: Kimberly Jo  [id number]  1960  65 y.o.  female  -- LOCATION OF PATIENT: clinic/office    -- MODE OF ARRIVAL: self-presented  -- PRESENT WITH PATIENT DURING SESSION: ALONE  -- SOURCES OF INFORMATION: PATIENT, EHR/chart  -- LOCATION OF ENCOUNTER PROVIDER: [place], [place]    -- ENCOUNTER PROVIDER: Yessy Medina MD       Reason for Encounter:   f/u for med mgmt*    History of Present Illness:     Patient reports ongoing anxiety, which she describes as being "high strung." She rates her current anxiety level at 5-7 out of 10, an improvement from the previous rating of 10. Patient mentions having anxiety all her life, noting a possible hereditary component as her father experienced similar issues. She experiences difficulty with routine activities, such as parking and attending pottery classes, due to anxiety. Patient also reports occasional shutdowns in public places like [employer].    Patient's depression has improved since starting Seroquel, with her rating decreasing from 10 out of 10 to 5-6 out of 10. She reports feeling better than before but still struggles with maintaining a routine and managing responsibilities.    Patient reports sleeping well, getting about 10 hours of sleep per night. Her energy levels fluctuate, with periods of high energy followed by shutdowns. She continues to struggle with concentration and focus, reporting difficulty listening to others and often missing information in conversations.    Patient expresses distress over the loss of her previous " routines, particularly yoga classes and pottery sessions, which were disrupted due to COVID-19. She finds it challenging to re-establish these activities due to anxiety and logistical issues.    Patient mentions caring for her sister-in-law, who has been given a poor prognosis of two months to live. She also expresses concern about her daughter's drug use and the recent adoption of her grandchildren by her niece.    Symptom Status: improving  Adverse Effects: denies  Compliance: yes       PSYCHIATRIC ROS: see HPI  Depression rating (0 = none, 10 = worst): 5-6  Reports: no hopelessness, no helplessness, no inappropriate/excessive guilt, no tearfulness, +depressed mood  Anxiety rating (0 = none, 10 = worst): 5-7  Reports: no panic attacks, +generalized anxiety/worry, +ruminations, +restlessness, +keyed up/on edge  +mm tension  Sleep: intact, 10 hours qhs  Energy: flucations  Appetite: intact  Focus/Concentration: diminished   Kenia: none currently, none since last visit.  Psychosis: NO  Trauma: NO  Eating Disorder: none     Review of Systems   Eyes:         Corrective lenses   All 12 systems otherwise negative.        Past Medical History[1]    Past Surgical History[2]     Past Medical, Surgical, Family and Social History: The patient's past medical, family and social history have been reviewed and updated as appropriate within the electronic medical record - see encounter notes.     >> SOURCES: patient, chart review      -- Pertinent Elements of the Past History:     Hx of Seizure: no  Hx of Significant Head Injury (e.g., Loss of Consciousness, Concussion, Coma): no     PAST PSYCHIATRIC HISTORY  Past Psychiatric Diagnoses:  bipolar I, HUMPHREY, adjustment disorder with depressed mood, Grief  Inpatient Hospitalizations:  denies  Outpatient treatment:    · [facility]  · h/o med mgmt with Dr. Yañez, last seen 2015.  · Most recently seen by Dr. Mack  · H/o IOP >10 years  Suicide attempts: denies  SIB: denies  Violence:  denies  Physical abuse: denies  Sexual abuse: denies        SUBSTANCE USE HISTORY  Nicotine: NO*  Alcohol: 3 drinks/week.  Cannabis: h/o MJ gummies.  Only took 2x.  Prescribed for anxiety/depression (Dr. Mack)  Illicits: denies  Rehab/Detox: denies     FAMILY PSYCHIATRIC HISTORY  Daughter: polysubstance dependence (opiates, meth)  Father: anxiety  Paternal Uncle: committed suicide     SOCIAL HISTORY  Housing status: patient lives with *  Marital status:  48 years  Education: HS diploma*  Employment Status: retired.  Previously a  at [employer]  Children:  1 daughter, 1 son  :  none  Legal:  denies       -- Psychotropic Trials  detailed/expanded:     - Trileptal  - Citalopram  - Diazepam  - Lamotrigine  - Lithium  - Quetiapine, effective, unclear why med was d/c'd  - Caplyta - lost movement in legs, dizziness, confusion  - Vraylar - oversedation  - Lybalvi  - Latuda       >> SCHEDULED AND PRN MEDS: reviewed/reconciled  see MEDCARD      Objective:       DIAGNOSTIC TESTING:     Wt Readings from Last 3 Encounters:   06/05/25 54.1 kg (119 lb 3.2 oz)   05/12/25 53.5 kg (118 lb 0.9 oz)   12/22/24 54.4 kg (120 lb)     BP Readings from Last 1 Encounters:   06/05/25 (!) 89/59     Pulse Readings from Last 1 Encounters:   06/05/25 103         Glu 107  12/22/2024  Li 0.1 (L)  12/22/2024  TSH 0.447  12/22/2024    HgA1c 5.4  6/20/2023  VPA *   *   FT4 1.08  12/22/2024    Na 138  12/22/2024  CLZ *   *  WBC 9.44  12/22/2024    Cr 1.0  12/22/2024  ANC 5.5; 57.8;   12/22/2024   Hgb 13.6  12/22/2024     BUN 11  12/22/2024  Trop I *   *  HCT 40.0  12/22/2024     GFR >60.0  12/22/2024   CPK *   *    12/22/2024     Alb 4.4  12/22/2024   PRL *   *  B12 *   *     T Bili 0.4  12/22/2024  Chol 164  12/22/2024  B9 *   *    ALP 97  12/22/2024  TGs 76  12/22/2024  B1 *   *    AST 25  12/22/2024  HDL 66  12/22/2024  Vit D 49  7/11/2024     ALT 31  12/22/2024  LDL 82.8   "12/22/2024  HIV *   *     INR 1.0  12/22/2024  Donya *   *   Hep C *   *    GGT *   *  Lip *   *  RPR *   *    MCV 96  12/22/2024   NH4 *   *  UPT *   *      PETH *   *  THC *   *    ETOH *   *  EDGARDO *   *    EtG *   *  AMP *   *    ALC *   *  OPI *   *    BZO *   *  MTD *   *     BAR *   *  BUP *   *    PCP *   *  FEN *   *     Results for orders placed or performed during the hospital encounter of 12/22/24   ECG 12 lead    Collection Time: 12/22/24  1:38 PM   Result Value Ref Range    QRS Duration 78 ms    OHS QTC Calculation 442 ms    Narrative    Test Reason : R29.818,    Vent. Rate :  91 BPM     Atrial Rate :  91 BPM     P-R Int : 142 ms          QRS Dur :  78 ms      QT Int : 360 ms       P-R-T Axes :  86  73  83 degrees    QTcB Int : 442 ms    Normal sinus rhythm  Normal ECG  When compared with ECG of 13-May-2015 15:35,  No significant change was found  Confirmed by Jared Spear ([id number]) on 12/23/2024 9:53:37 AM    Referred By: AAAREFERRAL SELF           Confirmed By: Jared Spear       ALLERGIES:  Iodine and Sulfa (sulfonamide antibiotics)    MEDICATIONS  Encounter Medications[3]    If female, birth control: no      Mental Status Exam (MSE)  Constitutional  Vitals:  Most recent vital signs were reviewed.    There were no vitals filed for this visit.  There is no height or weight on file to calculate BMI.     General:   Well developed, appropriately dressed, appropriate hygiene     Musculoskeletal  Muscle Strength/Tone:   Grossly appropriate, intact   Gait:   Intact, appropriate     Psychiatric  Behavior:  Calm, cooperative   Eye Contact:  Intact   Speech:  Appropriate rate/volume/tone; spontaneous   Mood:   "I guess I'm good"   Affect:  Appropriate to situation/content, mood congruent, reactive   Thought Process:  Linear and goal directed, organized, logical   Associations:  intact   Thought Content:  normal, no suicidality, no homicidality, delusions, or paranoia   Insight:  intact, has " awareness of illness   Judgement: behavior is adequate to circumstances, age appropriate   Orientation:  grossly intact   Memory: intact for content of interview, grossly intact   Language: grossly intact   Attention Span & Concentration:  able to focus, completed tasks   Fund of Knowledge:  intact and appropriate to age and level of education, familiar with aspects of current personal life     Relevant Elements of Neurological Exam: No Abnormal Involuntary movements, tremor, EPS, or TD    06/09/2025, AIMS: 0      Assessment and Plan:   No diagnosis found.    IMPRESSION:  Anxiety and depression symptoms improved from previous visit but still significant (5-7/10 for anxiety, 5-6/10 for depression).  Multiple stressors contributing to anxiety, including family illness and concerns about daughter.  Optimized Seroquel dosage to address treatment-resistant anxiety, explained it is used off-label for this purpose.    Bipolar disorder  D/c Lybalvi 5-10 mg po qhs.  Plan to cross taper to seroquel due to limited efficacy  Increase Seroquel XR to 300 mg qhs x 1 week, then increase to 400 mg qhs.  Take 3-4 hours prior to bedtime  Patient has tolerated med in past with good effect  Risks/benefits/side effects discussed, including, but not limited to, AIMS, metabolic syndrome, weight gain, and NMS.  Patient and  verbalized understanding.  Lamictal 200 mg po bid  Continue psychotherapy with [facility] Sheriff.     HUMPHREY  Seroquel as above  Diazepam 5 mg po tid prn anxiety  06/09/2025,  reviewed: no concerns  Continue psychotherapy with CLAY[facility] Sheriff.     LABWORK: reviewed. Up to date as of 06/09/2025       RETURN TO CLINIC: 8/11/2025, @12p      RISK MANAGEMENT:      -- SUICIDAL IDEATION (current  as voiced during the assessment): DENIES      -- HOMICIDAL IDEATION (current  as voiced during the assessment): DENIES      -- NON-SUICIDAL SELF-INJURIOUS BEHAVIOR (current  to the episode/presentation): ABSENT      -- PERPETRATED VIOLENCE  (current  to the episode/presentation): ABSENT     -- ACCESS TO FIREARMS: NO  -- FIREARM SAFETY: COUNSELED     - Counseling has been provided on gun safety - including proper storage and inherent risk.    INFORMED CONSENT & SHARED DECISION MAKING are the hallmark and bedrock of good clinical care, and as such have been employed and obtained, respectively, to the degree possible.    NOTE: discussed, to the extent possible, diagnosis, risks and benefits of proposed treatment (e.g., medication, therapy) vs alternative treatments vs no treatment, potential side effects of these treatments, and the inherent unpredictability of treatment.     ADVICE & COUNSELING:   - In cases of emergencies (e.g. SI/HI resulting in danger to self or others, functioning deteriorating to the level of grave disability), call 911 or [phone], or present to the emergency department for immediate assistance.   - Individuals should not operate a motor vehicle or heavy machinery if effects of medications or underlying symptoms/condition impair the ability to do so safely.   - FULLY comply with ANY/ALL medication as prescribed/instructed and report ANY/ALL suspected adverse effects to appropriate health care providers.              ADD-ON PSYCHOTHERAPY:     [x] +13008 Add-On Psychotherapy: 30 minutes (range 16-37 minutes)  [] +10503 Add-On Psychotherapy: 45 minutes (range 38-52 minutes)  [] +19223 Add-On Psychotherapy: 60 minutes (range 53 minutes or greater)    Duration: 19 minutes    Primary Focus: anxiety     Modalities: supportive **  Techniques: active listening, clarification, empathic responses, psychoeducation **  Selection Criteria: effectiveness **  Outcome Monitoring: family feedback, observation, self-report **  Participation: adequate **  [place]: motivated **  Progression: steadily **  Response: good **      Yessy Medina MD, [facility]  Consultation Liaison Psychiatry      This note was generated with the assistance of ambient listening  technology. Verbal consent was obtained by the patient and accompanying visitor(s) for the recording of patient appointment to facilitate this note. I attest to having reviewed and edited the generated note for accuracy, though some syntax or spelling errors may persist. Please contact the author of this note for any clarification.             [1]   Past Medical History:  Diagnosis Date    Bipolar affective disorder, currently depressed, moderate     Dr Lourdes Mack    Routine general medical examination at a [facility] care facility 07/18/2024    SVT (supraventricular tachycardia)     2015 ablated with Adenosine EMT (too much caffeine), cardiologist rec bblocker if it recurrs    Vitamin D deficiency     2015 OTC suppl    Word finding difficulty 07/10/2019    MRI nml 2019, refer to Neurol 7/2019   [2]   Past Surgical History:  Procedure Laterality Date    APPENDECTOMY  8/28/2020    Procedure: APPENDECTOMY;  Surgeon: David Eldridge MD;  Location: [facility] OR [address] [place] [address];  Service: General;;    LAPAROSCOPIC APPENDECTOMY N/A 8/28/2020    Procedure: APPENDECTOMY, LAPAROSCOPIC CONVERTED TO OPEN;  Surgeon: David Eldridge MD;  Location: [facility] OR [address] [place] [address];  Service: General;  Laterality: N/A;   [3]   Outpatient Encounter Medications as of 6/9/2025   Medication Sig Dispense Refill    amoxicillin-clavulanate 500-125mg (AUGMENTIN) 500-125 mg Tab Take 1 tablet (500 mg total) by mouth 2 (two) times daily. 10 tablet 0    benzonatate (TESSALON) 200 MG capsule Take 1 capsule (200 mg total) by mouth 3 (three) times daily as needed. 20 capsule 0    cholecalciferol, vitamin D3, (VITAMIN D3) 50 mcg (2,000 unit) Tab Take by mouth once daily. (Patient not taking: Reported on 6/5/2025)      diazePAM (VALIUM) 5 MG tablet Take 5 mg by mouth 3 (three) times daily.      FLAREX 0.1 % DrpS Place 1 drop into both eyes 3 (three) times daily. (Patient not taking: Reported on 6/5/2025)      lamoTRIgine (LAMICTAL) 150 MG Tab Take 200 mg by mouth 2  (two) times daily.      latanoprost 0.005 % ophthalmic solution Place 1 drop into both eyes every evening. 7.5 mL 4    LYBALVI 5-10 mg Tab Take 1 tablet by mouth every evening.      multivit-min/ferrous fumarate (MULTI VITAMIN ORAL) Take by mouth.      pneumoc 20-nitesh conj-dip cr,PF, (PREVNAR 20, PF,) 0.5 mL Syrg injection Inject into the muscle. (Patient not taking: Reported on 6/5/2025) 0.5 mL 0    QUEtiapine (SEROQUEL XR) 50 mg Tb24 1 tab po qpm x 1 week; then 2 tabs po qpm; then 3 tabs po qpm.  Take 3-4 hours before bedtime. 90 tablet 1    rosuvastatin (CRESTOR) 10 MG tablet Take 1 tablet (10 mg total) by mouth once daily. 90 tablet 2     No facility-administered encounter medications on file as of 6/9/2025.

## 2025-06-09 NOTE — Clinical Note
Please book patient  for 8/11/2025   Time 12pm  Length:        EP30   in person  Pt aware.  Thank you, MM

## 2025-06-10 DIAGNOSIS — Z00.00 ROUTINE GENERAL MEDICAL EXAMINATION AT A HEALTH CARE FACILITY: Primary | ICD-10-CM

## 2025-06-10 DIAGNOSIS — Z79.899 ENCOUNTER FOR LONG-TERM (CURRENT) USE OF MEDICATIONS: ICD-10-CM

## 2025-06-10 NOTE — TELEPHONE ENCOUNTER
No care due was identified.  [facility] Embedded Care Due Messages. Reference number: [id number].   6/10/2025 6:53:18 AM CDT

## 2025-06-10 NOTE — PROGRESS NOTES
06/09/25 2009   Facial and Oral Movements   Muscles of Facial Expression 0   Lips and Perioral Area 0   Jaw 0   Tongue 0   Extremity Movements   Upper (arms, wrists, hands, fingers) 0   Lower (legs, knees, ankles, toes) 0   Trunk Movements   Neck, shoulders, hips 0   Overall Severity   Severity of abnormal movements (highest score from questions above) 0   Incapacitation due to abnormal movements 0   Patient's awareness of abnormal movements (rate only patient's report) 0   Dental Status   Current problems with teeth and/or dentures? No   Does patient usually wear dentures? No

## 2025-06-11 RX ORDER — ROSUVASTATIN CALCIUM 10 MG/1
10 TABLET, COATED ORAL
Qty: 90 TABLET | Refills: 0 | Status: SHIPPED | OUTPATIENT
Start: 2025-06-11

## 2025-07-10 ENCOUNTER — OFFICE VISIT (OUTPATIENT)
Dept: PSYCHIATRY | Facility: CLINIC | Age: 65
End: 2025-07-10
Payer: MEDICARE

## 2025-07-10 DIAGNOSIS — F41.1 GAD (GENERALIZED ANXIETY DISORDER): ICD-10-CM

## 2025-07-10 DIAGNOSIS — F31.32 BIPOLAR AFFECTIVE DISORDER, CURRENTLY DEPRESSED, MODERATE: Primary | ICD-10-CM

## 2025-07-10 DIAGNOSIS — Z63.9 FAMILY PROBLEMS: ICD-10-CM

## 2025-07-10 PROCEDURE — 99999 PR PBB SHADOW E&M-EST. PATIENT-LVL I: CPT | Mod: PBBFAC,,, | Performed by: SOCIAL WORKER

## 2025-07-10 PROCEDURE — 90837 PSYTX W PT 60 MINUTES: CPT | Mod: S$GLB,,, | Performed by: SOCIAL WORKER

## 2025-07-10 PROCEDURE — 1159F MED LIST DOCD IN RCRD: CPT | Mod: CPTII,S$GLB,, | Performed by: SOCIAL WORKER

## 2025-07-10 SDOH — SOCIAL DETERMINANTS OF HEALTH (SDOH): PROBLEM RELATED TO PRIMARY SUPPORT GROUP, UNSPECIFIED: Z63.9

## 2025-07-10 NOTE — PROGRESS NOTES
"  Individual Psychotherapy (PhD/LCSW)    7/10/2025    Site:  [facility]         Therapeutic Intervention: Met with patient.  Outpatient - Insight oriented psychotherapy 60 min - CPT code 55746, Outpatient - Behavior modifying psychotherapy 60 min - CPT code 91970, Outpatient - Supportive psychotherapy 60 min - CPT Code 49532 and Outpatient - Interactive psychotherapy 60 min - CPT code 09149    Chief complaint/reason for encounter: depression, mood swings, anxiety, behavior and interpersonal.     Interval history and content of current session:      Subjective:  Patient was last seen Three weeks ago and continues to report improvement in mood and functioning compared to prior sessions. Pt reports she is having more worried thoughts- feeling overwherlmed and anxious. Pt also reports intermittent thoughts of regrets about the past that she is ruminating over- Pt reports sometimes she can challege these thoughts and navigate them and not let them be an obstacle for her however other times she will want to retreat and lie on the couch and watch reruns and ruminate for several hours to a whole day or night.  Since resuming Lamotrigine (Lamictal), she continues to report a more stabilizing effect on mood. t.     Patient shared that she had a pleasant time recently with her grandchildren and continues to prioritize her involvement with family. She also remains a primary support for her sister-in-law, who is undergoing treatment for stage IV cancer through a clinical trial. While she finds purpose in these roles, she acknowledges feeling overwhelmed, particularly in the mornings, when she tends to ruminate on past mistakes and feels guilt about being "unreliable" or not managing responsibilities effectively--particularly in comparison to her , Jimbo, who she says "handles most things."  Patient has not returned to regular Restorationist attendance- notes has made improvement by going on Sundays, Not consisted with " walking, pottery, or gardening, and admits to spending more time on the couch watching movies. She describes ongoing worry about the future, particularly regarding her 's health and her capacity to care for others. She reports maintaining good relationships with close friends and overall, family dynamics are going better.    Next Session Focus:  Revisit self-care goals, assess emotional impact of caregiving and explore strategies to manage anticipatory grief and long-term support needs.    Objective:  Patient was well-groomed and oriented to person, place, and time. Affect was calm, congruent with mood. Thought processes were logical and goal-directed. No evidence of psychosis, darin, or acute anxiety. Speech was normal in rate and tone. Insight and judgment were adequate. No suicidal ideation (SI), homicidal ideation (HI), or auditory/visual hallucinations (A/V/H) reported or observed.    Assessment:  Patient presents with Bipolar Disorder, currently with more prominent depressive features. Symptoms of Major Depressive Disorder persist, including guilt, low energy, loss of interest in previously enjoyable activities, and morning rumination. While anxiety has improved, obsessive thought patterns persist. Protective factors include family engagement, ongoing psychiatric care, insight, and social support. Patient demonstrates moderate psychological insight and shows willingness to continue treatment.    Intervention:  Supportive psychotherapy provided, with emphasis on emotional validation and exploration of self-critical thoughts.  Cognitive reframing strategies introduced to challenge guilt-based cognitions and perfectionism.  Explored grief and anticipatory anxiety around caregiving responsibilities and fear of the future.  Reinforced importance of gentle self-care, behavioral activation, and re-engaging with small, manageable activities.  Normalized fluctuating energy levels and emphasized value of  pacing, boundaries, and self-compassion.  Encouraged continued medication adherence and psychiatric follow-up.    Treatment plan:    Target symptoms: distractibility, lack of focus, anxiety, recurrent depression, mood disorder, confusion, adjustment, family stress such as: caring for grandchildren,  is blind, sister-in-law dx'd with stage IV cancer-is her care taker and transports to all appts, legal issues with adoption and dealing with her daughter who is an addict.     Treatment Plan Goals:  Alleviate depressive symptoms and reduce negative rumination.  Increase engagement in pleasurable and meaningful activities.  Strengthen coping skills for health-related anxiety and caregiving stress.  Rebuild routine and structure around self-care and physical activity.  Support continued medication compliance and collaboration with psychiatrist.      Why chosen therapy is appropriate versus another modality: relevant to diagnosis, patient responds to this modality, evidence based practice  Outcome monitoring methods: self-report, observation, feedback from family  Therapeutic intervention type: insight oriented psychotherapy, behavior modifying psychotherapy, supportive psychotherapy, interactive psychotherapy    Risk parameters:  Patient reports no suicidal ideation  Patient reports no homicidal ideation  Patient reports no self-injurious behavior  Patient reports no violent behavior    Verbal deficits: None    Patient's response to intervention:  The patient's response to intervention is accepting.    Progress toward goals and other mental status changes:  The patient's progress toward goals is progressing. .    Diagnosis:     ICD-10-CM  PL            1. Bipolar disorder, current episode mixed, mild F31.61 296.61    2. HUMPHREY (generalized anxiety disorder) F41.1 300.02    3. Acute stress reaction F43.0 308.9    4. Death of family member Z63.4 V62.82    5. Sudden death R99 798.1       Plan:  Continue individual therapy  every 3 weeks focused on mood regulation, self-compassion, and role-related stress.  Assign reflective journaling prompt on managing guilt and expectations.  Encourage re-engagement with 1 previously enjoyable activity (daily Gnosticist attendance, walking, pottery, or gardening).  Monitor mood fluctuations and reinforce coping strategies during caregiving demands.  Reassess functional goals and emotional well-being in next session.    individual psychotherapy and medication management by physician    Return to clinic: 3-weeks    Length of Service (minutes): 60

## 2025-07-21 ENCOUNTER — TELEPHONE (OUTPATIENT)
Dept: PSYCHIATRY | Facility: CLINIC | Age: 65
End: 2025-07-21
Payer: MEDICARE

## 2025-07-21 ENCOUNTER — HOSPITAL ENCOUNTER (OUTPATIENT)
Dept: RADIOLOGY | Facility: HOSPITAL | Age: 65
Discharge: HOME | End: 2025-07-21
Attending: FAMILY MEDICINE
Payer: MEDICARE

## 2025-07-21 ENCOUNTER — PATIENT MESSAGE (OUTPATIENT)
Dept: PSYCHIATRY | Facility: CLINIC | Age: 65
End: 2025-07-21
Payer: MEDICARE

## 2025-07-21 DIAGNOSIS — Z12.31 OTHER SCREENING MAMMOGRAM: ICD-10-CM

## 2025-07-21 PROCEDURE — 77063 BREAST TOMOSYNTHESIS BI: CPT | Mod: TC

## 2025-07-24 ENCOUNTER — OFFICE VISIT (OUTPATIENT)
Dept: PRIMARY CARE CLINIC | Facility: CLINIC | Age: 65
End: 2025-07-24
Payer: MEDICARE

## 2025-07-24 VITALS
BODY MASS INDEX: 21.53 KG/M2 | HEIGHT: 63 IN | HEART RATE: 96 BPM | DIASTOLIC BLOOD PRESSURE: 70 MMHG | OXYGEN SATURATION: 98 % | WEIGHT: 121.5 LBS | SYSTOLIC BLOOD PRESSURE: 124 MMHG

## 2025-07-24 DIAGNOSIS — Z00.00 ROUTINE GENERAL MEDICAL EXAMINATION AT A HEALTH CARE FACILITY: Primary | ICD-10-CM

## 2025-07-24 DIAGNOSIS — E78.2 MIXED HYPERLIPIDEMIA: ICD-10-CM

## 2025-07-24 DIAGNOSIS — F31.32 BIPOLAR AFFECTIVE DISORDER, CURRENTLY DEPRESSED, MODERATE: ICD-10-CM

## 2025-07-24 PROCEDURE — 1159F MED LIST DOCD IN RCRD: CPT | Mod: CPTII,S$GLB,, | Performed by: FAMILY MEDICINE

## 2025-07-24 PROCEDURE — 3288F FALL RISK ASSESSMENT DOCD: CPT | Mod: CPTII,S$GLB,, | Performed by: FAMILY MEDICINE

## 2025-07-24 PROCEDURE — 99397 PER PM REEVAL EST PAT 65+ YR: CPT | Mod: S$GLB,,, | Performed by: FAMILY MEDICINE

## 2025-07-24 PROCEDURE — 3074F SYST BP LT 130 MM HG: CPT | Mod: CPTII,S$GLB,, | Performed by: FAMILY MEDICINE

## 2025-07-24 PROCEDURE — 1101F PT FALLS ASSESS-DOCD LE1/YR: CPT | Mod: CPTII,S$GLB,, | Performed by: FAMILY MEDICINE

## 2025-07-24 PROCEDURE — 1126F AMNT PAIN NOTED NONE PRSNT: CPT | Mod: CPTII,S$GLB,, | Performed by: FAMILY MEDICINE

## 2025-07-24 PROCEDURE — 99999 PR PBB SHADOW E&M-EST. PATIENT-LVL III: CPT | Mod: PBBFAC,,, | Performed by: FAMILY MEDICINE

## 2025-07-24 PROCEDURE — 3008F BODY MASS INDEX DOCD: CPT | Mod: CPTII,S$GLB,, | Performed by: FAMILY MEDICINE

## 2025-07-24 PROCEDURE — 1160F RVW MEDS BY RX/DR IN RCRD: CPT | Mod: CPTII,S$GLB,, | Performed by: FAMILY MEDICINE

## 2025-07-24 PROCEDURE — 3078F DIAST BP <80 MM HG: CPT | Mod: CPTII,S$GLB,, | Performed by: FAMILY MEDICINE

## 2025-07-24 RX ORDER — ROSUVASTATIN CALCIUM 10 MG/1
10 TABLET, COATED ORAL DAILY
Qty: 90 TABLET | Refills: 2 | Status: SHIPPED | OUTPATIENT
Start: 2025-07-24

## 2025-07-24 NOTE — PROGRESS NOTES
Assessment:     1. Routine general medical examination at a health care facility    2. Bipolar affective disorder, currently depressed, moderate    3. Mixed hyperlipidemia      Plan:     Routine general medical examination at a health care facility  Follow with GYN for female health & cancer prevention  Move more, High fiber, good fat (avocado, olive oil, nuts) foods  Eat more food grown from the earth (picked from trees or out of the ground)  Colon cancer screening at 42 yo  Follow up yearly with LABS ONE WEEK PRIOR so we can discuss at your visit      Bipolar affective disorder, currently depressed, moderate  Overwhelming stress w mom passed 2020,  sister passed 2021, nephew passed,  dtr Lala w substance & lost 2 dtrs, so  bijan Nath  adopted her granddtrs 5Kalylie &   3Kory (had to detox at 2 weeks old due to mom on substance), along w her own 2 dtrs     Her  is a great support  Stable, continue meds, follow w  PSychiatrist Dr Medina & therapist Jaziel    Mixed hyperlipidemia  Stable,   Continue Crestor 10    She requests referral GYN for exam, no concerns    HPI: Kimberly Jo is a 65 y.o. female with is here today for   annual    History of Present Illness    CHIEF COMPLAINT:  Patient presents today for follow up.    MENTAL HEALTH:  She has a history of significant depression treated by Psychiatrist Dr. Medina and therapist Jaziel    DIET AND EXERCISE:  She reports consuming approximately three servings of Coke soda daily. Her physical activity is rare      IMAGING:  Mammogram was performed on Monday, results pending.      ROS:  ROS as indicated in HPI.             Current Outpatient Medications   Medication Instructions    amoxicillin-clavulanate 500-125mg (AUGMENTIN) 500-125 mg Tab 500 mg, Oral, 2 times daily    cholecalciferol, vitamin D3, (VITAMIN D3) 50 mcg (2,000 unit) Tab Daily    FLAREX 0.1 % DrpS 1 drop, 3 times daily    lamoTRIgine (LAMICTAL) 200 mg, Oral, Daily    latanoprost  "0.005 % ophthalmic solution 1 drop, Both Eyes, Nightly    multivit-min/ferrous fumarate (MULTI VITAMIN ORAL) Take by mouth.    QUEtiapine (SEROQUEL XR) 400 mg, Oral, Daily, Take 3-4 hours prior to bedtime.    rosuvastatin (CRESTOR) 10 mg, Oral, Daily       Lab Results   Component Value Date    HGBA1C 5.4 06/20/2023    HGBA1C 5.5 02/21/2022    HGBA1C 5.4 06/15/2017     No results found for: "MICALBCREAT"  Lab Results   Component Value Date    LDLCALC 97.2 07/21/2025    LDLCALC 82.8 12/22/2024    CHOL 192 07/21/2025    HDL 76 (H) 07/21/2025    TRIG 94 07/21/2025       Lab Results   Component Value Date     07/21/2025    K 4.1 07/21/2025     07/21/2025    CO2 25 07/21/2025    GLU 96 07/21/2025    BUN 12 07/21/2025    CREATININE 0.9 07/21/2025    CALCIUM 9.3 07/21/2025    PROT 7.6 07/21/2025    ALBUMIN 4.5 07/21/2025    BILITOT 0.5 07/21/2025    ALKPHOS 90 07/21/2025    AST 32 07/21/2025    ALT 40 07/21/2025    ANIONGAP 8 07/21/2025    ESTGFRAFRICA >60.0 02/21/2022    EGFRNONAA 59.7 (A) 02/21/2022    WBC 6.34 07/21/2025    HGB 14.2 07/21/2025    HGB 13.6 12/22/2024    HCT 43.2 07/21/2025    MCV 99 (H) 07/21/2025     07/21/2025    TSH 0.447 12/22/2024    HEPCAB Negative 06/09/2016       Lab Results   Component Value Date    [id number] 49 07/11/2024    NKVUMYXS41KW 47 09/24/2018    KBHVIDSG88 721 12/04/2015         Past Medical History:   Diagnosis Date    Bipolar affective disorder, currently depressed, moderate     Dr Lourdes Mack    Routine general medical examination at a health care facility 07/18/2024    SVT (supraventricular tachycardia)     2015 ablated with Adenosine EMT (too much caffeine), cardiologist rec bblocker if it recurrs    Vitamin D deficiency     2015 OTC suppl    Word finding difficulty 07/10/2019    MRI nml 2019, refer to Neurol 7/2019     Past Surgical History:   Procedure Laterality Date    APPENDECTOMY  8/28/2020    Procedure: APPENDECTOMY;  Surgeon: David Eldridge MD;  " "[facility];  Service: General;;    LAPAROSCOPIC APPENDECTOMY N/A 8/28/2020    Procedure: APPENDECTOMY, LAPAROSCOPIC CONVERTED TO OPEN;  Surgeon: David Eldridge MD;  [facility];  Service: General;  Laterality: N/A;     Vitals:    07/24/25 1312   BP: 124/70   Pulse: 96   SpO2: 98%   Weight: 55.1 kg (121 lb 7.6 oz)   Height: 5' 3" (1.6 m)   PainSc: 0-No pain     Wt Readings from Last 5 Encounters:   07/24/25 55.1 kg (121 lb 7.6 oz)   06/09/25 53.3 kg (117 lb 6.3 oz)   06/05/25 54.1 kg (119 lb 3.2 oz)   05/12/25 53.5 kg (118 lb 0.9 oz)   12/22/24 54.4 kg (120 lb)     Objective:   Physical Exam  Constitutional:       Appearance: She is well-developed.   Eyes:      Pupils: Pupils are equal, round, and reactive to light.   Cardiovascular:      Rate and Rhythm: Normal rate and regular rhythm.      Heart sounds: Normal heart sounds. No murmur heard.  Pulmonary:      Effort: Pulmonary effort is normal.      Breath sounds: Normal breath sounds. No wheezing.   Abdominal:      General: Bowel sounds are normal. There is no distension.      Palpations: Abdomen is soft. There is no mass.      Tenderness: There is no abdominal tenderness. There is no guarding or rebound.   Musculoskeletal:      Cervical back: Neck supple.   Skin:     General: Skin is warm and dry.   Neurological:      Mental Status: She is alert.   Psychiatric:         Behavior: Behavior normal.                                   "

## 2025-07-24 NOTE — ASSESSMENT & PLAN NOTE
Overwhelming stress w mom passed 2020,  sister passed 2021, nephew passed,  dtr Lala w substance & lost 2 dtrs, so  bijan Nath  adopted her granddtrs 5Kalylie &   3Kory (had to detox at 2 weeks old due to mom on substance), along w her own 2 dtrs     Her  is a great support  Stable, continue meds, follow w  PSychiatrist Dr Medina & therapist Jaziel

## 2025-08-11 ENCOUNTER — OFFICE VISIT (OUTPATIENT)
Dept: PSYCHIATRY | Facility: CLINIC | Age: 65
End: 2025-08-11
Payer: MEDICARE

## 2025-08-11 VITALS
WEIGHT: 116.19 LBS | SYSTOLIC BLOOD PRESSURE: 104 MMHG | HEART RATE: 103 BPM | DIASTOLIC BLOOD PRESSURE: 60 MMHG | BODY MASS INDEX: 20.58 KG/M2

## 2025-08-11 DIAGNOSIS — F41.1 GAD (GENERALIZED ANXIETY DISORDER): Primary | Chronic | ICD-10-CM

## 2025-08-11 DIAGNOSIS — F31.32 BIPOLAR AFFECTIVE DISORDER, CURRENTLY DEPRESSED, MODERATE: Chronic | ICD-10-CM

## 2025-08-11 PROBLEM — F31.31 BIPOLAR AFFECTIVE DISORDER, CURRENTLY DEPRESSED, MILD: Chronic | Status: ACTIVE | Noted: 2025-08-11

## 2025-08-11 PROCEDURE — 3078F DIAST BP <80 MM HG: CPT | Mod: CPTII,S$GLB,, | Performed by: PSYCHIATRY & NEUROLOGY

## 2025-08-11 PROCEDURE — 3008F BODY MASS INDEX DOCD: CPT | Mod: CPTII,S$GLB,, | Performed by: PSYCHIATRY & NEUROLOGY

## 2025-08-11 PROCEDURE — G2211 COMPLEX E/M VISIT ADD ON: HCPCS | Mod: S$GLB,,, | Performed by: PSYCHIATRY & NEUROLOGY

## 2025-08-11 PROCEDURE — 99215 OFFICE O/P EST HI 40 MIN: CPT | Mod: S$GLB,,, | Performed by: PSYCHIATRY & NEUROLOGY

## 2025-08-11 PROCEDURE — 99999 PR PBB SHADOW E&M-EST. PATIENT-LVL II: CPT | Mod: PBBFAC,,, | Performed by: PSYCHIATRY & NEUROLOGY

## 2025-08-11 PROCEDURE — 1160F RVW MEDS BY RX/DR IN RCRD: CPT | Mod: CPTII,S$GLB,, | Performed by: PSYCHIATRY & NEUROLOGY

## 2025-08-11 PROCEDURE — 90833 PSYTX W PT W E/M 30 MIN: CPT | Mod: S$GLB,,, | Performed by: PSYCHIATRY & NEUROLOGY

## 2025-08-11 PROCEDURE — 1159F MED LIST DOCD IN RCRD: CPT | Mod: CPTII,S$GLB,, | Performed by: PSYCHIATRY & NEUROLOGY

## 2025-08-11 PROCEDURE — 3074F SYST BP LT 130 MM HG: CPT | Mod: CPTII,S$GLB,, | Performed by: PSYCHIATRY & NEUROLOGY

## 2025-08-11 RX ORDER — CITALOPRAM 10 MG/1
10 TABLET ORAL DAILY
Qty: 30 TABLET | Refills: 3 | Status: SHIPPED | OUTPATIENT
Start: 2025-08-11 | End: 2025-12-09

## 2025-08-11 RX ORDER — QUETIAPINE 400 MG/1
400 TABLET, FILM COATED, EXTENDED RELEASE ORAL DAILY
Qty: 90 TABLET | Refills: 1 | Status: SHIPPED | OUTPATIENT
Start: 2025-08-11

## 2025-08-11 RX ORDER — DIAZEPAM 5 MG/1
5 TABLET ORAL 2 TIMES DAILY PRN
Qty: 60 TABLET | Refills: 3 | Status: SHIPPED | OUTPATIENT
Start: 2025-08-11

## 2025-08-11 RX ORDER — LAMOTRIGINE 200 MG/1
200 TABLET ORAL 2 TIMES DAILY
Qty: 180 TABLET | Refills: 1 | Status: SHIPPED | OUTPATIENT
Start: 2025-08-11 | End: 2026-02-07

## 2025-08-27 ENCOUNTER — OFFICE VISIT (OUTPATIENT)
Dept: OBSTETRICS AND GYNECOLOGY | Facility: CLINIC | Age: 65
End: 2025-08-27
Payer: MEDICARE

## 2025-08-27 VITALS
HEIGHT: 63 IN | BODY MASS INDEX: 20.9 KG/M2 | DIASTOLIC BLOOD PRESSURE: 70 MMHG | WEIGHT: 117.94 LBS | SYSTOLIC BLOOD PRESSURE: 97 MMHG | HEART RATE: 85 BPM

## 2025-08-27 DIAGNOSIS — Z12.4 ENCOUNTER FOR SCREENING FOR MALIGNANT NEOPLASM OF CERVIX: ICD-10-CM

## 2025-08-27 DIAGNOSIS — Z12.39 ENCOUNTER FOR SCREENING BREAST EXAMINATION: ICD-10-CM

## 2025-08-27 DIAGNOSIS — Z01.411 ENCNTR FOR GYN EXAM (GENERAL) (ROUTINE) W ABNORMAL FINDINGS: Primary | ICD-10-CM

## 2025-08-27 DIAGNOSIS — Z12.11 ENCOUNTER FOR SCREENING FOR MALIGNANT NEOPLASM OF COLON: ICD-10-CM

## 2025-08-27 DIAGNOSIS — N95.2 VAGINAL ATROPHY: ICD-10-CM

## 2025-08-27 PROCEDURE — 87624 HPV HI-RISK TYP POOLED RSLT: CPT | Performed by: NURSE PRACTITIONER

## 2025-08-27 PROCEDURE — 99999 PR PBB SHADOW E&M-EST. PATIENT-LVL III: CPT | Mod: PBBFAC,,, | Performed by: NURSE PRACTITIONER

## (undated) DEVICE — BLADE SURG CARBON STEEL SZ11

## (undated) DEVICE — TRAY FOLEY 16FR INFECTION CONT

## (undated) DEVICE — DRAPE STERI INSTRUMENT 1018

## (undated) DEVICE — SOL NS 1000CC

## (undated) DEVICE — SUT MCRYL PLUS 4-0 PS2 27IN

## (undated) DEVICE — SUT PDS II 1 TP-1 VIL

## (undated) DEVICE — IRRIGATOR ENDOSCOPY DISP.

## (undated) DEVICE — DRAPE ABDOMINAL TIBURON 14X11

## (undated) DEVICE — TRAY MINOR GEN SURG

## (undated) DEVICE — STAPLER INT PROX TX 30X3.5MM

## (undated) DEVICE — SEE MEDLINE ITEM 152622

## (undated) DEVICE — NDL HYPO REG 25G X 1 1/2

## (undated) DEVICE — SUT 0 VICRYL / UR6 (J603)

## (undated) DEVICE — WARMER DRAPE STERILE LF

## (undated) DEVICE — ELECTRODE REM PLYHSV RETURN 9

## (undated) DEVICE — TROCAR ENDOPATH EXCEL

## (undated) DEVICE — KIT ANTIFOG

## (undated) DEVICE — DRESSING ADHESIVE ISLAND 3 X 6

## (undated) DEVICE — TUBING HF INSUFFLATION W/ FLTR